# Patient Record
Sex: FEMALE | Race: WHITE | Employment: OTHER | URBAN - NONMETROPOLITAN AREA
[De-identification: names, ages, dates, MRNs, and addresses within clinical notes are randomized per-mention and may not be internally consistent; named-entity substitution may affect disease eponyms.]

---

## 2017-02-17 ENCOUNTER — TRANSFERRED RECORDS (OUTPATIENT)
Dept: HEALTH INFORMATION MANAGEMENT | Facility: HOSPITAL | Age: 82
End: 2017-02-17

## 2017-02-28 ENCOUNTER — OFFICE VISIT (OUTPATIENT)
Dept: FAMILY MEDICINE | Facility: OTHER | Age: 82
End: 2017-02-28
Attending: FAMILY MEDICINE
Payer: COMMERCIAL

## 2017-02-28 ENCOUNTER — OFFICE VISIT (OUTPATIENT)
Dept: SLEEP MEDICINE | Facility: HOSPITAL | Age: 82
End: 2017-02-28
Attending: INTERNAL MEDICINE
Payer: COMMERCIAL

## 2017-02-28 VITALS
SYSTOLIC BLOOD PRESSURE: 124 MMHG | BODY MASS INDEX: 23.43 KG/M2 | DIASTOLIC BLOOD PRESSURE: 60 MMHG | RESPIRATION RATE: 20 BRPM | WEIGHT: 124 LBS | HEART RATE: 68 BPM | OXYGEN SATURATION: 96 %

## 2017-02-28 VITALS — OXYGEN SATURATION: 98 % | SYSTOLIC BLOOD PRESSURE: 132 MMHG | HEART RATE: 60 BPM | DIASTOLIC BLOOD PRESSURE: 64 MMHG

## 2017-02-28 DIAGNOSIS — I25.10 CORONARY ARTERIOSCLEROSIS IN NATIVE ARTERY: ICD-10-CM

## 2017-02-28 DIAGNOSIS — I49.5 SICK SINUS SYNDROME (H): ICD-10-CM

## 2017-02-28 DIAGNOSIS — R06.02 SHORTNESS OF BREATH: Primary | ICD-10-CM

## 2017-02-28 DIAGNOSIS — J84.10 PULMONARY FIBROSIS (H): ICD-10-CM

## 2017-02-28 DIAGNOSIS — I10 BENIGN ESSENTIAL HYPERTENSION: Primary | ICD-10-CM

## 2017-02-28 PROCEDURE — 99212 OFFICE O/P EST SF 10 MIN: CPT

## 2017-02-28 PROCEDURE — 99213 OFFICE O/P EST LOW 20 MIN: CPT | Performed by: FAMILY MEDICINE

## 2017-02-28 PROCEDURE — 99212 OFFICE O/P EST SF 10 MIN: CPT | Mod: 27

## 2017-02-28 PROCEDURE — 99211 OFF/OP EST MAY X REQ PHY/QHP: CPT | Performed by: INTERNAL MEDICINE

## 2017-02-28 NOTE — PROGRESS NOTES
Cut to 5 mg prednisone, not doing much for her. Has spells of 'weakness', seems to emanate from her upper abdomen, ?. We discussed her diastolic dysfunction and mild obstructive lung disease and how limited our treatment is. Will try to incr prednisone to 10 mg, we discussed potential complications of this drug, she'll call 2 weeks, back in 2 mo.  /64 (BP Location: Right arm, Cuff Size: Adult Regular)  Pulse 60  SpO2 98%  Resp clear  Cor rrr

## 2017-02-28 NOTE — MR AVS SNAPSHOT
"              After Visit Summary   2/28/2017    Lauren Barron    MRN: [id number]           Patient Information     Date Of Birth          1/17/1926        Visit Information        Provider Department      2/28/2017 8:30 AM Humphrey Worley MD [facility]        Today's Diagnoses     Shortness of breath    -  1       Follow-ups after your visit        Your next 10 appointments already scheduled     Feb 28, 2017  9:30 AM CST   (Arrive by 9:15 AM)   SHORT with Vandana Jones MD   [facility])    [address] [place] [address]   [phone]            May 31, 2017 10:30 AM CDT   Return Visit with Humphrey Worley MD   [facility] ([facility] )    [address] [address]   [phone]            Jun 06, 2017  9:45 AM CDT   (Arrive by 9:30 AM)   SHORT with Vandana Jones MD   [facility])    [address] [place] [address]   [phone]              Who to contact     If you have questions or need follow up information about today's clinic visit or your schedule please contact [facility] directly at [phone].  Normal or non-critical lab and imaging results will be communicated to you by MyChart, letter or phone within 4 business days after the clinic has received the results. If you do not hear from us within 7 days, please contact the clinic through MyChart or phone. If you have a critical or abnormal lab result, we will notify you by phone as soon as possible.  Submit refill requests through Nextinit or call your pharmacy and they will forward the refill request to us. Please allow 3 business days for your refill to be completed.          Additional Information About Your Visit        MyChart Information     Nextinit lets you send messages to your doctor, view your test results, renew your prescriptions, schedule appointments and more. To sign up, go to [email or web address] . Click on \"Log in\" on " "the left side of the screen, which will take you to the Welcome page. Then click on \"Sign up Now\" on the right side of the page.     You will be asked to enter the access code listed below, as well as some personal information. Please follow the directions to create your username and password.     Your access code is: [email or web address]  Expires: 2017  8:47 AM     Your access code will  in 90 days. If you need help or a new code, please call your [facility] or [phone].        Care EveryWhere ID     This is your Care EveryWhere ID. This could be used by other organizations to access your [place] medical records  [id number]        Your Vitals Were     Pulse Pulse Oximetry                60 98%           Blood Pressure from Last 3 Encounters:   17 132/64   16 122/60   16 110/52    Weight from Last 3 Encounters:   16 125 lb (56.7 kg)   16 125 lb (56.7 kg)   10/06/16 127 lb (57.6 kg)              Today, you had the following     No orders found for display         Today's Medication Changes          These changes are accurate as of: 17  8:47 AM.  If you have any questions, ask your nurse or doctor.               These medicines have changed or have updated prescriptions.        Dose/Directions    predniSONE 10 MG tablet   Commonly known as:  DELTASONE   This may have changed:  how much to take   Used for:  Other emphysema (H)        Dose:  10 mg   Take 1 tablet (10 mg) by mouth daily (with breakfast)   Quantity:  60 tablet   Refills:  1                Primary Care Provider Office Phone # Fax #    Vandana Jones [phone]       [facility] [place] [address]  [facility] [address]        Thank you!     Thank you for choosing [facility]  for your care. Our goal is always to provide you with excellent care. Hearing back from our patients is one way we can continue to improve our services. Please take a few minutes to complete the written survey " that you may receive in the mail after your visit with us. Thank you!             Your Updated Medication List - Protect others around you: Learn how to safely use, store and throw away your medicines at www.disposemymeds.org.          This list is accurate as of: 2/28/17  8:47 AM.  Always use your most recent med list.                   Brand Name Dispense Instructions for use    acetaminophen 500 MG tablet    TYLENOL    100 tablet    Take 2 tablet (1000mg) by oral route every 6 hours as needed       amLODIPine 5 MG tablet    NORVASC    90 tablet    TAKE (1) TABLET DAILY.       benazepril 40 MG tablet    LOTENSIN    180 tablet    TAKE  (1)  TABLET TWICE A DAY.       calcium 1500 MG Tabs     100 tablet    Take 1 tablet by mouth daily       isosorbide mononitrate 30 MG 24 hr tablet    IMDUR    90 tablet    TAKE ONE TABLET AT BEDTIME.       Multiple vitamin Tabs     900 tablet    Take 1 tablet by oral route every day with foodTake 1 tablet by oral route every day with food       nitroglycerin 0.4 MG sublingual tablet    NITROSTAT    25 tablet    Place 1 tablet (0.4mg) by sublingual route at the first sign of attack; may repeat ever 5 min until relief; if pain persists after 3 tablets in 15 minutes prompt medical attention is recommended. AS NEEDED       pantoprazole 40 MG EC tablet    PROTONIX    60 tablet    Take 1 tablet (40 mg) by mouth daily       predniSONE 10 MG tablet    DELTASONE    60 tablet    Take 1 tablet (10 mg) by mouth daily (with breakfast)       STATIN NOT PRESCRIBED (INTENTIONAL)     0 each    Statin not prescribed intentionally due to Other patient declines (This option does not exclude patient from measure)       triamterene-hydrochlorothiazide 37.5-25 MG per capsule    DYAZIDE    90 capsule    TAKE 1 CAPSULE DAILY.       vitamin D 1000 UNITS capsule     90 capsule    Take 1 capsule by mouth daily

## 2017-02-28 NOTE — MR AVS SNAPSHOT
"              After Visit Summary   2/28/2017    Laurne Barron    MRN: [id number]           Patient Information     Date Of Birth          1/17/1926        Visit Information        Provider Department      2/28/2017 9:30 AM Vandana Jones MD [facility]        Today's Diagnoses     Benign essential hypertension    -  1    Sick sinus syndrome (H)        Pulmonary fibrosis (H)           Follow-ups after your visit        Your next 10 appointments already scheduled     May 31, 2017 10:30 AM CDT   Return Visit with Humphrey Worley MD   [facility] ([facility] )    [address] [address]   [phone]            Jun 06, 2017  9:45 AM CDT   (Arrive by 9:30 AM)   SHORT with Vandana Jones MD   [facility] ([facility])    [address] [place] [address]   [phone]              Who to contact     If you have questions or need follow up information about today's clinic visit or your schedule please contact [facility] directly at [phone].  Normal or non-critical lab and imaging results will be communicated to you by MyChart, letter or phone within 4 business days after the clinic has received the results. If you do not hear from us within 7 days, please contact the clinic through [employer] or phone. If you have a critical or abnormal lab result, we will notify you by phone as soon as possible.  Submit refill requests through Rev[employer]uate or call your pharmacy and they will forward the refill request to us. Please allow 3 business days for your refill to be completed.          Additional Information About Your Visit        [employer] Information     Rev[employer]uate lets you send messages to your doctor, view your test results, renew your prescriptions, schedule appointments and more. To sign up, go to [email or web address] . Click on \"Log in\" on the left side of the screen, which will take you to the Welcome page. Then click on \"Sign up Now\" on " the right side of the page.     You will be asked to enter the access code listed below, as well as some personal information. Please follow the directions to create your username and password.     Your access code is: [email or web address]  Expires: 2017  8:47 AM     Your access code will  in 90 days. If you need help or a new code, please call your [facility] or [phone].        Care EveryWhere ID     This is your Care EveryWhere ID. This could be used by other organizations to access your [place] medical records  [id number]        Your Vitals Were     Pulse Respirations Pulse Oximetry BMI (Body Mass Index)          68 20 96% 23.43 kg/m2         Blood Pressure from Last 3 Encounters:   17 124/60   17 132/64   16 122/60    Weight from Last 3 Encounters:   17 124 lb (56.2 kg)   16 125 lb (56.7 kg)   16 125 lb (56.7 kg)              Today, you had the following     No orders found for display         Today's Medication Changes          These changes are accurate as of: 17 10:00 AM.  If you have any questions, ask your nurse or doctor.               These medicines have changed or have updated prescriptions.        Dose/Directions    predniSONE 10 MG tablet   Commonly known as:  DELTASONE   This may have changed:  how much to take   Used for:  Other emphysema (H)        Dose:  10 mg   Take 1 tablet (10 mg) by mouth daily (with breakfast)   Quantity:  60 tablet   Refills:  1                Primary Care Provider Office Phone # Fax #    Vandana Jones [phone]       [facility] [place] [address]  [facility] [address]        Thank you!     Thank you for choosing [facility]  for your care. Our goal is always to provide you with excellent care. Hearing back from our patients is one way we can continue to improve our services. Please take a few minutes to complete the written survey that you may receive in the mail after your visit  with us. Thank you!             Your Updated Medication List - Protect others around you: Learn how to safely use, store and throw away your medicines at www.disposemymeds.org.          This list is accurate as of: 2/28/17 10:00 AM.  Always use your most recent med list.                   Brand Name Dispense Instructions for use    acetaminophen 500 MG tablet    TYLENOL    100 tablet    Take 2 tablet (1000mg) by oral route every 6 hours as needed       amLODIPine 5 MG tablet    NORVASC    90 tablet    TAKE (1) TABLET DAILY.       benazepril 40 MG tablet    LOTENSIN    180 tablet    TAKE  (1)  TABLET TWICE A DAY.       calcium 1500 MG Tabs     100 tablet    Take 1 tablet by mouth daily       isosorbide mononitrate 30 MG 24 hr tablet    IMDUR    90 tablet    TAKE ONE TABLET AT BEDTIME.       Multiple vitamin Tabs     900 tablet    Take 1 tablet by oral route every day with foodTake 1 tablet by oral route every day with food       nitroglycerin 0.4 MG sublingual tablet    NITROSTAT    25 tablet    Place 1 tablet (0.4mg) by sublingual route at the first sign of attack; may repeat ever 5 min until relief; if pain persists after 3 tablets in 15 minutes prompt medical attention is recommended. AS NEEDED       pantoprazole 40 MG EC tablet    PROTONIX    60 tablet    Take 1 tablet (40 mg) by mouth daily       predniSONE 10 MG tablet    DELTASONE    60 tablet    Take 1 tablet (10 mg) by mouth daily (with breakfast)       STATIN NOT PRESCRIBED (INTENTIONAL)     0 each    Statin not prescribed intentionally due to Other patient declines (This option does not exclude patient from measure)       triamterene-hydrochlorothiazide 37.5-25 MG per capsule    DYAZIDE    90 capsule    TAKE 1 CAPSULE DAILY.       vitamin D 1000 UNITS capsule     90 capsule    Take 1 capsule by mouth daily

## 2017-02-28 NOTE — NURSING NOTE
Patient ID was checked. Medications and vitals were reviewed. Vitals and a brief history was taken.

## 2017-03-01 NOTE — PROGRESS NOTES
[facility]    Lauren Barron, 89 year old, female presents with   Chief Complaint   Patient presents with     Follow Up For     coronary artery disease and hypertension. She is accompanied by her daughter Raeann today. She had seen Dr Worley earlier today who is increasing her prednisone to 10 mg daily for 2 weeks. She becomes quite short of breath with pushing her walking and will get a feeling of weakness starting below the diaphragm and radiating to her legs which has been chronic. As she slows down and rests this improves. This was discussed with Dr Worley as well today. She has aortic sclerosis without aortic stenosis and a heavily calcified splenic artery not requiring surgery per Dr Connor. She hasn't wanted to investigate her aortic valve further as we have discussed in the past that surgery would be a big process and she doesn't want to proceed. 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     Aortic insufficiency 6/2/2015     moderate, aortic sclerosis without stenosis echo 5/6/2015      ASCVD (arteriosclerotic cardiovascular disease) 08/25/2009     Bare metal stent obtuse margnial     Atherosclerosis of aorta (H) 03/12/2012     ECHO 2/2012     Cystocele 03/22/2012     Diastolic dysfunction 03/12/2012     GI bleed 07/25/2011     EGD-H Pylori, treated, Colonoscopy small Polyp - transfused 3 units of blood.     Hyperlipidemia 12/02/2003     Hypertension 12/06/1999     Mammogram declined 12/20/2012     Meniere's disease, unspecified 07/25/2011     Mitral regurgitation 6/2/2015     moderate, echo 5/6/2015      Osteoarthritis 11/06/2000     Sick sinus syndrome (H) 03/28/2016     dual chamber  pacemaker placement     Squamous cell skin cancer, chiquita coelloi 2006     MOHS Surgery     Transient global amnesia 2004       PAST SURGICAL HISTORY:  Past Surgical History   Procedure Laterality Date     Left subclavian line, triple lumen       Gastric Ulcer, Dieulfoy lesion, hemostatic clips placed - Massive GI Bleed - Transferred to [facility] removal of ovarian cyst(s)       C total knee arthroplasty  2007     LEFT - Osteoarthritis - [facility] Ru[facility] total knee arthroplasty  2009     RIGHT - Osteoarthritis - [facility] Ruvalc[facility]     Arthroscopy knee       RIGHT - Osteoarthritis     Hysterectomy, hui       Metorrhagia     Colonoscopy with polypectomy  2012     GI BLEED - DR. DEL CASTILLO            Cataract extraction and lens implantation       BILATERAL     Release carpal tunnel       RIGHT - Carpal Tunnel Syndrome     Repair bladder       Appendectomy       Colonoscopy       Implant pacemaker  2016       SOCIAL HISTORY  Social History     Social History     Marital status:      Spouse name: N/A     Number of children: N/A     Years of education: N/A     Occupational History     Not on file.     Social History Main Topics     Smoking status: Never Smoker     Smokeless tobacco: Never Used     Alcohol use No     Drug use: No     Sexual activity: No      Comment:      Other Topics Concern      Service No     Blood Transfusions Yes     Permits if needed     Caffeine Concern Yes     Coffee - 2 cups     Seat Belt Yes     Self-Exams Yes     Parent/Sibling W/ Cabg, Mi Or Angioplasty Before 65f 55m? No     Social History Narrative       MEDICATIONS:  Prior to Admission medications    Medication Sig Start Date End Date Taking? Authorizing Provider   amLODIPine (NORVASC) 5 MG tablet TAKE (1) TABLET DAILY. 16  Yes Vandana Jones MD   benazepril (LOTENSIN) 40 MG tablet TAKE  (1)  TABLET TWICE A DAY. 16  Yes Vandana Jones MD   isosorbide mononitrate  (IMDUR) 30 MG 24 hr tablet TAKE ONE TABLET AT BEDTIME. 12/28/16  Yes Vandana Jones MD   pantoprazole (PROTONIX) 40 MG EC tablet Take 1 tablet (40 mg) by mouth daily 12/28/16  Yes Vandana Jones MD   triamterene-hydrochlorothiazide (DYAZIDE) 37.5-25 MG per capsule TAKE 1 CAPSULE DAILY. 11/8/16  Yes Vandana Jones MD   predniSONE (DELTASONE) 10 MG tablet Take 1 tablet (10 mg) by mouth daily (with breakfast)  Patient taking differently: Take 5 mg by mouth daily (with breakfast)  11/8/16  Yes Humphrey Worley MD   STATIN NOT PRESCRIBED, INTENTIONAL, Statin not prescribed intentionally due to Other patient declines (This option does not exclude patient from measure) 9/8/16  Yes Vandana Jones MD   nitroglycerin (NITROSTAT) 0.4 MG SL tablet Place 1 tablet (0.4mg) by sublingual route at the first sign of attack; may repeat ever 5 min until relief; if pain persists after 3 tablets in 15 minutes prompt medical attention is recommended. AS NEEDED 12/2/14  Yes Vandana Jones MD   calcium 1500 MG TABS Take 1 tablet by mouth daily 12/24/13  Yes Vandana Jones MD   acetaminophen (TYLENOL) 500 MG tablet Take 2 tablet (1000mg) by oral route every 6 hours as needed 12/23/13  Yes Vandana Jones MD   Cholecalciferol (VITAMIN D) 1000 UNITS capsule Take 1 capsule by mouth daily 12/23/13  Yes Vandana Jones MD   Multiple vitamin TABS Take 1 tablet by oral route every day with foodTake 1 tablet by oral route every day with food 12/23/13  Yes Vandana Jones MD       ALLERGIES:     Allergies   Allergen Reactions     Foard Juice Other (See Comments), Nausea and GI Disturbance     Vertigo     Naprosyn [Naproxen]      Incontinence         ROS:  C: NEGATIVE for fever, chills, change in weight  R: NEGATIVE for significant cough or SOB  CV: NEGATIVE for chest pain, palpitations or peripheral edema  GI: NEGATIVE for nausea, abdominal pain, heartburn, or change in bowel habits  M: NEGATIVE for significant arthralgias or myalgia  N:  NEGATIVE for weakness, dizziness or paresthesias  H: NEGATIVE for bleeding problems  P: NEGATIVE for changes in mood or affect    EXAM:  /60  Pulse 68  Resp 20  Wt 124 lb (56.2 kg)  SpO2 96%  BMI 23.43 kg/m2 Body mass index is 23.43 kg/(m^2).   GENERAL APPEARANCE: healthy, alert and no distress  NECK: no adenopathy, no asymmetry, masses, or scars and thyroid normal to palpation  RESP: lungs clear to auscultation - no rales, rhonchi or wheezes  CV: regular rates and rhythm, normal S1 S2, no S3 or S4 and no murmur, click or rub  ABD: soft nontender, nondistended, without HSM or bruits noted  MS: extremities normal- no gross deformities noted  NEURO: Normal strength and tone, mentation intact and speech normal  PSYCH: mentation appears normal and affect normal/bright    LABS AND IMAGING:     Results for orders placed or performed in visit on 12/05/16   CBC with platelets and differential   Result Value Ref Range    WBC 7.8 4.0 - 11.0 10e9/L    RBC Count 3.89 3.8 - 5.2 10e12/L    Hemoglobin 11.5 (L) 11.7 - 15.7 g/dL    Hematocrit 33.2 (L) 35.0 - 47.0 %    MCV 85 78 - 100 fl    MCH 29.6 26.5 - 33.0 pg    MCHC 34.6 31.5 - 36.5 g/dL    RDW 13.4 10.0 - 15.0 %    Platelet Count 253 150 - 450 10e9/L    Diff Method Automated Method     % Neutrophils 63.3 %    % Lymphocytes 22.2 %    % Monocytes 12.5 %    % Eosinophils 1.3 %    % Basophils 0.3 %    % Immature Granulocytes 0.4 %    Nucleated RBCs 0 0 /100    Absolute Neutrophil 4.9 1.6 - 8.3 10e9/L    Absolute Lymphocytes 1.7 0.8 - 5.3 10e9/L    Absolute Monocytes 1.0 0.0 - 1.3 10e9/L    Absolute Eosinophils 0.1 0.0 - 0.7 10e9/L    Absolute Basophils 0.0 0.0 - 0.2 10e9/L    Abs Immature Granulocytes 0.0 0 - 0.4 10e9/L    Absolute Nucleated RBC 0.0    Lipid Profile   Result Value Ref Range    Cholesterol 227 (H) <200 mg/dL    Triglycerides 271 (H) <150 mg/dL    HDL Cholesterol 54 >49 mg/dL    LDL Cholesterol Calculated 119 (H) <100 mg/dL    Non HDL Cholesterol 173 (H) <130  mg/dL   Comprehensive metabolic panel   Result Value Ref Range    Sodium 131 (L) 133 - 144 mmol/L    Potassium 4.6 3.4 - 5.3 mmol/L    Chloride 98 94 - 109 mmol/L    Carbon Dioxide 24 20 - 32 mmol/L    Anion Gap 9 3 - 14 mmol/L    Glucose 99 70 - 99 mg/dL    Urea Nitrogen 48 (H) 7 - 30 mg/dL    Creatinine 1.44 (H) 0.52 - 1.04 mg/dL    GFR Estimate 34 (L) >60 mL/min/1.7m2    GFR Estimate If Black 41 (L) >60 mL/min/1.7m2    Calcium 9.1 8.5 - 10.1 mg/dL    Bilirubin Total 0.4 0.2 - 1.3 mg/dL    Albumin 3.5 3.4 - 5.0 g/dL    Protein Total 7.3 6.8 - 8.8 g/dL    Alkaline Phosphatase 52 40 - 150 U/L    ALT 27 0 - 50 U/L    AST 16 0 - 45 U/L         ASSESSMENT/PLAN:  (I10) Benign essential hypertension  (primary encounter diagnosis)  Comment: controlled  Plan: continue current medications. She has life line at home as well    (I49.5) Sick sinus syndrome (H)  Comment:   Plan: s/p pace maker placement, working well for her    (J84.10) Pulmonary fibrosis (H)  Comment:   Plan: on increased doses of prednisone per Dr Worley at this time    (I25.10) Coronary arteriosclerosis in native artery  Comment:   Plan: asymptomatic, doing well.       Vandana Jones MD  March 1, 2017

## 2017-03-16 ENCOUNTER — DOCUMENTATION ONLY (OUTPATIENT)
Dept: SLEEP MEDICINE | Facility: HOSPITAL | Age: 82
End: 2017-03-16

## 2017-03-16 NOTE — PROGRESS NOTES
Prednisone at 10 mg seems to be helping her breathing, she thinks it may be causing 'hot flashes' as well. She will continue at between 5 and 10 mg and see me in a month.

## 2017-04-05 DIAGNOSIS — I25.9 CHRONIC ISCHEMIC HEART DISEASE, UNSPECIFIED: ICD-10-CM

## 2017-04-05 DIAGNOSIS — J43.9 EMPHYSEMA, UNSPECIFIED (H): Primary | ICD-10-CM

## 2017-04-05 DIAGNOSIS — J43.8 OTHER EMPHYSEMA (H): ICD-10-CM

## 2017-04-06 ENCOUNTER — TELEPHONE (OUTPATIENT)
Dept: FAMILY MEDICINE | Facility: OTHER | Age: 82
End: 2017-04-06

## 2017-04-06 DIAGNOSIS — F41.9 ANXIETY: Primary | ICD-10-CM

## 2017-04-06 RX ORDER — PREDNISONE 10 MG/1
TABLET ORAL
Qty: 60 TABLET | Refills: 0 | Status: SHIPPED | OUTPATIENT
Start: 2017-04-06 | End: 2017-06-06

## 2017-04-06 RX ORDER — LORAZEPAM 0.5 MG/1
0.5 TABLET ORAL EVERY 8 HOURS PRN
Qty: 20 TABLET | Refills: 0 | Status: SHIPPED | OUTPATIENT
Start: 2017-04-06 | End: 2017-05-16

## 2017-04-06 RX ORDER — ISOSORBIDE MONONITRATE 30 MG/1
TABLET, EXTENDED RELEASE ORAL
Qty: 90 TABLET | Refills: 0 | Status: SHIPPED | OUTPATIENT
Start: 2017-04-06 | End: 2017-07-06

## 2017-04-06 NOTE — TELEPHONE ENCOUNTER
Prednisone last filled on 11.8.16,# 60 with 1 refill.Last office visit on 2.28.17.Medication pended.Thank you.

## 2017-04-06 NOTE — TELEPHONE ENCOUNTER
Notified daughter of this new medication. Will need to be signed and then we will fax to [facility].

## 2017-04-06 NOTE — TELEPHONE ENCOUNTER
This is fine, will use Lorazepam 0.5 every 8 hours as needed, sent to [facility]

## 2017-04-06 NOTE — TELEPHONE ENCOUNTER
11:12 AM    Reason for Call: Phone Call    Description: Patient's daughter, Yesica, would like to know if patient could have a low dose of Lorazepam or something for anxiety? Patient is quite anxious in the morning. If you could call Yesica back at [phone]    Was an appointment offered for this call? No    Preferred method for responding to this message: Telephone Call    If we cannot reach you directly, may we leave a detailed response at the number you provided? Yes    Can this message wait until your PCP/provider returns, if available today? YES    Daily Carrion

## 2017-05-16 DIAGNOSIS — F41.9 ANXIETY: ICD-10-CM

## 2017-05-16 RX ORDER — LORAZEPAM 0.5 MG/1
0.5 TABLET ORAL EVERY 8 HOURS PRN
Qty: 20 TABLET | Refills: 0 | Status: SHIPPED | OUTPATIENT
Start: 2017-05-16 | End: 2017-06-26

## 2017-05-16 NOTE — TELEPHONE ENCOUNTER
lorazepam      Last Written Prescription Date: 4/6/17  Last Fill Quantity: 20,  # refills: 0   Last Office Visit with G, [facility] or [facility] prescribing provider: 2/28/17                                         Next 5 appointments (look out 90 days)     May 31, 2017 10:30 AM CDT   Return Visit with Humphrey Worley MD   [facility] ([facility] )    [address] [place] [address]   [phone]            Jun 06, 2017  9:45 AM CDT   (Arrive by 9:30 AM)   SHORT with Vandana Jones MD   [facility] ([facility])    [address] [place] [address]   [phone]

## 2017-05-18 DIAGNOSIS — I10 BENIGN ESSENTIAL HYPERTENSION: ICD-10-CM

## 2017-05-19 RX ORDER — TRIAMTERENE AND HYDROCHLOROTHIAZIDE 37.5; 25 MG/1; MG/1
CAPSULE ORAL
Qty: 90 CAPSULE | Refills: 1 | Status: SHIPPED | OUTPATIENT
Start: 2017-05-19 | End: 2017-08-28

## 2017-05-19 NOTE — TELEPHONE ENCOUNTER
Dyazide      Last Written Prescription Date: 02/14/17  Last Fill Quantity: 90, # refills: 0  Last Office Visit with G, [facility] or [facility] prescribing provider: 02/28/17  Next 5 appointments (look out 90 days)     May 31, 2017 10:30 AM CDT   Return Visit with Humphrey Worley MD   [facility] ([facility] )    [address] [place] [address]   [phone]            Jun 06, 2017  9:45 AM CDT   (Arrive by 9:30 AM)   SHORT with Vandana Jones MD   [facility] ([facility])    [address] [place] [address]   [phone]                   Potassium   Date Value Ref Range Status   12/05/2016 4.6 3.4 - 5.3 mmol/L Final     Creatinine   Date Value Ref Range Status   12/05/2016 1.44 (H) 0.52 - 1.04 mg/dL Final     BP Readings from Last 3 Encounters:   02/28/17 124/60   02/28/17 132/64   12/05/16 122/60

## 2017-05-31 ENCOUNTER — OFFICE VISIT (OUTPATIENT)
Dept: SLEEP MEDICINE | Facility: HOSPITAL | Age: 82
End: 2017-05-31
Attending: INTERNAL MEDICINE
Payer: COMMERCIAL

## 2017-05-31 VITALS
SYSTOLIC BLOOD PRESSURE: 142 MMHG | HEART RATE: 65 BPM | RESPIRATION RATE: 16 BRPM | OXYGEN SATURATION: 96 % | DIASTOLIC BLOOD PRESSURE: 56 MMHG

## 2017-05-31 DIAGNOSIS — R06.02 SHORTNESS OF BREATH: Primary | ICD-10-CM

## 2017-05-31 PROCEDURE — 99212 OFFICE O/P EST SF 10 MIN: CPT | Performed by: INTERNAL MEDICINE

## 2017-05-31 NOTE — PROGRESS NOTES
Dyspnea is baseline, some day to day variability, doesn't feel prednisone works well so will taper off it. We talked about exercise and she will work on this.   /56  Pulse 65  Resp 16  SpO2 96%    A/ Dyspnea multifactorial, mild pulm/cardiac/dx and deconditioning.

## 2017-05-31 NOTE — MR AVS SNAPSHOT
"              After Visit Summary   5/31/2017    Lauren Barron    MRN: [id number]           Patient Information     Date Of Birth          1/17/1926        Visit Information        Provider Department      5/31/2017 10:30 AM Humphrey Worley MD [facility]        Today's Diagnoses     Shortness of breath    -  1       Follow-ups after your visit        Your next 10 appointments already scheduled     May 31, 2017 10:30 AM CDT   Return Visit with Humphrey Worley MD   [facility] ([facility] )    [address] [address]   [phone]            Jun 06, 2017  9:45 AM CDT   (Arrive by 9:30 AM)   SHORT with Vandana Jones MD   [facility] ([facility])    [address] [place] [address]   [phone]              Who to contact     If you have questions or need follow up information about today's clinic visit or your schedule please contact [facility] directly at [phone].  Normal or non-critical lab and imaging results will be communicated to you by [employer], letter or phone within 4 business days after the clinic has received the results. If you do not hear from us within 7 days, please contact the clinic through [facility] [employer] or phone. If you have a critical or abnormal lab result, we will notify you by phone as soon as possible.  Submit refill requests through [employer] or call your pharmacy and they will forward the refill request to us. Please allow 3 business days for your refill to be completed.          Additional Information About Your Visit        [employer] Information     [employer] lets you send messages to your doctor, view your test results, renew your prescriptions, schedule appointments and more. To sign up, go to [email or web address] . Click on \"Log in\" on the left side of the screen, which will take you to the Welcome page. Then click on \"Sign up Now\" on the right side of the page.     You will be asked to enter the access code listed below, as " well as some personal information. Please follow the directions to create your username and password.     Your access code is: [email or web address]  Expires: 2017 10:26 AM     Your access code will  in 90 days. If you need help or a new code, please call your [facility] clinic or [phone].        Care EveryWhere ID     This is your Care EveryWhere ID. This could be used by other organizations to access your [facility] medical records  [id number]        Your Vitals Were     Pulse Respirations Pulse Oximetry             65 16 96%          Blood Pressure from Last 3 Encounters:   17 142/56   17 124/60   17 132/64    Weight from Last 3 Encounters:   17 124 lb (56.2 kg)   16 125 lb (56.7 kg)   16 125 lb (56.7 kg)              Today, you had the following     No orders found for display       Primary Care Provider Office Phone # Fax #    Vandana Jones [phone]       [facility] [place] [address] [facility] [address]  [place] [address]        Thank you!     Thank you for choosing [facility]  for your care. Our goal is always to provide you with excellent care. Hearing back from our patients is one way we can continue to improve our services. Please take a few minutes to complete the written survey that you may receive in the mail after your visit with us. Thank you!             Your Updated Medication List - Protect others around you: Learn how to safely use, store and throw away your medicines at www.disposemymeds.org.          This list is accurate as of: 17 10:26 AM.  Always use your most recent med list.                   Brand Name Dispense Instructions for use    acetaminophen 500 MG tablet    TYLENOL    100 tablet    Take 2 tablet (1000mg) by oral route every 6 hours as needed       amLODIPine 5 MG tablet    NORVASC    90 tablet    TAKE (1) TABLET DAILY.       benazepril 40 MG tablet    LOTENSIN    180 tablet    TAKE  (1)  TABLET TWICE A DAY.       calcium  1500 MG Tabs     100 tablet    Take 1 tablet by mouth daily       isosorbide mononitrate 30 MG 24 hr tablet    IMDUR    90 tablet    TAKE ONE TABLET AT BEDTIME.       LORazepam 0.5 MG tablet    ATIVAN    20 tablet    Take 1 tablet (0.5 mg) by mouth every 8 hours as needed for anxiety       Multiple vitamin Tabs     900 tablet    Take 1 tablet by oral route every day with foodTake 1 tablet by oral route every day with food       nitroglycerin 0.4 MG sublingual tablet    NITROSTAT    25 tablet    Place 1 tablet (0.4mg) by sublingual route at the first sign of attack; may repeat ever 5 min until relief; if pain persists after 3 tablets in 15 minutes prompt medical attention is recommended. AS NEEDED       pantoprazole 40 MG EC tablet    PROTONIX    60 tablet    Take 1 tablet (40 mg) by mouth daily       predniSONE 10 MG tablet    DELTASONE    60 tablet    TAKE 1 TABLET DAILY WITH BREAKFAST.       STATIN NOT PRESCRIBED (INTENTIONAL)     0 each    Statin not prescribed intentionally due to Other patient declines (This option does not exclude patient from measure)       triamterene-hydrochlorothiazide 37.5-25 MG per capsule    DYAZIDE    90 capsule    TAKE 1 CAPSULE DAILY.       vitamin D 1000 UNITS capsule     90 capsule    Take 1 capsule by mouth daily

## 2017-06-06 ENCOUNTER — OFFICE VISIT (OUTPATIENT)
Dept: FAMILY MEDICINE | Facility: OTHER | Age: 82
End: 2017-06-06
Attending: FAMILY MEDICINE
Payer: COMMERCIAL

## 2017-06-06 VITALS
OXYGEN SATURATION: 98 % | SYSTOLIC BLOOD PRESSURE: 118 MMHG | WEIGHT: 128 LBS | BODY MASS INDEX: 24.19 KG/M2 | DIASTOLIC BLOOD PRESSURE: 52 MMHG | TEMPERATURE: 96.7 F | HEART RATE: 60 BPM | RESPIRATION RATE: 18 BRPM

## 2017-06-06 DIAGNOSIS — I25.9 CHRONIC ISCHEMIC HEART DISEASE, UNSPECIFIED: ICD-10-CM

## 2017-06-06 DIAGNOSIS — R10.13 ABDOMINAL PAIN, EPIGASTRIC: ICD-10-CM

## 2017-06-06 DIAGNOSIS — I10 BENIGN ESSENTIAL HYPERTENSION: Primary | ICD-10-CM

## 2017-06-06 LAB
ALBUMIN SERPL-MCNC: 3.6 G/DL (ref 3.4–5)
ALP SERPL-CCNC: 36 U/L (ref 40–150)
ALT SERPL W P-5'-P-CCNC: 32 U/L (ref 0–50)
ANION GAP SERPL CALCULATED.3IONS-SCNC: 8 MMOL/L (ref 3–14)
AST SERPL W P-5'-P-CCNC: 22 U/L (ref 0–45)
BILIRUB SERPL-MCNC: 0.5 MG/DL (ref 0.2–1.3)
BUN SERPL-MCNC: 55 MG/DL (ref 7–30)
CALCIUM SERPL-MCNC: 9.2 MG/DL (ref 8.5–10.1)
CHLORIDE SERPL-SCNC: 110 MMOL/L (ref 94–109)
CO2 SERPL-SCNC: 23 MMOL/L (ref 20–32)
CREAT SERPL-MCNC: 1.49 MG/DL (ref 0.52–1.04)
ERYTHROCYTE [DISTWIDTH] IN BLOOD BY AUTOMATED COUNT: 14.1 % (ref 10–15)
GFR SERPL CREATININE-BSD FRML MDRD: 33 ML/MIN/1.7M2
GLUCOSE SERPL-MCNC: 92 MG/DL (ref 70–99)
HCT VFR BLD AUTO: 34.2 % (ref 35–47)
HGB BLD-MCNC: 11.6 G/DL (ref 11.7–15.7)
MCH RBC QN AUTO: 30 PG (ref 26.5–33)
MCHC RBC AUTO-ENTMCNC: 33.9 G/DL (ref 31.5–36.5)
MCV RBC AUTO: 88 FL (ref 78–100)
PLATELET # BLD AUTO: 210 10E9/L (ref 150–450)
POTASSIUM SERPL-SCNC: 4.4 MMOL/L (ref 3.4–5.3)
PROT SERPL-MCNC: 7 G/DL (ref 6.8–8.8)
RBC # BLD AUTO: 3.87 10E12/L (ref 3.8–5.2)
SODIUM SERPL-SCNC: 141 MMOL/L (ref 133–144)
WBC # BLD AUTO: 7.7 10E9/L (ref 4–11)

## 2017-06-06 PROCEDURE — 80053 COMPREHEN METABOLIC PANEL: CPT | Mod: ZL | Performed by: FAMILY MEDICINE

## 2017-06-06 PROCEDURE — 99214 OFFICE O/P EST MOD 30 MIN: CPT | Performed by: FAMILY MEDICINE

## 2017-06-06 PROCEDURE — 99212 OFFICE O/P EST SF 10 MIN: CPT

## 2017-06-06 PROCEDURE — 85027 COMPLETE CBC AUTOMATED: CPT | Mod: ZL | Performed by: FAMILY MEDICINE

## 2017-06-06 PROCEDURE — 36415 COLL VENOUS BLD VENIPUNCTURE: CPT | Mod: ZL | Performed by: FAMILY MEDICINE

## 2017-06-06 RX ORDER — NITROGLYCERIN 0.4 MG/1
TABLET SUBLINGUAL
Qty: 25 TABLET | Refills: 3 | Status: SHIPPED | OUTPATIENT
Start: 2017-06-06 | End: 2019-05-21

## 2017-06-06 ASSESSMENT — PAIN SCALES - GENERAL: PAINLEVEL: NO PAIN (1)

## 2017-06-06 NOTE — NURSING NOTE
"Chief Complaint   Patient presents with     Follow Up For     pulm HTN, c/o constant substernal pain that seems to go down her legs making them weak, increased shortness of breath       Initial /52 (BP Location: Left arm, Patient Position: Chair)  Pulse 60  Temp 96.7  F (35.9  C)  Resp 18  Wt 128 lb (58.1 kg)  SpO2 98%  BMI 24.19 kg/m2 Estimated body mass index is 24.19 kg/(m^2) as calculated from the following:    Height as of 12/5/16: 5' 1\" (1.549 m).    Weight as of this encounter: 128 lb (58.1 kg).  Medication Reconciliation: complete   Sulma Armas      "

## 2017-06-06 NOTE — PROGRESS NOTES
[facility]    Lauren Barron, 89 year old, female presents with   Chief Complaint   Patient presents with     Follow Up For     pulm HTN, c/o intermittent epigastric, grabbing pain that seems to go down her legs making them weak, increased shortness of breath. She has underlying heart disease and was just evaluted by Dr Jain. Dr Worley just tapered her off of prednisone for COPD as this wasn't helpful. She gets quite short of breath having to rest several times going from the car to [facility]. She takes several rests during the day and is frustrated that she can't do all she would like to do. This pain comes and goes several times per day and is eased if she lies down. Bending over doesn't cause it. She was noted to have a large hiatal hernia on her last CT in 2014 and we have attributed this pain to that as all else has been ruled out. Dr Jain felt she could consider surgery however her daughter who is with her today is worried about this. No symptoms of claudication, no back pain. She is taking 1/2 tablet of Lorazepam intermittently to sleep and help with anxiety at bedtime which is working well for her. 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     Aortic insufficiency 6/2/2015    moderate, aortic sclerosis without stenosis echo 5/6/2015      ASCVD (arteriosclerotic cardiovascular disease) 08/25/2009    Bare metal stent obtuse margnial     Atherosclerosis of aorta (H) 03/12/2012    ECHO 2/2012     Cystocele 03/22/2012     Diastolic dysfunction 03/12/2012     GI bleed 07/25/2011    EGD-H Pylori, treated, Colonoscopy small Polyp - transfused 3 units of blood.     Hyperlipidemia 12/02/2003      Hypertension 1999     Mammogram declined 2012     Meniere's disease, unspecified 2011     Mitral regurgitation 2015    moderate, echo 2015      Osteoarthritis 2000     Sick sinus syndrome (H) 2016    dual chamber pacemaker placement     Squamous cell skin cancer, ala modei 2006    MOHS Surgery     Transient global amnesia 2004       PAST SURGICAL HISTORY:  Past Surgical History:   Procedure Laterality Date     APPENDECTOMY       ARTHROSCOPY KNEE      RIGHT - Osteoarthritis     C TOTAL KNEE ARTHROPLASTY  2007    LEFT - Osteoarthritis - [facility]     C TOTAL KNEE ARTHROPLASTY  2009    RIGHT - Osteoarthritis - [facility]     CATARACT EXTRACTION and LENS IMPLANTATION      BILATERAL     COLONOSCOPY       Colonoscopy with Polypectomy      GI BLEED - DR. DEL CASTILLO            HC REMOVAL OF OVARIAN CYST(S)       HYSTERECTOMY, YANG      Metorrhagia     IMPLANT PACEMAKER  2016     Left Subclavian Line, Triple Lumen      Gastric Ulcer, Dieulfoy lesion, hemostatic clips placed - Massive GI Bleed - Transferred to [facility]     RELEASE CARPAL TUNNEL      RIGHT - Carpal Tunnel Syndrome     REPAIR BLADDER         SOCIAL HISTORY  Social History     Social History     Marital status:      Spouse name: N/A     Number of children: N/A     Years of education: N/A     Occupational History     Not on file.     Social History Main Topics     Smoking status: Never Smoker     Smokeless tobacco: Never Used     Alcohol use No     Drug use: No     Sexual activity: No      Comment:      Other Topics Concern      Service No     Blood Transfusions Yes     Permits if needed     Caffeine Concern Yes     Coffee - 2 cups     Seat Belt Yes     Self-Exams Yes     Parent/Sibling W/ Cabg, Mi Or Angioplasty Before 65f 55m? No     Social History Narrative       MEDICATIONS:  Prior to Admission medications    Medication Sig Start Date End Date  Taking? Authorizing Provider   nitroglycerin (NITROSTAT) 0.4 MG sublingual tablet Place 1 tablet (0.4mg) by sublingual route at the first sign of attack; may repeat ever 5 min until relief; if pain persists after 3 tablets in 15 minutes prompt medical attention is recommended. AS NEEDED 6/6/17  Yes Vandana Jones MD   triamterene-hydrochlorothiazide (DYAZIDE) 37.5-25 MG per capsule TAKE 1 CAPSULE DAILY. 5/19/17  Yes Vandana Jones MD   LORazepam (ATIVAN) 0.5 MG tablet Take 1 tablet (0.5 mg) by mouth every 8 hours as needed for anxiety 5/16/17  Yes Vandana Jones MD   isosorbide mononitrate (IMDUR) 30 MG 24 hr tablet TAKE ONE TABLET AT BEDTIME. 4/6/17  Yes Vandana Jones MD   amLODIPine (NORVASC) 5 MG tablet TAKE (1) TABLET DAILY. 12/28/16  Yes Vandana Jones MD   benazepril (LOTENSIN) 40 MG tablet TAKE  (1)  TABLET TWICE A DAY. 12/28/16  Yes Vandana Jones MD   pantoprazole (PROTONIX) 40 MG EC tablet Take 1 tablet (40 mg) by mouth daily 12/28/16  Yes Vandana Jones MD   STATIN NOT PRESCRIBED, INTENTIONAL, Statin not prescribed intentionally due to Other patient declines (This option does not exclude patient from measure) 9/8/16  Yes Vandana Jones MD   calcium 1500 MG TABS Take 1 tablet by mouth daily 12/24/13  Yes Vandana Jones MD   acetaminophen (TYLENOL) 500 MG tablet Take 2 tablet (1000mg) by oral route every 6 hours as needed 12/23/13  Yes Vandana Jones MD   Cholecalciferol (VITAMIN D) 1000 UNITS capsule Take 1 capsule by mouth daily 12/23/13  Yes Vandana Jones MD   Multiple vitamin TABS Take 1 tablet by oral route every day with foodTake 1 tablet by oral route every day with food 12/23/13  Yes Vandana Jones MD       ALLERGIES:     Allergies   Allergen Reactions     Hab[place]ham Juice Other (See Comments), Nausea and GI Disturbance     Vertigo     Food      Oranges.     Naprosyn [Naproxen]      Incontinence       Niacin Swelling       ROS:  C: NEGATIVE for fever, chills, change in weight  I: NEGATIVE  for worrisome rashes, moles or lesions  R: NEGATIVE for significant cough or SOB  CV: NEGATIVE for chest pain, palpitations or peripheral edema  M: NEGATIVE for significant arthralgias or myalgia  P: NEGATIVE for changes in mood or affect    EXAM:  /52 (BP Location: Left arm, Patient Position: Chair)  Pulse 60  Temp 96.7  F (35.9  C)  Resp 18  Wt 128 lb (58.1 kg)  SpO2 98%  BMI 24.19 kg/m2 Body mass index is 24.19 kg/(m^2).   GENERAL APPEARANCE: healthy, alert and no distress  NECK: no adenopathy, no asymmetry, masses, or scars and thyroid normal to palpation  RESP: lungs clear to auscultation - no rales, rhonchi or wheezes  CV: regular rates and rhythm, normal S1 S2, no S3 or S4 and grade 1/6 /DIVYA murmur heard best over the upper LSB  LYMPHATICS: normal ant/post cervical and supraclavicular nodes  ABDOMEN: soft, nontender, without hepatosplenomegaly or masses, bowel sounds normal and the epigastric area is were she has her pain but I can't reproduce it for her  NEURO: Normal strength and tone, mentation intact and speech normal  PSYCH: mentation appears normal and affect normal/bright    LABS AND IMAGING:     Results for orders placed or performed in visit on 12/05/16   CBC with platelets and differential   Result Value Ref Range    WBC 7.8 4.0 - 11.0 10e9/L    RBC Count 3.89 3.8 - 5.2 10e12/L    Hemoglobin 11.5 (L) 11.7 - 15.7 g/dL    Hematocrit 33.2 (L) 35.0 - 47.0 %    MCV 85 78 - 100 fl    MCH 29.6 26.5 - 33.0 pg    MCHC 34.6 31.5 - 36.5 g/dL    RDW 13.4 10.0 - 15.0 %    Platelet Count 253 150 - 450 10e9/L    Diff Method Automated Method     % Neutrophils 63.3 %    % Lymphocytes 22.2 %    % Monocytes 12.5 %    % Eosinophils 1.3 %    % Basophils 0.3 %    % Immature Granulocytes 0.4 %    Nucleated RBCs 0 0 /100    Absolute Neutrophil 4.9 1.6 - 8.3 10e9/L    Absolute Lymphocytes 1.7 0.8 - 5.3 10e9/L    Absolute Monocytes 1.0 0.0 - 1.3 10e9/L    Absolute Eosinophils 0.1 0.0 - 0.7 10e9/L    Absolute  Basophils 0.0 0.0 - 0.2 10e9/L    Abs Immature Granulocytes 0.0 0 - 0.4 10e9/L    Absolute Nucleated RBC 0.0    Lipid Profile   Result Value Ref Range    Cholesterol 227 (H) <200 mg/dL    Triglycerides 271 (H) <150 mg/dL    HDL Cholesterol 54 >49 mg/dL    LDL Cholesterol Calculated 119 (H) <100 mg/dL    Non HDL Cholesterol 173 (H) <130 mg/dL   Comprehensive metabolic panel   Result Value Ref Range    Sodium 131 (L) 133 - 144 mmol/L    Potassium 4.6 3.4 - 5.3 mmol/L    Chloride 98 94 - 109 mmol/L    Carbon Dioxide 24 20 - 32 mmol/L    Anion Gap 9 3 - 14 mmol/L    Glucose 99 70 - 99 mg/dL    Urea Nitrogen 48 (H) 7 - 30 mg/dL    Creatinine 1.44 (H) 0.52 - 1.04 mg/dL    GFR Estimate 34 (L) >60 mL/min/1.7m2    GFR Estimate If Black 41 (L) >60 mL/min/1.7m2    Calcium 9.1 8.5 - 10.1 mg/dL    Bilirubin Total 0.4 0.2 - 1.3 mg/dL    Albumin 3.5 3.4 - 5.0 g/dL    Protein Total 7.3 6.8 - 8.8 g/dL    Alkaline Phosphatase 52 40 - 150 U/L    ALT 27 0 - 50 U/L    AST 16 0 - 45 U/L         ASSESSMENT/PLAN:  (I10) Benign essential hypertension  (primary encounter diagnosis)  Comment: controlled  Plan: CBC with platelets, Comprehensive metabolic         panel        Recheck labs today    (I25.9) Chronic ischemic heart disease, unspecified  Comment:   Plan: nitroglycerin (NITROSTAT) 0.4 MG sublingual         tablet        Renewed as hers is outdated    (R10.13) Abdominal pain, epigastric  Comment:   Plan: CT Abdomen w Contrast        Will recheck this to evaluate the hiatal hernia. She also had a splenic artery aneurysm which DR Connor felt didn't need treatment.   Recheck in one month, sooner for concerns    Vandana Jones MD  June 6, 2017

## 2017-06-06 NOTE — LETTER
[facility]  [address]  [place] [address]  [phone]           June 8, 2017    Lauren Barron                                                                                                                                                       [address]  [place] [address]              Dear Lauren,    The results of your recent Ct of Abdomen were normal.     I recommend the following: Benign cysts of kidneys, old compression fracture of L2, no change from 2014     Large hiatal hernia noted again which is causing pain. Small nodule of the left lung, recommendation is to repeat CT to see if this changes in 6 months        If you have any questions or concerns, please call myself or my nurse at [phone].    Results for orders placed or performed in visit on 06/06/17   CT Abdomen w Contrast    Narrative    CT SCAN OF ABDOMEN AND PELVIS WITH IV CONTRAST    REPORT:  The liver is free of masses.  No gallstones are seen.  The  spleen is normal in size.  There is a densely calcified small splenic  artery aneurysm noted, measuring 1.4 cm in diameter.  The adrenal  glands appear normal.  Right and left kidneys are free of masses or  hydronephrosis.  No pancreatic masses seen.  There are no adrenal  masses.  There is some small cysts seen in the kidneys.  No  hydronephrosis is noted.  Periaortic lymph nodes are normal in  caliber.  No abnormality is seen in the upper peritoneal cavity.  There is compression of the superior endplate of the L2 vertebra of  uncertain age.  There is spondylolisthesis of L3 on L4 due to facet  joint degenerative change.    IMPRESSION:  BENIGN CYST IN THE KIDNEYS.  MILD L2 COMPRESSION  FRACTURE, STABLE FROM MARCH OF 2014.  Exam Date: Jun 07, 2017 10:17:24 AM  Author: GALLITO HENRY  This report is final and signed     CT Chest w Contrast    Narrative    CT SCAN OF ABDOMEN AND PELVIS WITH IV CONTRAST    REPORT:  There is a large hiatal hernia.  There is some  linear  atelectasis or scarring seen in both lungs.   There is a small nodule  seen in the left lung measuring 7 mm in diameter.  Follow up CT scan  in six months time is recommended to further evaluate this nodule,  best seen on axial image #35.  There is no hilar or mediastinal masses  or lymphadenopathy. Axillary and supraclavicular lymph nodes appear  normal.  No pleural abnormalities are seen.  There is old healed rib  fractures noted on the left.  There are moderate degenerative changes  in the thoracic spine.    IMPRESSION:  1.  LARGE HIATAL HERNIA.    2.  SMALL NODULE IN THE LEFT LUNG.  FOLLOW UP CT SCAN IN SIX MONTHS  TIME IS RECOMMENDED.  Exam Date: Jun 07, 2017 10:17:21 AM  Author: GALLITO HENRY  This report is final and signed     CBC with platelets   Result Value Ref Range    WBC 7.7 4.0 - 11.0 10e9/L    RBC Count 3.87 3.8 - 5.2 10e12/L    Hemoglobin 11.6 (L) 11.7 - 15.7 g/dL    Hematocrit 34.2 (L) 35.0 - 47.0 %    MCV 88 78 - 100 fl    MCH 30.0 26.5 - 33.0 pg    MCHC 33.9 31.5 - 36.5 g/dL    RDW 14.1 10.0 - 15.0 %    Platelet Count 210 150 - 450 10e9/L   Comprehensive metabolic panel   Result Value Ref Range    Sodium 141 133 - 144 mmol/L    Potassium 4.4 3.4 - 5.3 mmol/L    Chloride 110 (H) 94 - 109 mmol/L    Carbon Dioxide 23 20 - 32 mmol/L    Anion Gap 8 3 - 14 mmol/L    Glucose 92 70 - 99 mg/dL    Urea Nitrogen 55 (H) 7 - 30 mg/dL    Creatinine 1.49 (H) 0.52 - 1.04 mg/dL    GFR Estimate 33 (L) >60 mL/min/1.7m2    GFR Estimate If Black 40 (L) >60 mL/min/1.7m2    Calcium 9.2 8.5 - 10.1 mg/dL    Bilirubin Total 0.5 0.2 - 1.3 mg/dL    Albumin 3.6 3.4 - 5.0 g/dL    Protein Total 7.0 6.8 - 8.8 g/dL    Alkaline Phosphatase 36 (L) 40 - 150 U/L    ALT 32 0 - 50 U/L    AST 22 0 - 45 U/L         Sincerely,        Vandana Jones MD

## 2017-06-06 NOTE — MR AVS SNAPSHOT
"              After Visit Summary   6/6/2017    Lauren Barron    MRN: [id number]           Patient Information     Date Of Birth          1/17/1926        Visit Information        Provider Department      6/6/2017 9:45 AM Vandana Jones MD [facility]        Today's Diagnoses     Benign essential hypertension    -  1    Chronic ischemic heart disease, unspecified        Abdominal pain, epigastric           Follow-ups after your visit        Follow-up notes from your care team     Return in about 4 weeks (around 7/4/2017).      Your next 10 appointments already scheduled     Jun 07, 2017 10:00 AM CDT   Radiology with HI CT SCAN   HI CT Scan ([facility] )    [address] [address]   [phone]            Jul 06, 2017 10:45 AM CDT   (Arrive by 10:30 AM)   SHORT with Vandana Jones MD   [facility] ([facility])    [address]  [facility] [address]   [phone]              Who to contact     If you have questions or need follow up information about today's clinic visit or your schedule please contact [facility] directly at [phone].  Normal or non-critical lab and imaging results will be communicated to you by MyChart, letter or phone within 4 business days after the clinic has received the results. If you do not hear from us within 7 days, please contact the clinic through MyChart or phone. If you have a critical or abnormal lab result, we will notify you by phone as soon as possible.  Submit refill requests through [employer] or call your pharmacy and they will forward the refill request to us. Please allow 3 business days for your refill to be completed.          Additional Information About Your Visit        MyChart Information     Waicai lets you send messages to your doctor, view your test results, renew your prescriptions, schedule appointments and more. To sign up, go to [email or web address] . Click on \"Log in\" on " "the left side of the screen, which will take you to the Welcome page. Then click on \"Sign up Now\" on the right side of the page.     You will be asked to enter the access code listed below, as well as some personal information. Please follow the directions to create your username and password.     Your access code is: [email or web address]  Expires: 2017 10:26 AM     Your access code will  in 90 days. If you need help or a new code, please call your [facility] or [phone].        Care EveryWhere ID     This is your Care EveryWhere ID. This could be used by other organizations to access your [facility] medical records  [id number]        Your Vitals Were     Pulse Temperature Respirations Pulse Oximetry BMI (Body Mass Index)       60 96.7  F (35.9  C) 18 98% 24.19 kg/m2        Blood Pressure from Last 3 Encounters:   17 118/52   17 142/56   17 124/60    Weight from Last 3 Encounters:   17 128 lb (58.1 kg)   17 124 lb (56.2 kg)   16 125 lb (56.7 kg)              We Performed the Following     CBC with platelets     Comprehensive metabolic panel          Today's Medication Changes          These changes are accurate as of: 17 10:07 AM.  If you have any questions, ask your nurse or doctor.               Stop taking these medicines if you haven't already. Please contact your care team if you have questions.     predniSONE 10 MG tablet   Commonly known as:  DELTASONE   Stopped by:  Vandana Jones MD                Where to get your medicines      Some of these will need a paper prescription and others can be bought over the counter.  Ask your nurse if you have questions.     Bring a paper prescription for each of these medications     nitroglycerin 0.4 MG sublingual tablet                Primary Care Provider Office Phone # Fax #    Vandana Jones [phone]       [facility] [place] [address]  [place] [address]        Thank you!     Thank you " for choosing [facility] [place]  for your care. Our goal is always to provide you with excellent care. Hearing back from our patients is one way we can continue to improve our services. Please take a few minutes to complete the written survey that you may receive in the mail after your visit with us. Thank you!             Your Updated Medication List - Protect others around you: Learn how to safely use, store and throw away your medicines at www.disposemymeds.org.          This list is accurate as of: 6/6/17 10:07 AM.  Always use your most recent med list.                   Brand Name Dispense Instructions for use    acetaminophen 500 MG tablet    TYLENOL    100 tablet    Take 2 tablet (1000mg) by oral route every 6 hours as needed       amLODIPine 5 MG tablet    NORVASC    90 tablet    TAKE (1) TABLET DAILY.       benazepril 40 MG tablet    LOTENSIN    180 tablet    TAKE  (1)  TABLET TWICE A DAY.       calcium 1500 MG Tabs     100 tablet    Take 1 tablet by mouth daily       isosorbide mononitrate 30 MG 24 hr tablet    IMDUR    90 tablet    TAKE ONE TABLET AT BEDTIME.       LORazepam 0.5 MG tablet    ATIVAN    20 tablet    Take 1 tablet (0.5 mg) by mouth every 8 hours as needed for anxiety       Multiple vitamin Tabs     900 tablet    Take 1 tablet by oral route every day with foodTake 1 tablet by oral route every day with food       nitroglycerin 0.4 MG sublingual tablet    NITROSTAT    25 tablet    Place 1 tablet (0.4mg) by sublingual route at the first sign of attack; may repeat ever 5 min until relief; if pain persists after 3 tablets in 15 minutes prompt medical attention is recommended. AS NEEDED       pantoprazole 40 MG EC tablet    PROTONIX    60 tablet    Take 1 tablet (40 mg) by mouth daily       STATIN NOT PRESCRIBED (INTENTIONAL)     0 each    Statin not prescribed intentionally due to Other patient declines (This option does not exclude patient from measure)       triamterene-hydrochlorothiazide  37.5-25 MG per capsule    DYAZIDE    90 capsule    TAKE 1 CAPSULE DAILY.       vitamin D 1000 UNITS capsule     90 capsule    Take 1 capsule by mouth daily

## 2017-06-07 ENCOUNTER — HOSPITAL ENCOUNTER (OUTPATIENT)
Dept: CT IMAGING | Facility: HOSPITAL | Age: 82
Discharge: HOME | End: 2017-06-07
Attending: FAMILY MEDICINE | Admitting: FAMILY MEDICINE
Payer: COMMERCIAL

## 2017-06-07 ENCOUNTER — OFFICE VISIT (OUTPATIENT)
Dept: INFUSION THERAPY | Facility: HOSPITAL | Age: 82
End: 2017-06-07
Attending: FAMILY MEDICINE
Payer: COMMERCIAL

## 2017-06-07 VITALS
HEART RATE: 60 BPM | RESPIRATION RATE: 16 BRPM | SYSTOLIC BLOOD PRESSURE: 132 MMHG | DIASTOLIC BLOOD PRESSURE: 67 MMHG | OXYGEN SATURATION: 100 % | TEMPERATURE: 97.6 F

## 2017-06-07 DIAGNOSIS — I72.8 SPLENIC ARTERY ANEURYSM (H): Primary | ICD-10-CM

## 2017-06-07 PROCEDURE — 71260 CT THORAX DX C+: CPT | Mod: TC

## 2017-06-07 PROCEDURE — 96360 HYDRATION IV INFUSION INIT: CPT | Mod: 59

## 2017-06-07 PROCEDURE — 74160 CT ABDOMEN W/CONTRAST: CPT | Mod: TC

## 2017-06-07 PROCEDURE — 25000128 H RX IP 250 OP 636: Performed by: RADIOLOGY

## 2017-06-07 PROCEDURE — 96361 HYDRATE IV INFUSION ADD-ON: CPT

## 2017-06-07 RX ORDER — SODIUM CHLORIDE 9 MG/ML
INJECTION, SOLUTION INTRAVENOUS CONTINUOUS
Status: DISCONTINUED | OUTPATIENT
Start: 2017-06-08 | End: 2017-06-07 | Stop reason: HOSPADM

## 2017-06-07 RX ORDER — SODIUM CHLORIDE 9 MG/ML
INJECTION, SOLUTION INTRAVENOUS CONTINUOUS
Status: CANCELLED
Start: 2017-06-08

## 2017-06-07 RX ORDER — IOPAMIDOL 612 MG/ML
100 INJECTION, SOLUTION INTRAVASCULAR ONCE
Status: COMPLETED | OUTPATIENT
Start: 2017-06-07 | End: 2017-06-07

## 2017-06-07 RX ADMIN — SODIUM CHLORIDE 1000 ML: 9 INJECTION, SOLUTION INTRAVENOUS at 08:48

## 2017-06-07 RX ADMIN — IOPAMIDOL 100 ML: 612 INJECTION, SOLUTION INTRAVASCULAR at 10:10

## 2017-06-07 RX ADMIN — DIATRIZOATE MEGLUMINE AND DIATRIZOATE SODIUM 30 ML: 660; 100 SOLUTION ORAL; RECTAL at 10:10

## 2017-06-07 ASSESSMENT — PAIN SCALES - GENERAL: PAINLEVEL: NO PAIN (0)

## 2017-06-07 NOTE — MR AVS SNAPSHOT
"              After Visit Summary   6/7/2017    Lauren Barron    MRN: [id number]           Patient Information     Date Of Birth          1/17/1926        Visit Information        Provider Department      6/7/2017 8:00 AM [facility]  [facility]        Today's Diagnoses     Splenic artery aneurysm (H)    -  1       Follow-ups after your visit        Your next 10 appointments already scheduled     Jul 06, 2017 10:45 AM CDT   (Arrive by 10:30 AM)   SHORT with Vandana Jones MD   [facility] ([facility])    [address]  [place] [address]   [phone]              Who to contact     If you have questions or need follow up information about today's clinic visit or your schedule please contact [facility] directly at [phone].  Normal or non-critical lab and imaging results will be communicated to you by MyChart, letter or phone within 4 business days after the clinic has received the results. If you do not hear from us within 7 days, please contact the clinic through MyChart or phone. If you have a critical or abnormal lab result, we will notify you by phone as soon as possible.  Submit refill requests through Needl or call your pharmacy and they will forward the refill request to us. Please allow 3 business days for your refill to be completed.          Additional Information About Your Visit        MyChart Information     Needl lets you send messages to your doctor, view your test results, renew your prescriptions, schedule appointments and more. To sign up, go to [email or web address] . Click on \"Log in\" on the left side of the screen, which will take you to the Welcome page. Then click on \"Sign up Now\" on the right side of the page.     You will be asked to enter the access code listed below, as well as some personal information. Please follow the directions to create your username and password.     Your access code is: [email or web address]  Expires: 8/29/2017 " 10:26 AM     Your access code will  in 90 days. If you need help or a new code, please call your [facility] or [phone].        Care EveryWhere ID     This is your Care EveryWhere ID. This could be used by other organizations to access your [facility] medical records  [id number]        Your Vitals Were     Pulse Temperature Respirations Pulse Oximetry          60 97.6  F (36.4  C) (Oral) 16 100%         Blood Pressure from Last 3 Encounters:   17 132/67   17 118/52   17 142/56    Weight from Last 3 Encounters:   17 58.1 kg (128 lb)   17 56.2 kg (124 lb)   16 56.7 kg (125 lb)              Today, you had the following     No orders found for display       Primary Care Provider Office Phone # Fax #    Vandana Jones [phone]       [facility] [place] [address] [facility] [facility] [address]        Thank you!     Thank you for choosing [facility]  for your care. Our goal is always to provide you with excellent care. Hearing back from our patients is one way we can continue to improve our services. Please take a few minutes to complete the written survey that you may receive in the mail after your visit with us. Thank you!             Your Updated Medication List - Protect others around you: Learn how to safely use, store and throw away your medicines at www.disposemymeds.org.          This list is accurate as of: 17  2:07 PM.  Always use your most recent med list.                   Brand Name Dispense Instructions for use    acetaminophen 500 MG tablet    TYLENOL    100 tablet    Take 2 tablet (1000mg) by oral route every 6 hours as needed       amLODIPine 5 MG tablet    NORVASC    90 tablet    TAKE (1) TABLET DAILY.       benazepril 40 MG tablet    LOTENSIN    180 tablet    TAKE  (1)  TABLET TWICE A DAY.       calcium 1500 MG Tabs     100 tablet    Take 1 tablet by mouth daily       isosorbide mononitrate 30 MG 24 hr tablet    IMDUR    90  tablet    TAKE ONE TABLET AT BEDTIME.       LORazepam 0.5 MG tablet    ATIVAN    20 tablet    Take 1 tablet (0.5 mg) by mouth every 8 hours as needed for anxiety       Multiple vitamin Tabs     900 tablet    Take 1 tablet by oral route every day with foodTake 1 tablet by oral route every day with food       nitroglycerin 0.4 MG sublingual tablet    NITROSTAT    25 tablet    Place 1 tablet (0.4mg) by sublingual route at the first sign of attack; may repeat ever 5 min until relief; if pain persists after 3 tablets in 15 minutes prompt medical attention is recommended. AS NEEDED       STATIN NOT PRESCRIBED (INTENTIONAL)     0 each    Statin not prescribed intentionally due to Other patient declines (This option does not exclude patient from measure)       triamterene-hydrochlorothiazide 37.5-25 MG per capsule    DYAZIDE    90 capsule    TAKE 1 CAPSULE DAILY.       vitamin D 1000 UNITS capsule     90 capsule    Take 1 capsule by mouth daily

## 2017-06-07 NOTE — PROGRESS NOTES
0810: Pt ambulated independently into room.  Pt accompanied by her daughter. Pt and dtr very pleasant. Pt denies pain.   0830: 18 gauge IV LT AC started.   0845: NS started at 203 ml/hr.  0900: Pt started oral contrast.  1000: To CT for abdominal scan.  1030: Pt returned  1035: IV fluids restarted but at 174 ml/hr as per order.  1110: Pt eating lunch. Pt with good appetite.   1145: Pt ambulated to bathroom with standby assistance only. Pt tolerated activity well.  1215: Pt finished lunch. Tolerated well.  1245: Pt dozing in her chair.   1300: Pt ambulated to the bathroom with standby assistance only, tolerated activity well.   1335: IV NS fluid completed as per order. Pt tolerated well.   1340: IV discontinued. IV site dressed with gauze and coban. Pt discharged to home ambulatory accompanied by her daughter. Pt declined AVS.

## 2017-06-08 PROBLEM — R91.8 PULMONARY NODULES: Status: ACTIVE | Noted: 2017-06-08

## 2017-06-26 DIAGNOSIS — F41.9 ANXIETY: ICD-10-CM

## 2017-06-26 RX ORDER — LORAZEPAM 0.5 MG/1
0.5 TABLET ORAL EVERY 8 HOURS PRN
Qty: 20 TABLET | Refills: 0 | Status: SHIPPED | OUTPATIENT
Start: 2017-06-26 | End: 2017-07-06

## 2017-07-06 ENCOUNTER — OFFICE VISIT (OUTPATIENT)
Dept: FAMILY MEDICINE | Facility: OTHER | Age: 82
End: 2017-07-06
Attending: FAMILY MEDICINE
Payer: COMMERCIAL

## 2017-07-06 VITALS
DIASTOLIC BLOOD PRESSURE: 58 MMHG | BODY MASS INDEX: 23.62 KG/M2 | HEART RATE: 60 BPM | OXYGEN SATURATION: 99 % | SYSTOLIC BLOOD PRESSURE: 100 MMHG | WEIGHT: 125 LBS

## 2017-07-06 DIAGNOSIS — I25.9 CHRONIC ISCHEMIC HEART DISEASE, UNSPECIFIED: ICD-10-CM

## 2017-07-06 DIAGNOSIS — I73.9 CLAUDICATION IN PERIPHERAL VASCULAR DISEASE (H): Primary | ICD-10-CM

## 2017-07-06 DIAGNOSIS — F41.9 ANXIETY: ICD-10-CM

## 2017-07-06 DIAGNOSIS — J84.10 PULMONARY FIBROSIS (H): ICD-10-CM

## 2017-07-06 PROCEDURE — 99214 OFFICE O/P EST MOD 30 MIN: CPT | Performed by: FAMILY MEDICINE

## 2017-07-06 PROCEDURE — 99212 OFFICE O/P EST SF 10 MIN: CPT

## 2017-07-06 RX ORDER — ISOSORBIDE MONONITRATE 30 MG/1
TABLET, EXTENDED RELEASE ORAL
Qty: 90 TABLET | Refills: 3 | Status: SHIPPED | OUTPATIENT
Start: 2017-07-06 | End: 2018-04-09

## 2017-07-06 RX ORDER — LORAZEPAM 0.5 MG/1
0.5 TABLET ORAL EVERY 8 HOURS PRN
Qty: 30 TABLET | Refills: 0 | Status: SHIPPED | OUTPATIENT
Start: 2017-07-06 | End: 2017-08-29

## 2017-07-06 RX ORDER — PREDNISONE 10 MG/1
10 TABLET ORAL DAILY
Qty: 90 TABLET | Refills: 1 | Status: SHIPPED | OUTPATIENT
Start: 2017-07-06 | End: 2017-11-20

## 2017-07-06 ASSESSMENT — PAIN SCALES - GENERAL: PAINLEVEL: NO PAIN (0)

## 2017-07-06 NOTE — MR AVS SNAPSHOT
After Visit Summary   7/6/2017    Lauren Barron    MRN: [id number]           Patient Information     Date Of Birth          1/17/1926        Visit Information        Provider Department      7/6/2017 10:45 AM Vandana Jones MD [facility] [place]        Today's Diagnoses     Claudication in peripheral vascular disease (H)    -  1    Anxiety        Chronic ischemic heart disease, unspecified        Pulmonary fibrosis (H)           Follow-ups after your visit        Additional Services     VASCULAR SURGERY REFERRAL       Your provider has referred you to: Dr Connor for evaluation of vascular claudication    Please be aware that coverage of these services is subject to the terms and limitations of your health insurance plan.  Call member services at your health plan with any benefit or coverage questions.      Please bring the following with you to your appointment:    (1) Any X-Rays, CTs or MRIs which have been performed.  Contact the facility where they were done to arrange for  prior to your scheduled appointment.    (2) List of current medications   (3) This referral request   (4) Any documents/labs given to you for this referral                  Follow-up notes from your care team     Return in about 4 weeks (around 8/3/2017) for pulmonary fibrosis. claudition.      Your next 10 appointments already scheduled     Aug 14, 2017 11:00 AM CDT   (Arrive by 10:45 AM)   SHORT with Vandana Jones MD   [facility] ([facility] )    [address] [place] [address]   [phone]              Who to contact     If you have questions or need follow up information about today's clinic visit or your schedule please contact [facility] [place] directly at [phone].  Normal or non-critical lab and imaging results will be communicated to you by MyChart, letter or phone within 4 business days after the clinic has received the results. If you do not hear  "from us within 7 days, please contact the clinic through [employer] or phone. If you have a critical or abnormal lab result, we will notify you by phone as soon as possible.  Submit refill requests through [employer] or call your pharmacy and they will forward the refill request to us. Please allow 3 business days for your refill to be completed.          Additional Information About Your Visit        [employer] Information     [employer] lets you send messages to your doctor, view your test results, renew your prescriptions, schedule appointments and more. To sign up, go to [email or web address] . Click on \"Log in\" on the left side of the screen, which will take you to the Welcome page. Then click on \"Sign up Now\" on the right side of the page.     You will be asked to enter the access code listed below, as well as some personal information. Please follow the directions to create your username and password.     Your access code is: [email or web address]  Expires: 2017 10:26 AM     Your access code will  in 90 days. If you need help or a new code, please call your [facility] clinic or [phone].        Care EveryWhere ID     This is your Care EveryWhere ID. This could be used by other organizations to access your [facility] medical records  [id number]        Your Vitals Were     Pulse Pulse Oximetry Breastfeeding? BMI (Body Mass Index)          60 99% No 23.62 kg/m2         Blood Pressure from Last 3 Encounters:   17 100/58   17 132/67   17 118/52    Weight from Last 3 Encounters:   17 125 lb (56.7 kg)   17 128 lb (58.1 kg)   17 124 lb (56.2 kg)              We Performed the Following     VASCULAR SURGERY REFERRAL          Today's Medication Changes          These changes are accurate as of: 17 11:41 AM.  If you have any questions, ask your nurse or doctor.               Start taking these medicines.        Dose/Directions    predniSONE 10 MG tablet   Commonly known as:  DELTASONE "   Used for:  Pulmonary fibrosis (H)   Started by:  Vandana Jones MD        Dose:  10 mg   Take 1 tablet (10 mg) by mouth daily   Quantity:  90 tablet   Refills:  1         These medicines have changed or have updated prescriptions.        Dose/Directions    isosorbide mononitrate 30 MG 24 hr tablet   Commonly known as:  IMDUR   This may have changed:  See the new instructions.   Used for:  Chronic ischemic heart disease, unspecified   Changed by:  Vandana Jones MD        TAKE ONE TABLET AT BEDTIME.   Quantity:  90 tablet   Refills:  3            Where to get your medicines      These medications were sent to [facility] [place] [address]  [address], [facility] [address]     Phone:  [phone]     isosorbide mononitrate 30 MG 24 hr tablet         Some of these will need a paper prescription and others can be bought over the counter.  Ask your nurse if you have questions.     Bring a paper prescription for each of these medications     LORazepam 0.5 MG tablet         Call your pharmacy to confirm that your medication is ready for pickup. It may take up to 24 hours for them to receive the prescription. If the prescription is not ready within 3 business days, please contact your clinic or your provider.     We will let you know when these medications are ready. If you don't hear back within 3 business days, please contact us.     predniSONE 10 MG tablet                Primary Care Provider Office Phone # Fax #    Vandana Jones [phone]       [facility] [address] [facility] [address]        Equal Access to Services     [facility]: Hadii aad ku hadasho Soomaali, waaxda luqadaha, qaybta kaalmada adeegyada, marilu mares . So [facility] [phone].    ATENCIÓN: Si habla español, tiene a leon disposición servicios gratuitos de asistencia lingüística. Llame al [phone].    We comply with applicable federal civil  rights laws and [place] laws. We do not discriminate on the basis of race, color, national origin, age, disability sex, sexual orientation or gender identity.            Thank you!     Thank you for choosing [facility]  for your care. Our goal is always to provide you with excellent care. Hearing back from our patients is one way we can continue to improve our services. Please take a few minutes to complete the written survey that you may receive in the mail after your visit with us. Thank you!             Your Updated Medication List - Protect others around you: Learn how to safely use, store and throw away your medicines at www.disposemymeds.org.          This list is accurate as of: 7/6/17 11:41 AM.  Always use your most recent med list.                   Brand Name Dispense Instructions for use Diagnosis    acetaminophen 500 MG tablet    TYLENOL    100 tablet    Take 2 tablet (1000mg) by oral route every 6 hours as needed    Other unknown and unspecified cause of morbidity or mortality       amLODIPine 5 MG tablet    NORVASC    90 tablet    TAKE (1) TABLET DAILY.    Essential hypertension with goal blood pressure less than 140/90       benazepril 40 MG tablet    LOTENSIN    180 tablet    TAKE  (1)  TABLET TWICE A DAY.    Essential hypertension with goal blood pressure less than 140/90       calcium 1500 MG Tabs     100 tablet    Take 1 tablet by mouth daily    Healthcare maintenance       isosorbide mononitrate 30 MG 24 hr tablet    IMDUR    90 tablet    TAKE ONE TABLET AT BEDTIME.    Chronic ischemic heart disease, unspecified       LORazepam 0.5 MG tablet    ATIVAN    30 tablet    Take 1 tablet (0.5 mg) by mouth every 8 hours as needed for anxiety    Anxiety       Multiple vitamin Tabs     900 tablet    Take 1 tablet by oral route every day with foodTake 1 tablet by oral route every day with food    Health care maintenance       nitroGLYcerin 0.4 MG sublingual tablet    NITROSTAT    25 tablet     Place 1 tablet (0.4mg) by sublingual route at the first sign of attack; may repeat ever 5 min until relief; if pain persists after 3 tablets in 15 minutes prompt medical attention is recommended. AS NEEDED    Chronic ischemic heart disease, unspecified       predniSONE 10 MG tablet    DELTASONE    90 tablet    Take 1 tablet (10 mg) by mouth daily    Pulmonary fibrosis (H)       STATIN NOT PRESCRIBED (INTENTIONAL)     0 each    Statin not prescribed intentionally due to Other patient declines (This option does not exclude patient from measure)    Essential hypertension with goal blood pressure less than 140/90       triamterene-hydrochlorothiazide 37.5-25 MG per capsule    DYAZIDE    90 capsule    TAKE 1 CAPSULE DAILY.    Benign essential hypertension       vitamin D 1000 UNITS capsule     90 capsule    Take 1 capsule by mouth daily    Health care maintenance

## 2017-07-06 NOTE — NURSING NOTE
"Chief Complaint   Patient presents with     Hypertension       Initial /58  Pulse 60  Wt 125 lb (56.7 kg)  SpO2 99%  Breastfeeding? No  BMI 23.62 kg/m2 Estimated body mass index is 23.62 kg/(m^2) as calculated from the following:    Height as of 12/5/16: 5' 1\" (1.549 m).    Weight as of this encounter: 125 lb (56.7 kg).  Medication Reconciliation: complete   Lucero Mckeon      "

## 2017-07-07 NOTE — PROGRESS NOTES
[facility]    Lauren Barron, 89 year old, female presents with   Chief Complaint   Patient presents with     Hypertension     Pain     pain of the arms and legs when she is walking along with pain of the left lateral abdomin again with walking. She cannot walk more than a block without stopping to rest. This has recently become works. She has known ASCVD and TIAs in the past as well as a splenic artery aneurysm which has been evaluated by Dr Connor in the past. She was on prednisone for pulmonary fibrosis which was stopped about a month ago by Dr Worley as she felt it wasn't helpful. Her daughters today note that her breathing is worse now that she has been weaned off of this. She has no neck or lumbar pain.       PAST MEDICAL HISTORY:  Past Medical History:   Diagnosis Date     Allergic rhinitis, cause unspecified 07/25/2011     Aneurysm of splenic artery (H) 03/31/2014    essentially normal for age, heavily calcified, normal per Dr Connor, no further follow up of this needed.     Aortic insufficiency 6/2/2015    moderate, aortic sclerosis without stenosis echo 5/6/2015      ASCVD (arteriosclerotic cardiovascular disease) 08/25/2009    Bare metal stent obtuse margnial     Atherosclerosis of aorta (H) 03/12/2012    ECHO 2/2012     Cystocele 03/22/2012     Diastolic dysfunction 03/12/2012     GI bleed 07/25/2011    EGD-H Pylori, treated, Colonoscopy small Polyp - transfused 3 units of blood.     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 DEL CASTILLO             REMOVAL OF OVARIAN CYST(S)       HYSTERECTOMY, YANG      Metorrhagia     IMPLANT PACEMAKER  2016     Left Subclavian Line, Triple Lumen      Gastric Ulcer, Dieulfoy lesion, hemostatic clips placed - Massive GI Bleed - Transferred to [facility]     RELEASE CARPAL TUNNEL      RIGHT - Carpal Tunnel Syndrome     REPAIR BLADDER         SOCIAL HISTORY  Social History     Social History     Marital status:      Spouse name: N/A     Number of children: N/A     Years of education: N/A     Occupational History     Not on file.     Social History Main Topics     Smoking status: Never Smoker     Smokeless tobacco: Never Used     Alcohol use No     Drug use: No     Sexual activity: No      Comment:      Other Topics Concern      Service No     Blood Transfusions Yes     Permits if needed     Caffeine Concern Yes     Coffee - 2 cups     Seat Belt Yes     Self-Exams Yes     Parent/Sibling W/ Cabg, Mi Or Angioplasty Before 65f 55m? No     Social History Narrative       MEDICATIONS:  Prior to Admission medications    Medication Sig Start Date End Date Taking? Authorizing Provider   LORazepam (ATIVAN) 0.5 MG tablet Take 1 tablet (0.5 mg) by mouth every 8 hours as needed for anxiety 17  Yes Vandana Jones MD   isosorbide mononitrate (IMDUR) 30 MG 24 hr tablet TAKE ONE TABLET AT BEDTIME. 17  Yes Vandana Jones MD   predniSONE (DELTASONE) 10 MG tablet Take 1 tablet (10 mg) by mouth daily 17  Yes Vandana Jones MD   nitroglycerin (NITROSTAT) 0.4 MG sublingual tablet Place 1 tablet (0.4mg) by sublingual route at the  first sign of attack; may repeat ever 5 min until relief; if pain persists after 3 tablets in 15 minutes prompt medical attention is recommended. AS NEEDED 6/6/17  Yes Vandana Jones MD   triamterene-hydrochlorothiazide (DYAZIDE) 37.5-25 MG per capsule TAKE 1 CAPSULE DAILY. 5/19/17  Yes Vandana Jones MD   amLODIPine (NORVASC) 5 MG tablet TAKE (1) TABLET DAILY. 12/28/16  Yes Vandana Jones MD   benazepril (LOTENSIN) 40 MG tablet TAKE  (1)  TABLET TWICE A DAY. 12/28/16  Yes Vandana Jones MD   STATIN NOT PRESCRIBED, INTENTIONAL, Statin not prescribed intentionally due to Other patient declines (This option does not exclude patient from measure) 9/8/16  Yes Vandana Jones MD   calcium 1500 MG TABS Take 1 tablet by mouth daily 12/24/13  Yes Vandana Jones MD   acetaminophen (TYLENOL) 500 MG tablet Take 2 tablet (1000mg) by oral route every 6 hours as needed 12/23/13  Yes Vandana Jones MD   Cholecalciferol (VITAMIN D) 1000 UNITS capsule Take 1 capsule by mouth daily 12/23/13  Yes Vanadna Jones MD   Multiple vitamin TABS Take 1 tablet by oral route every day with foodTake 1 tablet by oral route every day with food 12/23/13  Yes Vandaan Jones MD       ALLERGIES:     Allergies   Allergen Reactions     [place] Juice Other (See Comments), Nausea and GI Disturbance     Vertigo     Food      Oranges.     Naprosyn [Naproxen]      Incontinence       Niacin Swelling       ROS:  C: NEGATIVE for fever, chills, change in weight  I: NEGATIVE for worrisome rashes, moles or lesions  R: NEGATIVE for significant cough or SOB  CV: NEGATIVE for chest pain, palpitations or peripheral edema  GI: NEGATIVE for nausea, abdominal pain, heartburn, or change in bowel habits  M: NEGATIVE for significant arthralgias or myalgia  N: NEGATIVE for weakness, dizziness or paresthesias  P: NEGATIVE for changes in mood or affect    EXAM:  /58  Pulse 60  Wt 125 lb (56.7 kg)  SpO2 99%  Breastfeeding? No  BMI 23.62 kg/m2 Body mass index  is 23.62 kg/(m^2).   GENERAL APPEARANCE: healthy, alert and no distress  NECK: no adenopathy, no asymmetry, masses, or scars and thyroid normal to palpation  RESP: lungs clear to auscultation - no rales, rhonchi or wheezes  CV: regular rates and rhythm, normal S1 S2, no S3 or S4 and grade 1/6 DIVYA murmur heard best over the LSB  LYMPHATICS: normal ant/post cervical and supraclavicular nodes  ABDOMEN: soft, nontender, without hepatosplenomegaly or masses and bowel sounds normal  MS: extremities normal- no gross deformities noted and no edema, DP pulses are not palpable  SKIN: no suspicious lesions or rashes  NEURO: Normal strength and tone, mentation intact and speech normal  PSYCH: mentation appears normal and affect normal/bright    LABS AND IMAGING:     Results for orders placed or performed in visit on 06/06/17   CT Abdomen w Contrast    Narrative    CT SCAN OF ABDOMEN AND PELVIS WITH IV CONTRAST    REPORT:  The liver is free of masses.  No gallstones are seen.  The  spleen is normal in size.  There is a densely calcified small splenic  artery aneurysm noted, measuring 1.4 cm in diameter.  The adrenal  glands appear normal.  Right and left kidneys are free of masses or  hydronephrosis.  No pancreatic masses seen.  There are no adrenal  masses.  There is some small cysts seen in the kidneys.  No  hydronephrosis is noted.  Periaortic lymph nodes are normal in  caliber.  No abnormality is seen in the upper peritoneal cavity.  There is compression of the superior endplate of the L2 vertebra of  uncertain age.  There is spondylolisthesis of L3 on L4 due to facet  joint degenerative change.    IMPRESSION:  BENIGN CYST IN THE KIDNEYS.  MILD L2 COMPRESSION  FRACTURE, STABLE FROM MARCH OF 2014.  Exam Date: Jun 07, 2017 10:17:24 AM  Author: GALLITO HENRY  This report is final and signed     CT Chest w Contrast    Narrative    CT SCAN OF ABDOMEN AND PELVIS WITH IV CONTRAST    REPORT:  There is a large hiatal hernia.  There  is some linear  atelectasis or scarring seen in both lungs.   There is a small nodule  seen in the left lung measuring 7 mm in diameter.  Follow up CT scan  in six months time is recommended to further evaluate this nodule,  best seen on axial image #35.  There is no hilar or mediastinal masses  or lymphadenopathy. Axillary and supraclavicular lymph nodes appear  normal.  No pleural abnormalities are seen.  There is old healed rib  fractures noted on the left.  There are moderate degenerative changes  in the thoracic spine.    IMPRESSION:  1.  LARGE HIATAL HERNIA.    2.  SMALL NODULE IN THE LEFT LUNG.  FOLLOW UP CT SCAN IN SIX MONTHS  TIME IS RECOMMENDED.  Exam Date: Jun 07, 2017 10:17:21 AM  Author: GALLITO HENRY  This report is final and signed     CBC with platelets   Result Value Ref Range    WBC 7.7 4.0 - 11.0 10e9/L    RBC Count 3.87 3.8 - 5.2 10e12/L    Hemoglobin 11.6 (L) 11.7 - 15.7 g/dL    Hematocrit 34.2 (L) 35.0 - 47.0 %    MCV 88 78 - 100 fl    MCH 30.0 26.5 - 33.0 pg    MCHC 33.9 31.5 - 36.5 g/dL    RDW 14.1 10.0 - 15.0 %    Platelet Count 210 150 - 450 10e9/L   Comprehensive metabolic panel   Result Value Ref Range    Sodium 141 133 - 144 mmol/L    Potassium 4.4 3.4 - 5.3 mmol/L    Chloride 110 (H) 94 - 109 mmol/L    Carbon Dioxide 23 20 - 32 mmol/L    Anion Gap 8 3 - 14 mmol/L    Glucose 92 70 - 99 mg/dL    Urea Nitrogen 55 (H) 7 - 30 mg/dL    Creatinine 1.49 (H) 0.52 - 1.04 mg/dL    GFR Estimate 33 (L) >60 mL/min/1.7m2    GFR Estimate If Black 40 (L) >60 mL/min/1.7m2    Calcium 9.2 8.5 - 10.1 mg/dL    Bilirubin Total 0.5 0.2 - 1.3 mg/dL    Albumin 3.6 3.4 - 5.0 g/dL    Protein Total 7.0 6.8 - 8.8 g/dL    Alkaline Phosphatase 36 (L) 40 - 150 U/L    ALT 32 0 - 50 U/L    AST 22 0 - 45 U/L         ASSESSMENT/PLAN:  (I73.9) Claudication in peripheral vascular disease (H)  (primary encounter diagnosis)  Comment:   Plan: VASCULAR SURGERY REFERRAL        CT scan of abdomen and pelvis were done one month  ago with findings noted above. Will refer to Dr Connor for consideration of vascular claudication. If this is negative would consider MRI to rule out spinal stenosis as a cause of her pain    (F41.9) Anxiety  Comment:   Plan: LORazepam (ATIVAN) 0.5 MG tablet        renewed    (I25.9) Chronic ischemic heart disease, unspecified  Comment:   Plan: isosorbide mononitrate (IMDUR) 30 MG 24 hr         tablet        Renewed, stable     (J84.10) Pulmonary fibrosis (H)  Comment:  Plan: predniSONE (DELTASONE) 10 MG tablet        Will restart this and recheck in 4 weeks, sooner for concerns      Vandana Jones MD  July 6, 2017

## 2017-07-11 ENCOUNTER — OFFICE VISIT (OUTPATIENT)
Dept: OTOLARYNGOLOGY | Facility: OTHER | Age: 82
End: 2017-07-11
Attending: NURSE PRACTITIONER
Payer: COMMERCIAL

## 2017-07-11 VITALS
HEIGHT: 60 IN | BODY MASS INDEX: 24.54 KG/M2 | DIASTOLIC BLOOD PRESSURE: 54 MMHG | WEIGHT: 125 LBS | TEMPERATURE: 98.5 F | SYSTOLIC BLOOD PRESSURE: 112 MMHG | HEART RATE: 66 BPM | OXYGEN SATURATION: 98 %

## 2017-07-11 DIAGNOSIS — H61.23 BILATERAL IMPACTED CERUMEN: Primary | ICD-10-CM

## 2017-07-11 PROCEDURE — 99213 OFFICE O/P EST LOW 20 MIN: CPT

## 2017-07-11 PROCEDURE — 69210 REMOVE IMPACTED EAR WAX UNI: CPT | Performed by: NURSE PRACTITIONER

## 2017-07-11 PROCEDURE — 99213 OFFICE O/P EST LOW 20 MIN: CPT | Mod: 25 | Performed by: NURSE PRACTITIONER

## 2017-07-11 PROCEDURE — 92504 EAR MICROSCOPY EXAMINATION: CPT | Mod: TC | Performed by: NURSE PRACTITIONER

## 2017-07-11 PROCEDURE — 99204 OFFICE O/P NEW MOD 45 MIN: CPT

## 2017-07-11 ASSESSMENT — PAIN SCALES - GENERAL: PAINLEVEL: NO PAIN (0)

## 2017-07-11 NOTE — PROGRESS NOTES
Otolaryngology Progress Note           Chief Complaint:     Patient presents with:  Cerumen Impaction: ear cleaning          History of Present Illness:     Lauren Barron is a 89 year old female is here today for ear cleaning. Last had her ears cleaned in ENT about 3-4 years ago. Recently bilateral ears have been feeling more plugged L>R. There is no otalgia or otorrhea. No tinnitus. No vertigo.  When ears are without cerumen, she has no concerns with her hearing.   No COM. No otologic surgeries.  No significant noise exposure.  Family history: Mom, 2 sisters and 1 brother have needed hearing aides.          Physical Exam:     /54 (BP Location: Right arm, Patient Position: Chair, Cuff Size: Adult Regular)  Pulse 66  Temp 98.5  F (36.9  C) (Tympanic)  Ht 5' (1.524 m)  Wt 125 lb (56.7 kg)  SpO2 98%  BMI 24.41 kg/m2  General - The patient is well nourished and well developed, and appears to have good nutritional status.  Alert and oriented to person and place, interactive.  Head and Face - Normocephalic and atraumatic, with no gross asymmetry noted of the contour of the facial features.  The facial nerve is intact, with strong symmetric movements.  Neck-no palpable lymphadenopathy or thyroid mass.  Trachea is midline.  Eyes - Extraocular movements intact.   Ears- External ears normal. The ear canals were examined underneath the operating microscope and with an otologic speculum. The canals were cleaned of all debris with gently suctioning. Canals very narrow. There is no granulation tissue or sign of cholesteatoma. The patient tolerates this well. TM's are intact without effusion.  Nose - Nasal mucosa is pink and moist with no abnormal mucus.  The septum was grossly midline and non-obstructive, turbinates of normal size and position.  No polyps, masses, or purulence noted on examination.  Mouth - Examination of the oral cavity shows pink, healthy, moist mucosa. Upper dentures. Remaining dentition in  good condition. No lesions or ulceration noted. The tongue is mobile and midline.  Throat - The walls of the oropharynx were smooth, pink, moist, symmetric, and had no lesions or ulcerations.  The tonsillar pillars and soft palate were symmetric.  The uvula was midline on elevation.           Assessment and Plan:       ICD-10-CM    1. Bilateral impacted cerumen H61.23      The ears were cleaned today.  The patient may return here as needed or with their primary physician.  Aural hygiene was discussed and brochure was given to the patient highlighting care of the ear canal.  Avoidance of Q-tips was reiterated. The patient was told to avoid flushing the ear canal if there is a possibility of a hole or perforation in the tympanic membrane. Dry ear precautions were also reviewed.    She will set up audiogram as it has been > 3 years, then recommended annual audiogram.    Follow up in ENT as needed.       Gladys Perez NP  ENT  [facility], [place]  [phone]

## 2017-07-11 NOTE — MR AVS SNAPSHOT
"              After Visit Summary   7/11/2017    Lauren Barron    MRN: [id number]           Patient Information     Date Of Birth          1/17/1926        Visit Information        Provider Department      7/11/2017 9:15 AM Gladys Perez APRN CNP [facility]        Today's Diagnoses     Bilateral impacted cerumen    -  1      Care Instructions    Ears were cleaned today.  They look good.     Recommend setting up an audiogram, then yearly audiograms.    Follow up in ENT as needed.    If there are questions or concerns, call [phone] and ask for nurse.            Follow-ups after your visit        Follow-up notes from your care team     Return for set up audiogram.      Your next 10 appointments already scheduled     Aug 14, 2017 11:00 AM CDT   (Arrive by 10:45 AM)   SHORT with Vandana Jones MD   [facility] [place] ([facility] [place] )    [address]  [place] [address]   [phone]              Who to contact     If you have questions or need follow up information about today's clinic visit or your schedule please contact [facility] directly at [phone].  Normal or non-critical lab and imaging results will be communicated to you by MyChart, letter or phone within 4 business days after the clinic has received the results. If you do not hear from us within 7 days, please contact the clinic through MyChart or phone. If you have a critical or abnormal lab result, we will notify you by phone as soon as possible.  Submit refill requests through [employer] [facility] or call your pharmacy and they will forward the refill request to us. Please allow 3 business days for your refill to be completed.          Additional Information About Your Visit        MyChart Information     [employer] lets you send messages to your doctor, view your test results, renew your prescriptions, schedule appointments and more. To sign up, go to [email or web address] . Click on \"Log in\" on the " "left side of the screen, which will take you to the Welcome page. Then click on \"Sign up Now\" on the right side of the page.     You will be asked to enter the access code listed below, as well as some personal information. Please follow the directions to create your username and password.     Your access code is: [email or web address]  Expires: 2017 10:26 AM     Your access code will  in 90 days. If you need help or a new code, please call your [place] clinic or [phone].        Care EveryWhere ID     This is your Care EveryWhere ID. This could be used by other organizations to access your [place] medical records  [id number]        Your Vitals Were     Pulse Temperature Height Pulse Oximetry BMI (Body Mass Index)       66 98.5  F (36.9  C) (Tympanic) 5' (1.524 m) 98% 24.41 kg/m2        Blood Pressure from Last 3 Encounters:   17 112/54   17 100/58   17 132/67    Weight from Last 3 Encounters:   17 125 lb (56.7 kg)   17 125 lb (56.7 kg)   17 128 lb (58.1 kg)              Today, you had the following     No orders found for display       Primary Care Provider Office Phone # Fax #    Vandana Jones [phone]       [facility] [place] [address] [facility] [address]        Equal Access to Services     [facility]: Hadii monty ku hadasho Soomaali, waaxda luqadaha, qaybta kaalmada adeegyada, marilu mares . So [facility] [phone].    ATENCIÓN: Si habla adri, tiene a leon disposición servicios gratuitos de asistencia lingüística. Llame al [phone].    We comply with applicable federal civil rights laws and [place] laws. We do not discriminate on the basis of race, color, national origin, age, disability sex, sexual orientation or gender identity.            Thank you!     Thank you for choosing [facility] [place]  for your care. Our goal is always to provide you with excellent care. Hearing back from our patients " is one way we can continue to improve our services. Please take a few minutes to complete the written survey that you may receive in the mail after your visit with us. Thank you!             Your Updated Medication List - Protect others around you: Learn how to safely use, store and throw away your medicines at www.disposemymeds.org.          This list is accurate as of: 7/11/17  9:45 AM.  Always use your most recent med list.                   Brand Name Dispense Instructions for use Diagnosis    acetaminophen 500 MG tablet    TYLENOL    100 tablet    Take 2 tablet (1000mg) by oral route every 6 hours as needed    Other unknown and unspecified cause of morbidity or mortality       amLODIPine 5 MG tablet    NORVASC    90 tablet    TAKE (1) TABLET DAILY.    Essential hypertension with goal blood pressure less than 140/90       benazepril 40 MG tablet    LOTENSIN    180 tablet    TAKE  (1)  TABLET TWICE A DAY.    Essential hypertension with goal blood pressure less than 140/90       calcium 1500 MG Tabs     100 tablet    Take 1 tablet by mouth daily    Healthcare maintenance       isosorbide mononitrate 30 MG 24 hr tablet    IMDUR    90 tablet    TAKE ONE TABLET AT BEDTIME.    Chronic ischemic heart disease, unspecified       LORazepam 0.5 MG tablet    ATIVAN    30 tablet    Take 1 tablet (0.5 mg) by mouth every 8 hours as needed for anxiety    Anxiety       Multiple vitamin Tabs     900 tablet    Take 1 tablet by oral route every day with foodTake 1 tablet by oral route every day with food    Health care maintenance       nitroGLYcerin 0.4 MG sublingual tablet    NITROSTAT    25 tablet    Place 1 tablet (0.4mg) by sublingual route at the first sign of attack; may repeat ever 5 min until relief; if pain persists after 3 tablets in 15 minutes prompt medical attention is recommended. AS NEEDED    Chronic ischemic heart disease, unspecified       predniSONE 10 MG tablet    DELTASONE    90 tablet    Take 1 tablet (10 mg)  by mouth daily    Pulmonary fibrosis (H)       STATIN NOT PRESCRIBED (INTENTIONAL)     0 each    Statin not prescribed intentionally due to Other patient declines (This option does not exclude patient from measure)    Essential hypertension with goal blood pressure less than 140/90       triamterene-hydrochlorothiazide 37.5-25 MG per capsule    DYAZIDE    90 capsule    TAKE 1 CAPSULE DAILY.    Benign essential hypertension       vitamin D 1000 UNITS capsule     90 capsule    Take 1 capsule by mouth daily    Health care maintenance

## 2017-07-11 NOTE — NURSING NOTE
Chief Complaint   Patient presents with     Cerumen Impaction     ear cleaning        Initial /54 (BP Location: Right arm, Patient Position: Chair, Cuff Size: Adult Regular)  Pulse 66  Temp 98.5  F (36.9  C) (Tympanic)  Ht 5' (1.524 m)  Wt 125 lb (56.7 kg)  SpO2 98%  BMI 24.41 kg/m2 Estimated body mass index is 24.41 kg/(m^2) as calculated from the following:    Height as of this encounter: 5' (1.524 m).    Weight as of this encounter: 125 lb (56.7 kg).  Medication Reconciliation: complete       Deedee Goncalves LPN

## 2017-07-11 NOTE — PATIENT INSTRUCTIONS
Ears were cleaned today.  They look good.     Recommend setting up an audiogram, then yearly audiograms.    Follow up in ENT as needed.    If there are questions or concerns, call [phone] and ask for nurse.

## 2017-07-20 DIAGNOSIS — H91.93 DECREASED HEARING, BILATERAL: Primary | ICD-10-CM

## 2017-08-28 DIAGNOSIS — I10 BENIGN ESSENTIAL HYPERTENSION: ICD-10-CM

## 2017-08-29 DIAGNOSIS — F41.9 ANXIETY: ICD-10-CM

## 2017-08-29 RX ORDER — LORAZEPAM 0.5 MG/1
0.5 TABLET ORAL EVERY 8 HOURS PRN
Qty: 30 TABLET | Refills: 0 | Status: SHIPPED | OUTPATIENT
Start: 2017-08-29 | End: 2017-11-09

## 2017-08-29 RX ORDER — TRIAMTERENE AND HYDROCHLOROTHIAZIDE 37.5; 25 MG/1; MG/1
CAPSULE ORAL
Qty: 90 CAPSULE | Refills: 1 | Status: SHIPPED | OUTPATIENT
Start: 2017-08-29 | End: 2018-03-21

## 2017-08-29 NOTE — TELEPHONE ENCOUNTER
Controlled Substance Refill Request for Ativan  Problem List Complete:  No     PROVIDER TO CONSIDER COMPLETION OF PROBLEM LIST AND OVERVIEW/CONTROLLED SUBSTANCE AGREEMENT    Last Written Prescription Date:  07/06/17  Last Fill Quantity: 30,   # refills: 0    Last Office Visit with G primary care provider: 07/06/17    Future Office visit:     Controlled substance agreement on file: No.     Processing:  Fax Rx to [facility] pharmacy    PCP Dr. Vandana Jones

## 2017-10-05 DIAGNOSIS — I10 ESSENTIAL HYPERTENSION WITH GOAL BLOOD PRESSURE LESS THAN 140/90: ICD-10-CM

## 2017-10-05 RX ORDER — BENAZEPRIL HYDROCHLORIDE 40 MG/1
TABLET ORAL
Qty: 180 TABLET | Refills: 1 | Status: SHIPPED | OUTPATIENT
Start: 2017-10-05 | End: 2018-04-09

## 2017-10-09 DIAGNOSIS — I10 ESSENTIAL HYPERTENSION WITH GOAL BLOOD PRESSURE LESS THAN 140/90: ICD-10-CM

## 2017-10-09 NOTE — TELEPHONE ENCOUNTER
Norvasc      Last Written Prescription Date: 12/28/16  Last Fill Quantity: 90, # refills: 3    Last Office Visit with G, P or [facility] prescribing provider:  7/6/17   Future Office Visit:        BP Readings from Last 3 Encounters:   07/11/17 112/54   07/06/17 100/58   06/07/17 132/67

## 2017-10-11 RX ORDER — AMLODIPINE BESYLATE 5 MG/1
TABLET ORAL
Qty: 90 TABLET | Refills: 2 | Status: SHIPPED | OUTPATIENT
Start: 2017-10-11 | End: 2018-10-23

## 2017-10-22 ENCOUNTER — HEALTH MAINTENANCE LETTER (OUTPATIENT)
Age: 82
End: 2017-10-22

## 2017-11-09 DIAGNOSIS — F41.9 ANXIETY: ICD-10-CM

## 2017-11-09 NOTE — TELEPHONE ENCOUNTER
Lorazepam      Last Written Prescription Date: 8/29/17  Last Fill Quantity: 30,  # refills: 0   Last Office Visit with [facility], P or [facility] prescribing provider: 7/6/17                                         Next 5 appointments (look out 90 days)     Nov 20, 2017  1:30 PM CST   (Arrive by 1:15 PM)   PHYSICAL with Vandana Jones MD   [facility] [place] ([facility] )    [address] [place] [address]   [phone]

## 2017-11-10 RX ORDER — LORAZEPAM 0.5 MG/1
0.5 TABLET ORAL EVERY 8 HOURS PRN
Qty: 30 TABLET | Refills: 0 | Status: SHIPPED | OUTPATIENT
Start: 2017-11-10 | End: 2018-01-11

## 2017-11-20 ENCOUNTER — OFFICE VISIT (OUTPATIENT)
Dept: FAMILY MEDICINE | Facility: OTHER | Age: 82
End: 2017-11-20
Attending: FAMILY MEDICINE
Payer: COMMERCIAL

## 2017-11-20 VITALS
TEMPERATURE: 97.7 F | HEIGHT: 60 IN | OXYGEN SATURATION: 97 % | SYSTOLIC BLOOD PRESSURE: 130 MMHG | HEART RATE: 60 BPM | DIASTOLIC BLOOD PRESSURE: 60 MMHG | WEIGHT: 118 LBS | BODY MASS INDEX: 23.16 KG/M2

## 2017-11-20 DIAGNOSIS — M25.511 CHRONIC RIGHT SHOULDER PAIN: ICD-10-CM

## 2017-11-20 DIAGNOSIS — Z00.00 ROUTINE GENERAL MEDICAL EXAMINATION AT A HEALTH CARE FACILITY: Primary | ICD-10-CM

## 2017-11-20 DIAGNOSIS — G89.29 CHRONIC RIGHT SHOULDER PAIN: ICD-10-CM

## 2017-11-20 DIAGNOSIS — Z23 NEED FOR PROPHYLACTIC VACCINATION AND INOCULATION AGAINST INFLUENZA: ICD-10-CM

## 2017-11-20 DIAGNOSIS — I25.10 ASCVD (ARTERIOSCLEROTIC CARDIOVASCULAR DISEASE): ICD-10-CM

## 2017-11-20 DIAGNOSIS — J84.10 PULMONARY FIBROSIS (H): ICD-10-CM

## 2017-11-20 DIAGNOSIS — D69.6 THROMBOCYTOPENIA (H): ICD-10-CM

## 2017-11-20 DIAGNOSIS — I10 BENIGN ESSENTIAL HYPERTENSION: ICD-10-CM

## 2017-11-20 LAB
ALBUMIN SERPL-MCNC: 3.9 G/DL (ref 3.4–5)
ALP SERPL-CCNC: 53 U/L (ref 40–150)
ALT SERPL W P-5'-P-CCNC: 31 U/L (ref 0–50)
ANION GAP SERPL CALCULATED.3IONS-SCNC: 10 MMOL/L (ref 3–14)
AST SERPL W P-5'-P-CCNC: 25 U/L (ref 0–45)
BILIRUB SERPL-MCNC: 0.2 MG/DL (ref 0.2–1.3)
BUN SERPL-MCNC: 47 MG/DL (ref 7–30)
CALCIUM SERPL-MCNC: 9.3 MG/DL (ref 8.5–10.1)
CHLORIDE SERPL-SCNC: 109 MMOL/L (ref 94–109)
CO2 SERPL-SCNC: 23 MMOL/L (ref 20–32)
CREAT SERPL-MCNC: 1.4 MG/DL (ref 0.52–1.04)
ERYTHROCYTE [DISTWIDTH] IN BLOOD BY AUTOMATED COUNT: 13.8 % (ref 10–15)
GFR SERPL CREATININE-BSD FRML MDRD: 35 ML/MIN/1.7M2
GLUCOSE SERPL-MCNC: 94 MG/DL (ref 70–99)
HCT VFR BLD AUTO: 34.2 % (ref 35–47)
HGB BLD-MCNC: 11.7 G/DL (ref 11.7–15.7)
MCH RBC QN AUTO: 29.8 PG (ref 26.5–33)
MCHC RBC AUTO-ENTMCNC: 34.2 G/DL (ref 31.5–36.5)
MCV RBC AUTO: 87 FL (ref 78–100)
PLATELET # BLD AUTO: 246 10E9/L (ref 150–450)
POTASSIUM SERPL-SCNC: 4.5 MMOL/L (ref 3.4–5.3)
PROT SERPL-MCNC: 7.2 G/DL (ref 6.8–8.8)
RBC # BLD AUTO: 3.92 10E12/L (ref 3.8–5.2)
SODIUM SERPL-SCNC: 142 MMOL/L (ref 133–144)
WBC # BLD AUTO: 8 10E9/L (ref 4–11)

## 2017-11-20 PROCEDURE — 99397 PER PM REEVAL EST PAT 65+ YR: CPT | Performed by: FAMILY MEDICINE

## 2017-11-20 PROCEDURE — 36415 COLL VENOUS BLD VENIPUNCTURE: CPT | Mod: ZL | Performed by: FAMILY MEDICINE

## 2017-11-20 PROCEDURE — 80053 COMPREHEN METABOLIC PANEL: CPT | Mod: ZL | Performed by: FAMILY MEDICINE

## 2017-11-20 PROCEDURE — 85027 COMPLETE CBC AUTOMATED: CPT | Mod: ZL | Performed by: FAMILY MEDICINE

## 2017-11-20 PROCEDURE — 90662 IIV NO PRSV INCREASED AG IM: CPT | Performed by: FAMILY MEDICINE

## 2017-11-20 PROCEDURE — 90471 IMMUNIZATION ADMIN: CPT | Performed by: FAMILY MEDICINE

## 2017-11-20 ASSESSMENT — ANXIETY QUESTIONNAIRES
GAD7 TOTAL SCORE: 0
1. FEELING NERVOUS, ANXIOUS, OR ON EDGE: NOT AT ALL
IF YOU CHECKED OFF ANY PROBLEMS ON THIS QUESTIONNAIRE, HOW DIFFICULT HAVE THESE PROBLEMS MADE IT FOR YOU TO DO YOUR WORK, TAKE CARE OF THINGS AT HOME, OR GET ALONG WITH OTHER PEOPLE: NOT DIFFICULT AT ALL
3. WORRYING TOO MUCH ABOUT DIFFERENT THINGS: NOT AT ALL
2. NOT BEING ABLE TO STOP OR CONTROL WORRYING: NOT AT ALL
6. BECOMING EASILY ANNOYED OR IRRITABLE: NOT AT ALL
7. FEELING AFRAID AS IF SOMETHING AWFUL MIGHT HAPPEN: NOT AT ALL
5. BEING SO RESTLESS THAT IT IS HARD TO SIT STILL: NOT AT ALL

## 2017-11-20 ASSESSMENT — PATIENT HEALTH QUESTIONNAIRE - PHQ9
5. POOR APPETITE OR OVEREATING: NOT AT ALL
SUM OF ALL RESPONSES TO PHQ QUESTIONS 1-9: 1

## 2017-11-20 ASSESSMENT — PAIN SCALES - GENERAL: PAINLEVEL: NO PAIN (0)

## 2017-11-20 NOTE — PROGRESS NOTES
SUBJECTIVE:   CC: Lauren Barron is an 89 year old woman who presents for preventive health visit.     Healthy Habits:    Do you get at least three servings of calcium containing foods daily (dairy, green leafy vegetables, etc.)? yes    Amount of exercise or daily activities, outside of work: Stays busy all day- up and down stairs, walking to mailbox to get mail    Problems taking medications regularly No    Medication side effects: No    Have you had an eye exam in the past two years? no    Do you see a dentist twice per year? no    Do you have sleep apnea, excessive snoring or daytime drowsiness?no        Today's PHQ-2 Score: PHQ-2 ( 1999 Pfizer) 6/14/2016 9/21/2015   Q1: Little interest or pleasure in doing things 0 0   Q2: Feeling down, depressed or hopeless 0 0   PHQ-2 Score 0 0     Abuse: Current or Past(Physical, Sexual or Emotional)- No  Do you feel safe in your environment - Yes    Social History   Substance Use Topics     Smoking status: Never Smoker     Smokeless tobacco: Never Used     Alcohol use No     The patient does not drink >3 drinks per day nor >7 drinks per week.    Reviewed orders with patient.  Reviewed health maintenance and updated orders accordingly - Yes  BP Readings from Last 3 Encounters:   11/20/17 130/60   07/11/17 112/54   07/06/17 100/58    Wt Readings from Last 3 Encounters:   11/20/17 118 lb (53.5 kg)   07/11/17 125 lb (56.7 kg)   07/06/17 125 lb (56.7 kg)                  Patient Active Problem List   Diagnosis     Epistaxis, recurrent     Advance Care Planning     Hyperlipidemia     Essential hypertension     Transient global amnesia     GI bleed     ASCVD (arteriosclerotic cardiovascular disease)     Squamous cell skin cancer, ala nasi     Allergic rhinitis     Meniere's disease     Diastolic dysfunction     Atherosclerosis of aorta (H)     Cystocele     Mammogram declined     Bradycardia     Aortic insufficiency     Mitral regurgitation     Osteoarthritis     Sick sinus  syndrome (H)     Acute posthemorrhagic anemia     Coronary arteriosclerosis in native artery     Epistaxis     Gastrointestinal hemorrhage     Multiple-type hyperlipidemia     Presence of cardiac pacemaker     Acute respiratory failure (H)     Thrombocytopenia (H)     Hiatal hernia     Splenic artery aneurysm (H)     Pulmonary fibrosis (H)     Benign essential hypertension     Pulmonary nodules     Past Surgical History:   Procedure Laterality Date     APPENDECTOMY       ARTHROSCOPY KNEE      RIGHT - Osteoarthritis     C TOTAL KNEE ARTHROPLASTY      LEFT - Osteoarthritis - Ram[facility]rez Ruvalcaba     C TOTAL KNEE ARTHROPLASTY      RIGHT - Osteoarthritis - Ramírez Ruvalcaba     CATARACT EXTRACTION and LENS IMPLANTATION      BILATERAL     COLONOSCOPY  2012     Colonoscopy with Polypectomy  2012    GI BLEED - DR. DEL CASTILLO             REMOVAL OF OVARIAN CYST(S)       HYSTERECTOMY, YANG      Metorrhagia     IMPLANT PACEMAKER  2016     Left Subclavian Line, Triple Lumen      Gastric Ulcer, Dieulfoy lesion, hemostatic clips placed - Massive GI Bleed - [facility]     RELEASE CARPAL TUNNEL      RIGHT - Carpal Tunnel Syndrome     REPAIR BLADDER         Social History   Substance Use Topics     Smoking status: Never Smoker     Smokeless tobacco: Never Used     Alcohol use No     Family History   Problem Relation Age of Onset     CANCER Brother      Pancreatic     CANCER Brother      Prostate     CANCER Sister      Breast     Hypertension Brother      Hypertension Father      Hypertension Sister      Hypertension Mother      Aneurysm Daughter 68     cerebral, sudden death     OSTEOPOROSIS Other      Thyroid Disease No family hx of      DIABETES No family hx of          Current Outpatient Prescriptions   Medication Sig Dispense Refill     LORazepam (ATIVAN) 0.5 MG tablet Take 1 tablet (0.5 mg) by mouth every 8 hours as needed for anxiety 30 tablet 0     amLODIPine (NORVASC) 5 MG tablet  TAKE 1 TABLET DAILY. 90 tablet 2     benazepril (LOTENSIN) 40 MG tablet TAKE  (1)  TABLET TWICE A DAY. 180 tablet 1     triamterene-hydrochlorothiazide (DYAZIDE) 37.5-25 MG per capsule TAKE 1 CAPSULE DAILY. 90 capsule 1     isosorbide mononitrate (IMDUR) 30 MG 24 hr tablet TAKE ONE TABLET AT BEDTIME. 90 tablet 3     nitroglycerin (NITROSTAT) 0.4 MG sublingual tablet Place 1 tablet (0.4mg) by sublingual route at the first sign of attack; may repeat ever 5 min until relief; if pain persists after 3 tablets in 15 minutes prompt medical attention is recommended. AS NEEDED 25 tablet 3     STATIN NOT PRESCRIBED, INTENTIONAL, Statin not prescribed intentionally due to Other patient declines (This option does not exclude patient from measure) 0 each 0     calcium 1500 MG TABS Take 1 tablet by mouth daily 100 tablet 3     acetaminophen (TYLENOL) 500 MG tablet Take 2 tablet (1000mg) by oral route every 6 hours as needed 100 tablet 2     Cholecalciferol (VITAMIN D) 1000 UNITS capsule Take 1 capsule by mouth daily 90 capsule 3     Multiple vitamin TABS Take 1 tablet by oral route every day with foodTake 1 tablet by oral route every day with food 900 tablet 3     Allergies   Allergen Reactions     [place] Juice Other (See Comments), Nausea and GI Disturbance     Vertigo     Food      Oranges.     Naprosyn [Naproxen]      Incontinence       Niacin Swelling           Mammogram not appropriate for this patient based on age.    Pertinent mammograms are reviewed under the imaging tab.  History of abnormal Pap smear: NO - age 65 - see link Cervical Cytology Screening Guidelines      Reviewed and updated as needed this visit by clinical staffTobacco  Allergies  Meds  Med Hx  Surg Hx  Fam Hx  Soc Hx        Reviewed and updated as needed this visit by Provider        Past Medical History:   Diagnosis Date     Allergic rhinitis, cause unspecified 07/25/2011     Aneurysm of splenic artery (H) 03/31/2014    essentially normal for age,  heavily calcified, normal per Dr Connor, no further follow up of this needed.     Aortic insufficiency 2015    moderate, aortic sclerosis without stenosis echo 2015      ASCVD (arteriosclerotic cardiovascular disease) 2009    Bare metal stent obtuse margnial     Atherosclerosis of aorta (H) 2012    ECHO 2012     Cystocele 2012     Diastolic dysfunction 2012     GI bleed 2011    EGD-H Pylori, treated, Colonoscopy small Polyp - transfused 3 units of blood.     Hyperlipidemia 2003     Hypertension 1999     Mammogram declined 2012     Meniere's disease, unspecified 2011     Mitral regurgitation 2015    moderate, echo 2015      Osteoarthritis 2000     Sick sinus syndrome (H) 2016    dual chamber pacemaker placement     Squamous cell skin cancer, ala nasi 2006    MOHS Surgery     Transient global amnesia 2004      Past Surgical History:   Procedure Laterality Date     APPENDECTOMY       ARTHROSCOPY KNEE      RIGHT - Osteoarthritis     C TOTAL KNEE ARTHROPLASTY      LEFT - Osteoarthritis - [facility] Ruvalcaba     C TOTAL KNEE ARTHROPLASTY      RIGHT - Osteoarthritis - Ramírez Ruvalcaba     CATARACT EXTRACTION and LENS IMPLANTATION      BILATERAL     COLONOSCOPY       Colonoscopy with Polypectomy      GI BLEED - DR. DEL CASTILLO            HC REMOVAL OF OVARIAN CYST(S)       HYSTERECTOMY, YANG      Metorrhagia     IMPLANT PACEMAKER  2016     Left Subclavian Line, Triple Lumen      Gastric Ulcer, Dieulfoy lesion, hemostatic clips placed - Massive GI Bleed - Transferred to [facility]     RELEASE CARPAL TUNNEL      RIGHT - Carpal Tunnel Syndrome     REPAIR BLADDER       Obstetric History     No data available          ROS:  C: NEGATIVE for fever, chills, change in weight  I: NEGATIVE for worrisome rashes, moles or lesions  E: NEGATIVE for vision changes or irritation  ENT: NEGATIVE for ear, mouth and  throat problems  R: NEGATIVE for significant cough or SOB  B: NEGATIVE for masses, tenderness or discharge  CV: NEGATIVE for chest pain, palpitations or peripheral edema  GI: NEGATIVE for nausea, abdominal pain, heartburn, or change in bowel habits  : NEGATIVE for unusual urinary or vaginal symptoms. No vaginal bleeding.  M: NEGATIVE for significant arthralgias or myalgia  N: NEGATIVE for weakness, dizziness or paresthesias  E: NEGATIVE for temperature intolerance, skin/hair changes  H: NEGATIVE for bleeding problems  P: NEGATIVE for changes in mood or affect     OBJECTIVE:   /60  Pulse 60  Temp 97.7  F (36.5  C) (Tympanic)  Ht 5' (1.524 m)  Wt 118 lb (53.5 kg)  SpO2 97%  BMI 23.05 kg/m2  EXAM:  GENERAL APPEARANCE: healthy, alert and no distress  EYES: Eyes grossly normal to inspection, PERRL and conjunctivae and sclerae normal  HENT: ear canals and TM's normal, nose and mouth without ulcers or lesions, oropharynx clear and oral mucous membranes moist  NECK: no adenopathy, no asymmetry, masses, or scars and thyroid normal to palpation  RESP: lungs clear to auscultation - no rales, rhonchi or wheezes  BREAST: normal without masses, tenderness or nipple discharge and no palpable axillary masses or adenopathy  CV: regular rate and rhythm, normal S1 S2, no S3 or S4, no murmur, click or rub, no peripheral edema and peripheral pulses strong  ABDOMEN: soft, nontender, no hepatosplenomegaly, no masses and bowel sounds normal  MS: no musculoskeletal defects are noted and gait is age appropriate without ataxia. Right shoulder is nontender with decreased ROM in all modalities, unable to perform flexion beyond 40 degrees  SKIN: no suspicious lesions or rashes  NEURO: Normal strength and tone, sensory exam grossly normal, mentation intact and speech normal  PSYCH: mentation appears normal and affect normal/bright    ASSESSMENT/PLAN:   1. Routine general medical examination at a health care facility  No further  mammograms    2. Need for prophylactic vaccination and inoculation against influenza  given  - Vaccine Administration, Initial [73012]  - FLU VACCINE, INCREASED ANTIGEN, PRESV FREE    3. Benign essential hypertension  controlled  - CBC with platelets  - Comprehensive metabolic panel    4. Pulmonary fibrosis (H)  stable    5. ASCVD (arteriosclerotic cardiovascular disease)  Stable, asymptomatic    6. Thrombocytopenia (H)  Will check labs today    7. Chronic right shoulder pain  She will obtain an xray a different day as she is in a hurry today. Will refer for PT. She is wanting a sling but discussed that this would contribute to decreased ROM. She is not a surgical candidate and I suspect this is a rotator cuff tear  - PHYSICAL THERAPY REFERRAL    COUNSELING:   Reviewed preventive health counseling, as reflected in patient instructions       Regular exercise       Healthy diet/nutrition       Advance Care Planning         reports that she has never smoked. She has never used smokeless tobacco.    Estimated body mass index is 23.05 kg/(m^2) as calculated from the following:    Height as of this encounter: 5' (1.524 m).    Weight as of this encounter: 118 lb (53.5 kg).         Counseling Resources:  ATP IV Guidelines  Pooled Cohorts Equation Calculator  Breast Cancer Risk Calculator  FRAX Risk Assessment  ICSI Preventive Guidelines  Dietary Guidelines for Americans, 2010  USDA's MyPlate  ASA Prophylaxis  Lung CA Screening    Vandana Jones MD  [facility] [place] Influenza Immunization Documentation    1.  Is the person to be vaccinated sick today?   No    2. Does the person to be vaccinated have an allergy to a component   of the vaccine?   No  Egg Allergy Algorithm Link    3. Has the person to be vaccinated ever had a serious reaction   to influenza vaccine in the past?   No    4. Has the person to be vaccinated ever had Guillain-Barré syndrome?   No    Form completed by JOSEPHINE NICOLE LPN

## 2017-11-20 NOTE — NURSING NOTE
Chief Complaint   Patient presents with     Physical       Initial /60  Pulse 60  Temp 97.7  F (36.5  C) (Tympanic)  Ht 5' (1.524 m)  Wt 118 lb (53.5 kg)  SpO2 97%  BMI 23.05 kg/m2 Estimated body mass index is 23.05 kg/(m^2) as calculated from the following:    Height as of this encounter: 5' (1.524 m).    Weight as of this encounter: 118 lb (53.5 kg).  Medication Reconciliation: complete   JOSEPHINE NICOLE LPN

## 2017-11-20 NOTE — MR AVS SNAPSHOT
After Visit Summary   11/20/2017    Lauren Barron    MRN: [id number]           Patient Information     Date Of Birth          1/17/1926        Visit Information        Provider Department      11/20/2017 1:30 PM Vandana Jones MD [facility]        Today's Diagnoses     Routine general medical examination at a health care facility    -  1    Need for prophylactic vaccination and inoculation against influenza        Benign essential hypertension        Pulmonary fibrosis (H)        ASCVD (arteriosclerotic cardiovascular disease)        Thrombocytopenia (H)        Chronic right shoulder pain          Care Instructions      Preventive Health Recommendations  Female Ages 65 +    Yearly exam:     See your health care provider every year in order to  o Review health changes.   o Discuss preventive care.    o Review your medicines if your doctor has prescribed any.      You no longer need a yearly Pap test unless you've had an abnormal Pap test in the past 10 years. If you have vaginal symptoms, such as bleeding or discharge, be sure to talk with your provider about a Pap test.      Every 1 to 2 years, have a mammogram.  If you are over 69, talk with your health care provider about whether or not you want to continue having screening mammograms.      Every 10 years, have a colonoscopy. Or, have a yearly FIT test (stool test). These exams will check for colon cancer.       Have a cholesterol test every 5 years, or more often if your doctor advises it.       Have a diabetes test (fasting glucose) every three years. If you are at risk for diabetes, you should have this test more often.       At age 65, have a bone density scan (DEXA) to check for osteoporosis (brittle bone disease).    Shots:    Get a flu shot each year.    Get a tetanus shot every 10 years.    Talk to your doctor about your pneumonia vaccines. There are now two you should receive - Pneumovax (PPSV 23) and Prevnar (PCV  "13).    Talk to your doctor about the shingles vaccine.    Talk to your doctor about the hepatitis B vaccine.    Nutrition:     Eat at least 5 servings of fruits and vegetables each day.      Eat whole-grain bread, whole-wheat pasta and brown rice instead of white grains and rice.      Talk to your provider about Calcium and Vitamin D.     Lifestyle    Exercise at least 150 minutes a week (30 minutes a day, 5 days a week). This will help you control your weight and prevent disease.      Limit alcohol to one drink per day.      No smoking.       Wear sunscreen to prevent skin cancer.       See your dentist twice a year for an exam and cleaning.      See your eye doctor every 1 to 2 years to screen for conditions such as glaucoma, macular degeneration, cataracts, etc           Follow-ups after your visit        Additional Services     PHYSICAL THERAPY REFERRAL       *This therapy referral will be filtered to a centralized scheduling office at [facility] and the patient will receive a call to schedule an appointment at a [place] location most convenient for them. *     [facility] provides Physical Therapy evaluation and treatment and many specialty services across the [place] system.  If requesting a specialty program, please choose from the list below.    If you have not heard from the scheduling office within 2 business days, please call [phone] for all locations, with the exception of [place], please call [phone].  Treatment: Evaluation & Treatment  Special Instructions/Modalities: none  Special Programs: [place]    Please be aware that coverage of these services is subject to the terms and limitations of your health insurance plan.  Call member services at your health plan with any benefit or coverage questions.      **Note to Provider:  If you are referring outside of [place] for the therapy appointment, please list the name of the location in the \"special " "instructions\" above, print the referral and give to the patient to schedule the appointment.                  Follow-up notes from your care team     Return in about 6 months (around 2018) for BP Recheck.      Future tests that were ordered for you today     Open Future Orders        Priority Expected Expires Ordered    XR Shoulder Right 1 View Routine 2017            Who to contact     If you have questions or need follow up information about today's clinic visit or your schedule please contact [facility] directly at [phone].  Normal or non-critical lab and imaging results will be communicated to you by Visiarchart, letter or phone within 4 business days after the clinic has received the results. If you do not hear from us within 7 days, please contact the clinic through GoBeMet or phone. If you have a critical or abnormal lab result, we will notify you by phone as soon as possible.  Submit refill requests through WinView or call your pharmacy and they will forward the refill request to us. Please allow 3 business days for your refill to be completed.          Additional Information About Your Visit        WinView Information     WinView lets you send messages to your doctor, view your test results, renew your prescriptions, schedule appointments and more. To sign up, go to [email or web address] . Click on \"Log in\" on the left side of the screen, which will take you to the Welcome page. Then click on \"Sign up Now\" on the right side of the page.     You will be asked to enter the access code listed below, as well as some personal information. Please follow the directions to create your username and password.     Your access code is: [email or web address]  Expires: 2018  2:10 PM     Your access code will  in 90 days. If you need help or a new code, please call your [facility] or [phone].        Care EveryWhere ID     This is your Care EveryWhere ID. This " could be used by other organizations to access your [facility] medical records  [id number]        Your Vitals Were     Pulse Temperature Height Pulse Oximetry BMI (Body Mass Index)       60 97.7  F (36.5  C) (Tympanic) 5' (1.524 m) 97% 23.05 kg/m2        Blood Pressure from Last 3 Encounters:   11/20/17 130/60   07/11/17 112/54   07/06/17 100/58    Weight from Last 3 Encounters:   11/20/17 118 lb (53.5 kg)   07/11/17 125 lb (56.7 kg)   07/06/17 125 lb (56.7 kg)              We Performed the Following     CBC with platelets     Comprehensive metabolic panel     FLU VACCINE, INCREASED ANTIGEN, PRESV FREE     PHYSICAL THERAPY REFERRAL     Vaccine Administration, Initial [13914]        Primary Care Provider Office Phone # Fax #    Vandana Jones [phone]       [facility] [place] [address]  [place] [address]        Equal Access to Services     KEHINDE ORDONEZ : Hadii aad ku hadasho Soomaali, waaxda luqadaha, qaybta kaalmada adeegyada, waxay idiin hayaan daniel mares . So [facility] [phone].    ATENCIÓN: Si habla español, tiene a leon disposición servicios gratuitos de asistencia lingüística. Llame al [phone].    We comply with applicable federal civil rights laws and [place] laws. We do not discriminate on the basis of race, color, national origin, age, disability, sex, sexual orientation, or gender identity.            Thank you!     Thank you for choosing [facility] [place]  for your care. Our goal is always to provide you with excellent care. Hearing back from our patients is one way we can continue to improve our services. Please take a few minutes to complete the written survey that you may receive in the mail after your visit with us. Thank you!             Your Updated Medication List - Protect others around you: Learn how to safely use, store and throw away your medicines at www.disposemymeds.org.          This list is accurate as of: 11/20/17  2:10 PM.  Always use  your most recent med list.                   Brand Name Dispense Instructions for use Diagnosis    acetaminophen 500 MG tablet    TYLENOL    100 tablet    Take 2 tablet (1000mg) by oral route every 6 hours as needed    Other unknown and unspecified cause of morbidity or mortality       amLODIPine 5 MG tablet    NORVASC    90 tablet    TAKE 1 TABLET DAILY.    Essential hypertension with goal blood pressure less than 140/90       benazepril 40 MG tablet    LOTENSIN    180 tablet    TAKE  (1)  TABLET TWICE A DAY.    Essential hypertension with goal blood pressure less than 140/90       calcium 1500 MG Tabs     100 tablet    Take 1 tablet by mouth daily    Healthcare maintenance       isosorbide mononitrate 30 MG 24 hr tablet    IMDUR    90 tablet    TAKE ONE TABLET AT BEDTIME.    Chronic ischemic heart disease, unspecified       LORazepam 0.5 MG tablet    ATIVAN    30 tablet    Take 1 tablet (0.5 mg) by mouth every 8 hours as needed for anxiety    Anxiety       Multiple vitamin Tabs     900 tablet    Take 1 tablet by oral route every day with foodTake 1 tablet by oral route every day with food    Health care maintenance       nitroGLYcerin 0.4 MG sublingual tablet    NITROSTAT    25 tablet    Place 1 tablet (0.4mg) by sublingual route at the first sign of attack; may repeat ever 5 min until relief; if pain persists after 3 tablets in 15 minutes prompt medical attention is recommended. AS NEEDED    Chronic ischemic heart disease, unspecified       STATIN NOT PRESCRIBED (INTENTIONAL)     0 each    Statin not prescribed intentionally due to Other patient declines (This option does not exclude patient from measure)    Essential hypertension with goal blood pressure less than 140/90       triamterene-hydrochlorothiazide 37.5-25 MG per capsule    DYAZIDE    90 capsule    TAKE 1 CAPSULE DAILY.    Benign essential hypertension       vitamin D 1000 UNITS capsule     90 capsule    Take 1 capsule by mouth daily    Health care  maintenance

## 2017-11-21 ASSESSMENT — ANXIETY QUESTIONNAIRES: GAD7 TOTAL SCORE: 0

## 2017-12-11 ENCOUNTER — HOSPITAL ENCOUNTER (OUTPATIENT)
Dept: PHYSICAL THERAPY | Facility: HOSPITAL | Age: 82
Setting detail: THERAPIES SERIES
End: 2017-12-11
Attending: FAMILY MEDICINE
Payer: COMMERCIAL

## 2017-12-11 PROCEDURE — 97140 MANUAL THERAPY 1/> REGIONS: CPT | Mod: GP | Performed by: PHYSICAL THERAPIST

## 2017-12-11 PROCEDURE — G8984 CARRY CURRENT STATUS: HCPCS | Mod: GP,CL | Performed by: PHYSICAL THERAPIST

## 2017-12-11 PROCEDURE — 97110 THERAPEUTIC EXERCISES: CPT | Mod: GP | Performed by: PHYSICAL THERAPIST

## 2017-12-11 PROCEDURE — 97161 PT EVAL LOW COMPLEX 20 MIN: CPT | Mod: GP | Performed by: PHYSICAL THERAPIST

## 2017-12-11 PROCEDURE — G8985 CARRY GOAL STATUS: HCPCS | Mod: GP,CI | Performed by: PHYSICAL THERAPIST

## 2017-12-11 NOTE — PROGRESS NOTES
12/11/17 1500   General Information   Type of Visit Initial OP Ortho PT Evaluation   Start of Care Date 12/11/17   Referring Physician Dr ISAURA Jones   Patient/Family Goals Statement less pain, better function of R shoulder   Orders Evaluate and Treat   Date of Order 11/20/17   Insurance Type [employer]   Insurance Comments/Visits Authorized Submitted for up to 12 visits   Medical Diagnosis R shoulder pain, RC tendinopathy/small tearing   Surgical/Medical history reviewed Yes   Precautions/Limitations no known precautions/limitations   Body Part(s)   Body Part(s) Shoulder   Presentation and Etiology   Pertinent history of current problem (include personal factors and/or comorbidities that impact the POC) Patient reports she fell leaving [employer] 20 years ago and her arm caught on the railing. SHe had an xray and that was normal. She did not have much pain or trouble going forward until a couple of months ago she started noticing aching in her R shoulder and she has started to lose ROM. She has difficulty raising her arm up overhead to do her hair and she cannot reach behind her. She wants to work on anything she can do to get as much use out of her R shoulder as she can   Impairments A. Pain;E. Decreased flexibility;F. Decreased strength and endurance;L. Tingling   Functional Limitations perform activities of daily living   Symptom Location R shoulder superior/lateral   How/Where did it occur With a fall   Onset date of current episode/exacerbation 11/20/17   Chronicity Chronic   Pain rating (0-10 point scale) Best (/10);Worst (/10)   Best (/10) 0   Worst (/10) 7   Pain quality B. Dull;C. Aching   Frequency of pain/symptoms B. Intermittent   Pain/symptoms are: Worse during the day   Pain/symptoms exacerbated by C. Lifting;D. Carrying;H. Overhead reach;K. Home tasks   Pain/symptoms eased by C. Rest   Progression of symptoms since onset: Unchanged   Current / Previous Interventions   Diagnostic Tests: X-ray   X-ray Results  unremarkable   Current Level of Function   Patient role/employment history F. Retired   [facility]   Fall Risk Screen   Fall screen completed by PT   Have you fallen 2 or more times in the past year? No   Have you fallen and had an injury in the past year? No   Is patient a fall risk? No   Shoulder Objective Findings   Side (if bilateral, select both right and left) Right   Observation no acute distress   Integumentary  WNL'   Posture Rounded shoulders, increased kyphosis   Cervical Screen (ROM, quadrant) WNL   Shoulder ROM Comment Limited  motion by pain, slight capsular pattern appreicated, but likely not full thickness RC tear'   Scapulothoracic Rhythm hypo with elevation of shoulder with early scap rise and UT firing   Pec Minor (supine) Flexibility hypo   Neer's Test +   Banuelos-Primitivo Test +   Bursa Test +   Shoulder Special Tests Comments - drop arm, - labral   Palpation TTP LH bicep tendon, supra and infra tendons   Accessory Motion/Joint Mobility hypo inferior and posterior glides R shoulder   Right Shoulder Flexion AROM 118   Right Shoulder Flexion PROM full   Right Shoulder Abduction AROM 120   Right Shoulder Abduction PROM full   Right Shoulder ER AROM 30   Right Shoulder ER PROM 38   Right Shoulder IR AROM Beltline   Right Shoulder Flexion Strength 5-/5 pain   Right Shoulder Abduction Strength 5-/5 pain   Right Shoulder ER Strength 4-/5   Right Shoulder IR Strength 5/5   Planned Therapy Interventions   Planned Therapy Interventions joint mobilization;manual therapy;neuromuscular re-education;ROM;strengthening;stretching   Planned Modality Interventions   Planned Modality Interventions Comments as needed   Clinical Impression   Criteria for Skilled Therapeutic Interventions Met yes, treatment indicated   PT Diagnosis R shoulder pain, likely small RC tearing infra/supra vs bursitis   Influenced by the following impairments pain and weakness   Functional limitations due to  impairments difficulty performing daily and self-care activities   Clinical Presentation Stable/Uncomplicated   Clinical Presentation Rationale due to lack of additional comorbidities   Clinical Decision Making (Complexity) Low complexity   Therapy Frequency 2 times/Week   Predicted Duration of Therapy Intervention (days/wks) up to 6 weeks   Risk & Benefits of therapy have been explained Yes   Patient, Family & other staff in agreement with plan of care Yes   Clinical Impression Comments Presentation is consistent with R shoulder pain associated with RC dysfunction vs bursitis that is expected to improve with skilled PT intervention   Education Assessment   Preferred Learning Style Demonstration   Barriers to Learning No barriers   ORTHO GOALS   PT Ortho Eval Goals 1;2;3   Ortho Goal 1   Goal Identifier STG 1   Goal Description Patient will report decreased worst PL in R shoulder to 4/10 or less in order to perform daily activities   Target Date 01/01/18   Ortho Goal 2   Goal Identifier LTG 1   Goal Description Patient will demonstrate increased R shoulder ABD to 150 in order to do self care activtiies   Target Date 01/22/18   Ortho Goal 3   Goal Identifier LTG 2   Goal Description Patient will report overall 70% improvement in her R shoulder function in order to perform daily and self cares   Target Date 01/22/18   Total Evaluation Time   Total Evaluation Time 22

## 2017-12-18 ENCOUNTER — HOSPITAL ENCOUNTER (OUTPATIENT)
Dept: PHYSICAL THERAPY | Facility: HOSPITAL | Age: 82
Setting detail: THERAPIES SERIES
End: 2017-12-18
Attending: FAMILY MEDICINE
Payer: COMMERCIAL

## 2017-12-18 PROCEDURE — 97110 THERAPEUTIC EXERCISES: CPT | Mod: GP

## 2017-12-18 PROCEDURE — 97140 MANUAL THERAPY 1/> REGIONS: CPT | Mod: GP

## 2017-12-18 PROCEDURE — 40000718 ZZHC STATISTIC PT DEPARTMENT ORTHO VISIT

## 2017-12-22 ENCOUNTER — HOSPITAL ENCOUNTER (OUTPATIENT)
Dept: PHYSICAL THERAPY | Facility: HOSPITAL | Age: 82
Setting detail: THERAPIES SERIES
End: 2017-12-22
Attending: FAMILY MEDICINE
Payer: COMMERCIAL

## 2017-12-22 PROCEDURE — 97140 MANUAL THERAPY 1/> REGIONS: CPT | Mod: GP | Performed by: PHYSICAL THERAPIST

## 2017-12-22 PROCEDURE — 97035 APP MDLTY 1+ULTRASOUND EA 15: CPT | Mod: GP | Performed by: PHYSICAL THERAPIST

## 2017-12-27 ENCOUNTER — HOSPITAL ENCOUNTER (OUTPATIENT)
Dept: PHYSICAL THERAPY | Facility: HOSPITAL | Age: 82
Setting detail: THERAPIES SERIES
End: 2017-12-27
Attending: FAMILY MEDICINE
Payer: COMMERCIAL

## 2017-12-27 PROCEDURE — 97035 APP MDLTY 1+ULTRASOUND EA 15: CPT | Mod: GP

## 2017-12-27 PROCEDURE — 40000718 ZZHC STATISTIC PT DEPARTMENT ORTHO VISIT

## 2017-12-27 PROCEDURE — 97140 MANUAL THERAPY 1/> REGIONS: CPT | Mod: GP

## 2018-01-02 ENCOUNTER — HOSPITAL ENCOUNTER (OUTPATIENT)
Dept: PHYSICAL THERAPY | Facility: HOSPITAL | Age: 83
Setting detail: THERAPIES SERIES
End: 2018-01-02
Attending: FAMILY MEDICINE
Payer: COMMERCIAL

## 2018-01-02 PROCEDURE — 97140 MANUAL THERAPY 1/> REGIONS: CPT | Mod: GP

## 2018-01-02 PROCEDURE — 40000718 ZZHC STATISTIC PT DEPARTMENT ORTHO VISIT

## 2018-01-02 PROCEDURE — 97035 APP MDLTY 1+ULTRASOUND EA 15: CPT | Mod: GP

## 2018-01-04 ENCOUNTER — HOSPITAL ENCOUNTER (OUTPATIENT)
Dept: PHYSICAL THERAPY | Facility: HOSPITAL | Age: 83
Setting detail: THERAPIES SERIES
End: 2018-01-04
Attending: FAMILY MEDICINE
Payer: COMMERCIAL

## 2018-01-04 PROCEDURE — 97140 MANUAL THERAPY 1/> REGIONS: CPT | Mod: GP | Performed by: PHYSICAL THERAPIST

## 2018-01-04 PROCEDURE — 97035 APP MDLTY 1+ULTRASOUND EA 15: CPT | Mod: GP | Performed by: PHYSICAL THERAPIST

## 2018-01-09 ENCOUNTER — HOSPITAL ENCOUNTER (OUTPATIENT)
Dept: PHYSICAL THERAPY | Facility: HOSPITAL | Age: 83
Setting detail: THERAPIES SERIES
End: 2018-01-09
Attending: FAMILY MEDICINE
Payer: COMMERCIAL

## 2018-01-09 PROCEDURE — G8985 CARRY GOAL STATUS: HCPCS | Mod: GP,CI | Performed by: PHYSICAL THERAPIST

## 2018-01-09 PROCEDURE — G8986 CARRY D/C STATUS: HCPCS | Mod: GP,CI | Performed by: PHYSICAL THERAPIST

## 2018-01-09 PROCEDURE — 97035 APP MDLTY 1+ULTRASOUND EA 15: CPT | Mod: GP | Performed by: PHYSICAL THERAPIST

## 2018-01-09 PROCEDURE — 97140 MANUAL THERAPY 1/> REGIONS: CPT | Mod: GP | Performed by: PHYSICAL THERAPIST

## 2018-01-09 NOTE — PROGRESS NOTES
Progress Report    Patient is making good progress and will work on her HEP independently for 2-3 weeks and then return to clinic for her progression.  Goals have been updated and a progress report generated.  Will email insurance and request for 5 additional visits to be used 1x/week tapering to every other week for up to 2 months.  Miriam Aviles DPT, YAMILKA       01/09/18 1200   Signing Clinician's Name / Credentials   Signing clinician's name / credentials Miriam Aviles DPT, OCS   Session Number   Session Number 7   Progress Note/Recertification   Progress Note Completed Date 01/09/18   Adult Goals   PT Ortho Eval Goals 1;2;3   Ortho Goal 1   Goal Description Patient will report decreased worst PL in R shoulder to 4/10 or less in order to perform daily activities   Target Date 01/01/18   Date Met 01/09/18   Ortho Goal 2   Goal Identifier LTG 1   Goal Description Patient will demonstrate increased R shoulder ABD to 150 in order to do self care activtiies   Target Date 02/06/18   Date Met (Improving)   Ortho Goal 3   Goal Identifier LTG 2   Goal Description Patient will report overall 70% improvement in her R shoulder function in order to perform daily and self cares   Target Date 02/06/18   Date Met (Improving - reports at least 50% today)   Subjective Report   Subjective Report Reports she continues to feel her shoulder is much better. She has been able to reach up and overhead with greater ease, she has less pain performing home tasks and she notes better mobility. She will continue her HEP on her own for 2-3 weeks and will return back to PT for a progression and additional treatment based on her symptoms   Objective Measures   Objective Measures Objective Measure 1   Objective Measure 1   Objective Measure Tissue mobility   Details Soft tissue throughout R UT, leavtor, rhomboid is less restricted in general. although there still is some increased tone and tissue restriction that is appreciable.  This  improves well with manual work and US.  Her ROM has improved by 50 degrees in ABD and by 30 in elevation. She is making good progress   Modalities   Modalities Ultrasound   Ultrasound   Minutes 8   Skilled Intervention continuous US   Patient Response good - less pain   Treatment Detail R shoulder continuous 2.0 w/cm2, 1MHz R UT, levator area in sitting   Manual Therapy   Minutes 20   Skilled Intervention STM and MFR   Patient Response good - less pain and improved motion   Treatment Detail R UT, levator, supra in sitting with MFR and STM then ROM stretching to following   Progress Improved tissue mobility, much less pain reported   Assessments Completed   Assessments Completed Patient is making great progress and will work IND for 2-3 weeks and we will re-assess her symptoms   Plan   Home program Continue HEP   Plan for next session Will follow up in 2-3 weeks   Total Session Time   Timed Code Treatment Minutes 28   Total Treatment Time (sum of timed and untimed services) 28

## 2018-01-11 DIAGNOSIS — F41.9 ANXIETY: ICD-10-CM

## 2018-01-11 RX ORDER — LORAZEPAM 0.5 MG/1
0.5 TABLET ORAL EVERY 8 HOURS PRN
Qty: 30 TABLET | Refills: 0 | Status: SHIPPED | OUTPATIENT
Start: 2018-01-11 | End: 2018-04-10

## 2018-01-11 NOTE — TELEPHONE ENCOUNTER
lorazepam      Last Written Prescription Date:  11/10/17  Last Fill Quantity: 30,   # refills: 0  Last Office Visit: 11/20/17  Future Office visit:       Routing refill request to provider for review/approval because:  Drug not on the G, P or [facility] refill protocol or controlled substance

## 2018-01-25 ENCOUNTER — TELEPHONE (OUTPATIENT)
Dept: FAMILY MEDICINE | Facility: OTHER | Age: 83
End: 2018-01-25

## 2018-01-25 DIAGNOSIS — J84.10 PULMONARY FIBROSIS (H): Primary | ICD-10-CM

## 2018-01-25 NOTE — TELEPHONE ENCOUNTER
12:58 PM    Reason for Call: Phone Call    Description: Pts daughter called and states that she is having a hard time breathing and would like to see if she should get some prednisone     Was an appointment offered for this call? No  If yes : Appointment type              Date    Preferred method for responding to this message: Telephone Call  What is your phone number ?    If we cannot reach you directly, may we leave a detailed response at the number you provided? Yes    Can this message wait until your PCP/provider returns, if available today? Not applicable, PCP I bobo Knight

## 2018-01-26 RX ORDER — PREDNISONE 10 MG/1
10 TABLET ORAL DAILY
Qty: 10 TABLET | Refills: 0 | Status: SHIPPED | OUTPATIENT
Start: 2018-01-26 | End: 2018-02-19

## 2018-01-26 NOTE — TELEPHONE ENCOUNTER
Please see note below and advise, dc medications patient was put on prednisone 10mg daily for long time.  Dx pulmonary fibrosis. Medication was DC in November.

## 2018-01-26 NOTE — TELEPHONE ENCOUNTER
Rajani (daughter) called back and stated pt does not have any cold symptoms. She has had these breathing  issues on and off when walking. Pt just gets winded easily. Pt has been put on prednisone for this before. She would like call back today if possible. Please call her back at [phone]

## 2018-02-19 ENCOUNTER — TELEPHONE (OUTPATIENT)
Dept: FAMILY MEDICINE | Facility: OTHER | Age: 83
End: 2018-02-19

## 2018-02-19 DIAGNOSIS — J84.10 PULMONARY FIBROSIS (H): ICD-10-CM

## 2018-02-19 RX ORDER — PREDNISONE 10 MG/1
10 TABLET ORAL DAILY
Qty: 30 TABLET | Refills: 0 | Status: SHIPPED | OUTPATIENT
Start: 2018-02-19 | End: 2018-06-14

## 2018-02-19 NOTE — TELEPHONE ENCOUNTER
Reason for call:  Medication    1. Medication Name? Prednisone  2. Is this request for a refill? Yes  3. What Pharmacy do you use? [facility]  4. Have you contacted your pharmacy? No    5. If yes, when?  (Please note that the turn-around-time for prescriptions is 72 business hours; I am sending your request at this time. SEND TO  [facility]  )  Description: Pt stated the last time she had these filled was through Dr RICHARD Jones. Please call pt back at [phone]  Was an appointment offered for this a call? No   Preferred method for responding to this messageTelephone Call  If we cannot reach you directly, may we leave a detailed response at the number you provided? Yes  Can this message wait until your PCP/Provider returns if not available today? Not applicable

## 2018-04-09 DIAGNOSIS — I25.9 CHRONIC ISCHEMIC HEART DISEASE: ICD-10-CM

## 2018-04-09 DIAGNOSIS — I10 ESSENTIAL HYPERTENSION WITH GOAL BLOOD PRESSURE LESS THAN 140/90: ICD-10-CM

## 2018-04-10 DIAGNOSIS — F41.9 ANXIETY: ICD-10-CM

## 2018-04-10 RX ORDER — LORAZEPAM 0.5 MG/1
0.5 TABLET ORAL EVERY 8 HOURS PRN
Qty: 30 TABLET | Refills: 0 | Status: SHIPPED | OUTPATIENT
Start: 2018-04-10 | End: 2018-06-14

## 2018-04-10 RX ORDER — ISOSORBIDE MONONITRATE 30 MG/1
TABLET, EXTENDED RELEASE ORAL
Qty: 90 TABLET | Refills: 1 | Status: SHIPPED | OUTPATIENT
Start: 2018-04-10 | End: 2019-05-21

## 2018-04-10 RX ORDER — BENAZEPRIL HYDROCHLORIDE 40 MG/1
TABLET ORAL
Qty: 180 TABLET | Refills: 1 | Status: SHIPPED | OUTPATIENT
Start: 2018-04-10 | End: 2019-05-21

## 2018-04-10 NOTE — TELEPHONE ENCOUNTER
ativan      Last Written Prescription Date:1/11/18    Last Fill Quantity:30,   # refills: 0  Last Office Visit: 11/20/17  Future Office visit:       Routing refill request to provider for review/approval because:  Drug not on the [facility], P or [facility] refill protocol or controlled substance

## 2018-04-18 ENCOUNTER — TRANSFERRED RECORDS (OUTPATIENT)
Dept: HEALTH INFORMATION MANAGEMENT | Facility: CLINIC | Age: 83
End: 2018-04-18

## 2018-05-18 ENCOUNTER — TELEPHONE (OUTPATIENT)
Dept: FAMILY MEDICINE | Facility: OTHER | Age: 83
End: 2018-05-18

## 2018-05-18 NOTE — TELEPHONE ENCOUNTER
1:31 PM    Reason for Call: OVERBOOK    Patient is having the following symptoms: stomach pain for 2 months.    The patient is requesting an appointment for ASAP with Dr.Susan Jones.    Was an appointment offered for this call? Yes  If yes : Appointment type short               Date: 06/07/18, does not want to wait this long     Preferred method for responding to this message: Telephone Call  What is your phone number ?[phone]    If we cannot reach you directly, may we leave a detailed response at the number you provided? Yes    Can this message wait until your PCP/provider returns, if unavailable today? YES, PCP is out     Ana Knight

## 2018-05-21 NOTE — TELEPHONE ENCOUNTER
Please schedule patient for date/time: can see this week on Thursday at 9:30, arrival time 9:15    Have patient go to ER/Urgent Care Center. Urgent Care hours are 9:30 am to 8 pm, open 7 days a week. No.    Provider will call patient.No.    Other:

## 2018-05-24 ENCOUNTER — OFFICE VISIT (OUTPATIENT)
Dept: FAMILY MEDICINE | Facility: OTHER | Age: 83
End: 2018-05-24
Attending: FAMILY MEDICINE
Payer: COMMERCIAL

## 2018-05-24 VITALS
SYSTOLIC BLOOD PRESSURE: 114 MMHG | HEIGHT: 60 IN | HEART RATE: 58 BPM | DIASTOLIC BLOOD PRESSURE: 68 MMHG | RESPIRATION RATE: 19 BRPM | BODY MASS INDEX: 22.58 KG/M2 | OXYGEN SATURATION: 100 % | WEIGHT: 115 LBS | TEMPERATURE: 96.7 F

## 2018-05-24 DIAGNOSIS — R10.13 ABDOMINAL PAIN, EPIGASTRIC: Primary | ICD-10-CM

## 2018-05-24 DIAGNOSIS — J84.10 PULMONARY FIBROSIS (H): ICD-10-CM

## 2018-05-24 DIAGNOSIS — I25.10 CORONARY ARTERIOSCLEROSIS IN NATIVE ARTERY: ICD-10-CM

## 2018-05-24 DIAGNOSIS — I72.8 SPLENIC ARTERY ANEURYSM (H): ICD-10-CM

## 2018-05-24 DIAGNOSIS — R91.8 PULMONARY NODULES: ICD-10-CM

## 2018-05-24 PROCEDURE — G0463 HOSPITAL OUTPT CLINIC VISIT: HCPCS

## 2018-05-24 PROCEDURE — 99214 OFFICE O/P EST MOD 30 MIN: CPT | Performed by: FAMILY MEDICINE

## 2018-05-24 ASSESSMENT — ANXIETY QUESTIONNAIRES
GAD7 TOTAL SCORE: 0
3. WORRYING TOO MUCH ABOUT DIFFERENT THINGS: NOT AT ALL
1. FEELING NERVOUS, ANXIOUS, OR ON EDGE: NOT AT ALL
2. NOT BEING ABLE TO STOP OR CONTROL WORRYING: NOT AT ALL
5. BEING SO RESTLESS THAT IT IS HARD TO SIT STILL: NOT AT ALL
6. BECOMING EASILY ANNOYED OR IRRITABLE: NOT AT ALL
4. TROUBLE RELAXING: NOT AT ALL
7. FEELING AFRAID AS IF SOMETHING AWFUL MIGHT HAPPEN: NOT AT ALL
IF YOU CHECKED OFF ANY PROBLEMS ON THIS QUESTIONNAIRE, HOW DIFFICULT HAVE THESE PROBLEMS MADE IT FOR YOU TO DO YOUR WORK, TAKE CARE OF THINGS AT HOME, OR GET ALONG WITH OTHER PEOPLE: NOT DIFFICULT AT ALL

## 2018-05-24 ASSESSMENT — PAIN SCALES - GENERAL: PAINLEVEL: NO PAIN (0)

## 2018-05-24 NOTE — MR AVS SNAPSHOT
After Visit Summary   5/24/2018    Lauren Barron    MRN: [id number]           Patient Information     Date Of Birth          1/17/1926        Visit Information        Provider Department      5/24/2018 9:30 AM [facility]        Today's Diagnoses     Abdominal pain, epigastric    -  1       Follow-ups after your visit        Additional Services     CARDIOLOGY EVAL ADULT REFERRAL       Preferred location:  Dr Mckeon, to evaluate ongoing epigastric pain, rule out angina    Please be aware that coverage of these services is subject to the terms and limitations of your health insurance plan.  Call member services at your health plan with any benefit or coverage questions.      Please bring the following to your appointment:  Any x-rays, CTs or MRIs which have been performed. Contact the facility where they were done to arrange for  prior to your scheduled appointment.    List of current medications  This referral request   Any documents/labs given to you for this referral                  Follow-up notes from your care team     Return in about 4 weeks (around 6/21/2018) for recheck abdominal pain.      Your next 10 appointments already scheduled     Kevin 15, 2018  3:30 PM CDT   (Arrive by 3:15 PM)   New Visit with Michael Mckeon,    [facility] [place] ([facility] )    [address] [id number] [phone]              Who to contact     If you have questions or need follow up information about today's clinic visit or your schedule please contact [facility] directly at [phone].  Normal or non-critical lab and imaging results will be communicated to you by MyChart, letter or phone within 4 business days after the clinic has received the results. If you do not hear from us within 7 days, please contact the clinic through MyChart or phone. If you have a critical or abnormal lab result, we will notify you  by phone as soon as possible.  Submit refill requests through [employer] or call your pharmacy and they will forward the refill request to us. Please allow 3 business days for your refill to be completed.          Additional Information About Your Visit        Care EveryWhere ID     This is your Care EveryWhere ID. This could be used by other organizations to access your [facility] medical records  [id number]        Your Vitals Were     Pulse Temperature Respirations Height Pulse Oximetry Breastfeeding?    58 96.7  F (35.9  C) (Tympanic) 19 5' (1.524 m) 100% No    BMI (Body Mass Index)                   22.46 kg/m2            Blood Pressure from Last 3 Encounters:   05/24/18 114/68   11/20/17 130/60   07/11/17 112/54    Weight from Last 3 Encounters:   05/24/18 115 lb (52.2 kg)   11/20/17 118 lb (53.5 kg)   07/11/17 125 lb (56.7 kg)              We Performed the Following     CARDIOLOGY EVAL ADULT REFERRAL        Primary Care Provider Office Phone # Fax #    Vandana Jones [phone]       [facility] [place] [address]        Equal Access to Services     [facility]: Hadii aad ku hadasho Soomaali, waaxda luqadaha, qaybta kaalmada adejayada, marilu mares . So [facility] [phone].    ATENCIÓN: Si habla español, tiene a leon disposición servicios gratuitos de asistencia lingüística. [facility] [phone].    We comply with applicable federal civil rights laws and [place] laws. We do not discriminate on the basis of race, color, national origin, age, disability, sex, sexual orientation, or gender identity.            Thank you!     Thank you for choosing [facility]  for your care. Our goal is always to provide you with excellent care. Hearing back from our patients is one way we can continue to improve our services. Please take a few minutes to complete the written survey that you may receive in the mail after your visit with us. Thank you!              Your Updated Medication List - Protect others around you: Learn how to safely use, store and throw away your medicines at www.disposemymeds.org.          This list is accurate as of 5/24/18 10:13 AM.  Always use your most recent med list.                   Brand Name Dispense Instructions for use Diagnosis    acetaminophen 500 MG tablet    TYLENOL    100 tablet    Take 2 tablet (1000mg) by oral route every 6 hours as needed    Other unknown and unspecified cause of morbidity or mortality       amLODIPine 5 MG tablet    NORVASC    90 tablet    TAKE 1 TABLET DAILY.    Essential hypertension with goal blood pressure less than 140/90       benazepril 40 MG tablet    LOTENSIN    180 tablet    TAKE (1) TABLET BY MOUTH TWICE A DAY.    Essential hypertension with goal blood pressure less than 140/90       calcium 1500 MG Tabs     100 tablet    Take 1 tablet by mouth daily    Healthcare maintenance       isosorbide mononitrate 30 MG 24 hr tablet    IMDUR    90 tablet    TAKE ONE TABLET BY MOUTH AT BEDTIME.    Chronic ischemic heart disease       LORazepam 0.5 MG tablet    ATIVAN    30 tablet    Take 1 tablet (0.5 mg) by mouth every 8 hours as needed for anxiety    Anxiety       Multiple vitamin Tabs     900 tablet    Take 1 tablet by oral route every day with foodTake 1 tablet by oral route every day with food    Health care maintenance       nitroGLYcerin 0.4 MG sublingual tablet    NITROSTAT    25 tablet    Place 1 tablet (0.4mg) by sublingual route at the first sign of attack; may repeat ever 5 min until relief; if pain persists after 3 tablets in 15 minutes prompt medical attention is recommended. AS NEEDED    Chronic ischemic heart disease, unspecified       predniSONE 10 MG tablet    DELTASONE    30 tablet    Take 1 tablet (10 mg) by mouth daily    Pulmonary fibrosis (H)       STATIN NOT PRESCRIBED (INTENTIONAL)     0 each    Statin not prescribed intentionally due to Other patient declines (This option does not  exclude patient from measure)    Essential hypertension with goal blood pressure less than 140/90       triamterene-hydrochlorothiazide 37.5-25 MG per capsule    DYAZIDE    90 capsule    TAKE 1 CAPSULE BY MOUTH DAILY    Benign essential hypertension       vitamin D 1000 units capsule     90 capsule    Take 1 capsule by mouth daily    Health care maintenance

## 2018-05-24 NOTE — NURSING NOTE
Chief Complaint   Patient presents with     Abdominal Pain       Initial /68  Pulse 58  Temp 96.7  F (35.9  C) (Tympanic)  Resp 19  Ht 5' (1.524 m)  Wt 115 lb (52.2 kg)  SpO2 100%  Breastfeeding? No  BMI 22.46 kg/m2 Estimated body mass index is 22.46 kg/(m^2) as calculated from the following:    Height as of this encounter: 5' (1.524 m).    Weight as of this encounter: 115 lb (52.2 kg).  Medication Reconciliation: complete    Lucero Mckeon LPN

## 2018-05-24 NOTE — Clinical Note
Lauren Porter will be seeing you back for your opinion. I'm concerned she is having angina or abdominal angina. She also needs a chest Ct for follow up of pulmonary nodules and I would like to coordinate testing for her to limit her contrast load. Let me know what you think and recommend

## 2018-05-24 NOTE — PROGRESS NOTES
SUBJECTIVE:   Lauren Barron is a 89 year old female who presents to clinic today for the following health issues:      Abdominal Pain      Duration: 2 months     Description (location/character/radiation): very generalized abdominal pain       Associated flank pain: None    Intensity:  No pain today    Accompanying signs and symptoms:        Fever/Chills: no        Gas/Bloating: YES       Nausea/vomitting: no        Diarrhea: YES- from blueberries        Dysuria or Hematuria: no     History (previous similar pain/trauma/previous testing): none    Precipitating or alleviating factors:       Pain worse with eating/BM/urination: no       Pain relieved by BM: no     Therapies tried and outcome: None    LMP:  not applicable  This has been going on for several years but is getting worse. She was thoroughly evaluated in 2016 with the only abnormality being a large hiatal hernia and a splenic artery aneurysm. Her symptoms are worse with activity, particularly the use of her arms. She has pressure type symptoms that resolve with rest which are now occurring almost daily. The pressure is in the epigastric area and is followed by a hot flash type of symptom in the area. She notes it with vacuuming and raking. She has cut her prednisone down to 1/4 of 1/2 pill which is about 1 mg. She would like to stop this medication        Problem list and histories reviewed & adjusted, as indicated.  Additional history: as documented    Patient Active Problem List   Diagnosis     Epistaxis, recurrent     Advance Care Planning     Hyperlipidemia     Essential hypertension     Transient global amnesia     GI bleed     ASCVD (arteriosclerotic cardiovascular disease)     Squamous cell skin cancer, ala nasi     Allergic rhinitis     Meniere's disease     Diastolic dysfunction     Atherosclerosis of aorta (H)     Cystocele     Mammogram declined     Bradycardia     Aortic insufficiency     Mitral regurgitation     Osteoarthritis     Sick  sinus syndrome (H)     Acute posthemorrhagic anemia     Coronary arteriosclerosis in native artery     Epistaxis     Gastrointestinal hemorrhage     Multiple-type hyperlipidemia     Presence of cardiac pacemaker     Acute respiratory failure (H)     Thrombocytopenia (H)     Hiatal hernia     Splenic artery aneurysm (H)     Pulmonary fibrosis (H)     Benign essential hypertension     Pulmonary nodules     CKD (chronic kidney disease) stage 3, GFR 30-59 ml/min     Past Surgical History:   Procedure Laterality Date     APPENDECTOMY       ARTHROSCOPY KNEE      RIGHT - Osteoarthritis     C TOTAL KNEE ARTHROPLASTY      LEFT - Osteoarthritis - [facility]     C TOTAL KNEE ARTHROPLASTY      RIGHT - Osteoarthritis - [facility]     CATARACT EXTRACTION and LENS IMPLANTATION      BILATERAL     COLONOSCOPY  2012     Colonoscopy with Polypectomy  2012    GI BLEED - DR. DEL CASTILLO             REMOVAL OF OVARIAN CYST(S)       HYSTERECTOMY, YANG      Metorrhagia     IMPLANT PACEMAKER  2016     Left Subclavian Line, Triple Lumen      Gastric Ulcer, Dieulfoy lesion, hemostatic clips placed - Massive GI Bleed - Transferred to [facility]     RELEASE CARPAL TUNNEL      RIGHT - Carpal Tunnel Syndrome     REPAIR BLADDER         Social History   Substance Use Topics     Smoking status: Never Smoker     Smokeless tobacco: Never Used     Alcohol use No     Family History   Problem Relation Age of Onset     CANCER Brother      Pancreatic     CANCER Brother      Prostate     CANCER Sister      Breast     Hypertension Brother      Hypertension Father      Hypertension Sister      Hypertension Mother      Aneurysm Daughter 68     cerebral, sudden death     OSTEOPOROSIS Other      Thyroid Disease No family hx of      DIABETES No family hx of          Current Outpatient Prescriptions   Medication Sig Dispense Refill     acetaminophen (TYLENOL) 500 MG tablet Take 2 tablet (1000mg) by oral route every 6 hours  as needed 100 tablet 2     amLODIPine (NORVASC) 5 MG tablet TAKE 1 TABLET DAILY. 90 tablet 2     benazepril (LOTENSIN) 40 MG tablet TAKE (1) TABLET BY MOUTH TWICE A DAY. 180 tablet 1     calcium 1500 MG TABS Take 1 tablet by mouth daily 100 tablet 3     Cholecalciferol (VITAMIN D) 1000 UNITS capsule Take 1 capsule by mouth daily 90 capsule 3     isosorbide mononitrate (IMDUR) 30 MG 24 hr tablet TAKE ONE TABLET BY MOUTH AT BEDTIME. 90 tablet 1     LORazepam (ATIVAN) 0.5 MG tablet Take 1 tablet (0.5 mg) by mouth every 8 hours as needed for anxiety 30 tablet 0     Multiple vitamin TABS Take 1 tablet by oral route every day with foodTake 1 tablet by oral route every day with food 900 tablet 3     nitroglycerin (NITROSTAT) 0.4 MG sublingual tablet Place 1 tablet (0.4mg) by sublingual route at the first sign of attack; may repeat ever 5 min until relief; if pain persists after 3 tablets in 15 minutes prompt medical attention is recommended. AS NEEDED 25 tablet 3     predniSONE (DELTASONE) 10 MG tablet Take 1 tablet (10 mg) by mouth daily 30 tablet 0     STATIN NOT PRESCRIBED, INTENTIONAL, Statin not prescribed intentionally due to Other patient declines (This option does not exclude patient from measure) 0 each 0     triamterene-hydrochlorothiazide (DYAZIDE) 37.5-25 MG per capsule TAKE 1 CAPSULE BY MOUTH DAILY 90 capsule 0     Allergies   Allergen Reactions     [place] Juice Other (See Comments), Nausea and GI Disturbance     Vertigo     Food      Oranges.     Naprosyn [Naproxen]      Incontinence       Niacin Swelling     BP Readings from Last 3 Encounters:   05/24/18 114/68   11/20/17 130/60   07/11/17 112/54    Wt Readings from Last 3 Encounters:   05/24/18 115 lb (52.2 kg)   11/20/17 118 lb (53.5 kg)   07/11/17 125 lb (56.7 kg)                    Reviewed and updated as needed this visit by clinical staff  Tobacco  Allergies  Med Hx  Surg Hx  Fam Hx  Soc Hx      Reviewed and updated as needed this visit by  Provider         ROS:  Constitutional, HEENT, cardiovascular, pulmonary, gi and gu systems are negative, except as otherwise noted.    OBJECTIVE:                                                    /68  Pulse 58  Temp 96.7  F (35.9  C) (Tympanic)  Resp 19  Ht 5' (1.524 m)  Wt 115 lb (52.2 kg)  SpO2 100%  Breastfeeding? No  BMI 22.46 kg/m2  Body mass index is 22.46 kg/(m^2).  GENERAL APPEARANCE: healthy, alert and no distress  NECK: no adenopathy, no asymmetry, masses, or scars and thyroid normal to palpation  RESP: lungs clear to auscultation - no rales, rhonchi or wheezes  CVS: S1S2 regular with a grade 1/6 DIVYA without radiation  LYMPHATICS: no cervical adenopathy  ABDOMEN: soft, nontender, without hepatosplenomegaly or masses and bowel sounds normal  NEURO: Normal strength and tone, mentation intact and speech normal  PSYCH: mentation appears normal and affect normal/bright         ASSESSMENT/PLAN:                                                    1. Abdominal pain, epigastric  i'm concerned this may be anginal. Will ask Dr Mckeon to evaluate for his opinion recommendation regarding testing. Last stress test was Aug 25 2009 at [facility]. She also is due for a follow up Ct of the chest to evaluate pulmonary nodules and would want to coordinate testing given her chronic kidney disease and contrast load.   - CARDIOLOGY EVAL ADULT REFERRAL    2. Splenic artery aneurysm (H)      3. Pulmonary fibrosis (H)  Stable, improved. Advised to stop prednisone as she is on approximately 1 mg a day    4. Coronary arteriosclerosis in native artery      5. Pulmonary nodules  Due for follow up CT scan          Vandana Jones MD  [facility]

## 2018-05-25 ASSESSMENT — ANXIETY QUESTIONNAIRES: GAD7 TOTAL SCORE: 0

## 2018-05-25 ASSESSMENT — PATIENT HEALTH QUESTIONNAIRE - PHQ9: SUM OF ALL RESPONSES TO PHQ QUESTIONS 1-9: 0

## 2018-06-14 DIAGNOSIS — F41.9 ANXIETY: ICD-10-CM

## 2018-06-14 DIAGNOSIS — J84.10 PULMONARY FIBROSIS (H): ICD-10-CM

## 2018-06-14 RX ORDER — PREDNISONE 10 MG/1
TABLET ORAL
Qty: 90 TABLET | Refills: 0 | Status: SHIPPED | OUTPATIENT
Start: 2018-06-14 | End: 2019-05-21

## 2018-06-14 RX ORDER — LORAZEPAM 0.5 MG/1
0.5 TABLET ORAL EVERY 8 HOURS PRN
Qty: 30 TABLET | Refills: 0 | Status: SHIPPED | OUTPATIENT
Start: 2018-06-14 | End: 2018-10-08

## 2018-06-14 NOTE — TELEPHONE ENCOUNTER
lorazepam      Last Written Prescription Date:  4/10/18  Last Fill Quantity: 30,   # refills: 0  Last Office Visit: 5/24/18  Future Office visit:       Routing refill request to provider for review/approval because:  Drug not on the [facility], [facility] or [facility] refill protocol or controlled substance  ]prednisone      Last Written Prescription Date:  2/29/18  Last Fill Quantity: 30,   # refills: 0  Last Office Visit: 5/24/18  Future Office visit:       Routing refill request to provider for review/approval because:  Drug not on the [facility], [facility] or [facility] refill protocol or controlled substance

## 2018-06-20 ENCOUNTER — TELEPHONE (OUTPATIENT)
Dept: CARDIOLOGY | Facility: OTHER | Age: 83
End: 2018-06-20

## 2018-06-20 NOTE — TELEPHONE ENCOUNTER
Patient cancelled her new cardiology appointment appointment with Dr. Mckeon on 6/15/2018.    Is she following elsewhere or should the [facility] call to offer to reschedule another appointment?

## 2018-08-13 ENCOUNTER — TELEPHONE (OUTPATIENT)
Dept: FAMILY MEDICINE | Facility: OTHER | Age: 83
End: 2018-08-13

## 2018-08-13 DIAGNOSIS — H93.8X3 PLUGGED FEELING IN EAR, BILATERAL: Primary | ICD-10-CM

## 2018-08-13 NOTE — TELEPHONE ENCOUNTER
8:36 AM    Reason for Call: OVERBOOK    Patient is having the following symptoms: ear cleaning  for 1 weeks.    The patient is requesting an appointment for ASAP with Dr.Susan Jones.    Was an appointment offered for this call? yes  If yes : Appointment type short              Date 09/06/18    Preferred method for responding to this message: Telephone Call  What is your phone number ?[phone]    If we cannot reach you directly, may we leave a detailed response at the number you provided? Yes    Can this message wait until your PCP/provider returns, if unavailable today? Not applicable, PCP is in     [facility]

## 2018-08-13 NOTE — TELEPHONE ENCOUNTER
Please schedule patient for date/time: per Dr Vandana Jones patient should see ENT for this issue, referral done for stat call to see if she can get in this week.     Have patient go to ER/Urgent Care Center. Urgent Care hours are 9:30 am to 8 pm, open 7 days a week. No.    Provider will call patient.No.    Other:

## 2018-08-14 ENCOUNTER — OFFICE VISIT (OUTPATIENT)
Dept: OTOLARYNGOLOGY | Facility: OTHER | Age: 83
End: 2018-08-14
Attending: NURSE PRACTITIONER
Payer: COMMERCIAL

## 2018-08-14 VITALS
HEART RATE: 63 BPM | DIASTOLIC BLOOD PRESSURE: 54 MMHG | SYSTOLIC BLOOD PRESSURE: 120 MMHG | HEIGHT: 60 IN | OXYGEN SATURATION: 99 % | BODY MASS INDEX: 22.58 KG/M2 | TEMPERATURE: 97.5 F | WEIGHT: 115 LBS

## 2018-08-14 DIAGNOSIS — H61.23 BILATERAL IMPACTED CERUMEN: Primary | ICD-10-CM

## 2018-08-14 DIAGNOSIS — H91.90 HEARING LOSS, UNSPECIFIED HEARING LOSS TYPE, UNSPECIFIED LATERALITY: ICD-10-CM

## 2018-08-14 PROCEDURE — 69210 REMOVE IMPACTED EAR WAX UNI: CPT | Performed by: NURSE PRACTITIONER

## 2018-08-14 PROCEDURE — G0463 HOSPITAL OUTPT CLINIC VISIT: HCPCS

## 2018-08-14 PROCEDURE — 92504 EAR MICROSCOPY EXAMINATION: CPT | Performed by: NURSE PRACTITIONER

## 2018-08-14 PROCEDURE — 99213 OFFICE O/P EST LOW 20 MIN: CPT | Mod: 25 | Performed by: NURSE PRACTITIONER

## 2018-08-14 ASSESSMENT — PAIN SCALES - GENERAL: PAINLEVEL: NO PAIN (0)

## 2018-08-14 NOTE — MR AVS SNAPSHOT
After Visit Summary   8/14/2018    Lauren Barron    MRN: [id number]           Patient Information     Date Of Birth          1/17/1926        Visit Information        Provider Department      8/14/2018 9:15 AM Gladys Perez APRN CNP [facility]        Today's Diagnoses     Plugged feeling in ear, bilateral          Care Instructions    Follow up with routine ear cleaning. Complete audio sometime within the next month.  Thank you for allowing Gladys Perez CNP and our ENT team to participate in your care.  If your medications are too expensive, please give the nurse a call.  We can possibly change this medication.  If you have a scheduling or an appointment question please contact [facility] Unit Coordinator at their direct line [phone].   ALL nursing questions or concerns can be directed to your ENT nurse at: [phone] - alex            Follow-ups after your visit        Who to contact     If you have questions or need follow up information about today's clinic visit or your schedule please contact [facility] directly at [phone].  Normal or non-critical lab and imaging results will be communicated to you by MyChart, letter or phone within 4 business days after the clinic has received the results. If you do not hear from us within 7 days, please contact the clinic through MyChart or phone. If you have a critical or abnormal lab result, we will notify you by phone as soon as possible.  Submit refill requests through [employer] or call your pharmacy and they will forward the refill request to us. Please allow 3 business days for your refill to be completed.          Additional Information About Your Visit        Care EveryWhere ID     This is your Care EveryWhere ID. This could be used by other organizations to access your [facility] medical records  [id number]        Your Vitals Were     Pulse Temperature Height Pulse Oximetry BMI (Body Mass Index)        63 97.5  F (36.4  C) (Tympanic) 5' (1.524 m) 99% 22.46 kg/m2        Blood Pressure from Last 3 Encounters:   08/14/18 120/54   05/24/18 114/68   11/20/17 130/60    Weight from Last 3 Encounters:   08/14/18 115 lb (52.2 kg)   05/24/18 115 lb (52.2 kg)   11/20/17 118 lb (53.5 kg)              Today, you had the following     No orders found for display       Primary Care Provider Office Phone # Fax #    Vandana Jones [phone] [address] [address]        Equal Access to Services     KEHINDE ORDONEZ : Tatinaa Lewis, jaclyn thomas, abdelrahman kaalmasharyn jang, marilu mann. So [facility] [phone].    ATENCIÓN: Si habla español, tiene a leon disposición servicios gratuitos de asistencia lingüística. Llame al [phone].    We comply with applicable federal civil rights laws and [place] laws. We do not discriminate on the basis of race, color, national origin, age, disability, sex, sexual orientation, or gender identity.            Thank you!     Thank you for choosing [facility]  for your care. Our goal is always to provide you with excellent care. Hearing back from our patients is one way we can continue to improve our services. Please take a few minutes to complete the written survey that you may receive in the mail after your visit with us. Thank you!             Your Updated Medication List - Protect others around you: Learn how to safely use, store and throw away your medicines at www.disposemymeds.org.          This list is accurate as of 8/14/18  9:41 AM.  Always use your most recent med list.                   Brand Name Dispense Instructions for use Diagnosis    acetaminophen 500 MG tablet    TYLENOL    100 tablet    Take 2 tablet (1000mg) by oral route every 6 hours as needed    Other unknown and unspecified cause of morbidity or mortality       amLODIPine 5 MG tablet    NORVASC    90 tablet    TAKE 1  TABLET DAILY.    Essential hypertension with goal blood pressure less than 140/90       benazepril 40 MG tablet    LOTENSIN    180 tablet    TAKE (1) TABLET BY MOUTH TWICE A DAY.    Essential hypertension with goal blood pressure less than 140/90       calcium 1500 MG Tabs     100 tablet    Take 1 tablet by mouth daily    Healthcare maintenance       isosorbide mononitrate 30 MG 24 hr tablet    IMDUR    90 tablet    TAKE ONE TABLET BY MOUTH AT BEDTIME.    Chronic ischemic heart disease       LORazepam 0.5 MG tablet    ATIVAN    30 tablet    Take 1 tablet (0.5 mg) by mouth every 8 hours as needed for anxiety    Anxiety       Multiple vitamin Tabs     900 tablet    Take 1 tablet by oral route every day with foodTake 1 tablet by oral route every day with food    Health care maintenance       nitroGLYcerin 0.4 MG sublingual tablet    NITROSTAT    25 tablet    Place 1 tablet (0.4mg) by sublingual route at the first sign of attack; may repeat ever 5 min until relief; if pain persists after 3 tablets in 15 minutes prompt medical attention is recommended. AS NEEDED    Chronic ischemic heart disease, unspecified       predniSONE 10 MG tablet    DELTASONE    90 tablet    TAKE 1 TABLET BY MOUTH ONCE DAILY.    Pulmonary fibrosis (H)       STATIN NOT PRESCRIBED (INTENTIONAL)     0 each    Statin not prescribed intentionally due to Other patient declines (This option does not exclude patient from measure)    Essential hypertension with goal blood pressure less than 140/90       triamterene-hydrochlorothiazide 37.5-25 MG per capsule    DYAZIDE    90 capsule    TAKE 1 CAPSULE BY MOUTH DAILY    Benign essential hypertension       vitamin D 1000 units capsule     90 capsule    Take 1 capsule by mouth daily    Health care maintenance

## 2018-08-14 NOTE — PATIENT INSTRUCTIONS
Follow up with routine ear cleaning. Complete audio sometime within the next month.  Thank you for allowing Gladys Perez CNP and our ENT team to participate in your care.  If your medications are too expensive, please give the nurse a call.  We can possibly change this medication.  If you have a scheduling or an appointment question please contact [facility] Unit Coordinator at their direct line [phone].   ALL nursing questions or concerns can be directed to your ENT nurse at: [phone] - [place]

## 2018-08-14 NOTE — PROGRESS NOTES
Otolaryngology Progress Note           Chief Complaint:     Patient presents with:  Consult: plugged feeling in ear- SNEHAL Jones         History of Present Illness:     Lauren Barron is a 89 year old female for an ear cleaning.     Hearing loss has been gradual over the years with no acute changes. No fluctuating hearing loss. Feels her ears are more plugged due to cerumen.   No otalgia, otorrhea.  Vertigo: denies  Tinnitus: denies  There is no facial weakness, facial numbness or dysphagia.  No COM, otologic surgeries or trauma.  No significant noise exposure.  Family history with adult onset of hearing loss.     Audiogram: none recently         Review of Systems:     See HPI         Physical Exam:     /54 (BP Location: Right arm, Patient Position: Chair, Cuff Size: Adult Regular)  Pulse 63  Temp 97.5  F (36.4  C) (Tympanic)  Ht 5' (1.524 m)  Wt 115 lb (52.2 kg)  SpO2 99%  BMI 22.46 kg/m2  General - The patient is well nourished and well developed, and appears to have good nutritional status.  Alert and oriented to person and place, interactive.  Head and Face - Normocephalic and atraumatic, with no gross asymmetry noted of the contour of the facial features.  The facial nerve is intact, with strong symmetric movements.  Neck-no palpable lymphadenopathy or thyroid mass.  Trachea is midline.  Eyes - Extraocular movements intact.   Ears- External ears normal. The ear canals were examined underneath the operating microscope and with an otologic speculum. The canals were cleaned of all debris with  use of alligator forceps and cerumen loop. Bilateral EAC's stenotic. There is no granulation tissue or sign of cholesteatoma. The patient tolerates this well. TM view limited due to stenotic EAC's, posterior TM's visible and are intact without effusion.  Nose - Nasal mucosa is pink and moist with no abnormal mucus.  The septum was grossly midline and non-obstructive, turbinates of normal size and position.  No  polyps, masses, or purulence noted on examination.  Mouth - Examination of the oral cavity shows pink, healthy, moist mucosa. Dentition in good condition.  No lesions or ulceration noted. The tongue is mobile and midline.    Throat - The walls of the oropharynx were smooth, pink, moist, symmetric, and had no lesions or ulcerations.  The tonsillar pillars and soft palate were symmetric.  The uvula was midline on elevation.           Assessment and Plan:       ICD-10-CM    1. Bilateral impacted cerumen H61.23    2. Hearing loss, unspecified hearing loss type, unspecified laterality H91.90      The ears were cleaned today. Aural hygiene for the ear canals was discussed.  Avoidance of Q-tips was highly encouraged. The patient was told to avoid flushing the ear canal as there is a risk of perforating the ear drum.      She will get an audiogram sooner and recommend annual audiograms thereafter, sooner with any hearing changes.     She is going to return every 6-8 months for routine ear cleaning.    The patient may return here sooner as needed.      Gladys Perez NP  ENT  [facility]  [phone]

## 2018-08-14 NOTE — LETTER
8/14/2018         RE: Lauren Barron  [address]  [place] [address]        Dear Colleague,    Thank you for referring your patient, Lauren Barron, to the [facility]. Please see a copy of my visit note below.    Otolaryngology Progress Note           Chief Complaint:     Patient presents with:  Consult: plugged feeling in ear- SNEHAL Jones         History of Present Illness:     Lauren Barron is a 89 year old female for an ear cleaning.     Hearing loss has been gradual over the years with no acute changes. No fluctuating hearing loss. Feels her ears are more plugged due to cerumen.   No otalgia, otorrhea.  Vertigo: denies  Tinnitus: denies  There is no facial weakness, facial numbness or dysphagia.  No COM, otologic surgeries or trauma.  No significant noise exposure.  Family history with adult onset of hearing loss.     Audiogram: none recently         Review of Systems:     See HPI         Physical Exam:     /54 (BP Location: Right arm, Patient Position: Chair, Cuff Size: Adult Regular)  Pulse 63  Temp 97.5  F (36.4  C) (Tympanic)  Ht 5' (1.524 m)  Wt 115 lb (52.2 kg)  SpO2 99%  BMI 22.46 kg/m2  General - The patient is well nourished and well developed, and appears to have good nutritional status.  Alert and oriented to person and place, interactive.  Head and Face - Normocephalic and atraumatic, with no gross asymmetry noted of the contour of the facial features.  The facial nerve is intact, with strong symmetric movements.  Neck-no palpable lymphadenopathy or thyroid mass.  Trachea is midline.  Eyes - Extraocular movements intact.   Ears- External ears normal. The ear canals were examined underneath the operating microscope and with an otologic speculum. The canals were cleaned of all debris with  use of alligator forceps and cerumen loop. Bilateral EAC's stenotic. There is no granulation tissue or sign of cholesteatoma. The patient tolerates this well. TM view  limited due to stenotic EAC's, posterior TM's visible and are intact without effusion.  Nose - Nasal mucosa is pink and moist with no abnormal mucus.  The septum was grossly midline and non-obstructive, turbinates of normal size and position.  No polyps, masses, or purulence noted on examination.  Mouth - Examination of the oral cavity shows pink, healthy, moist mucosa. Dentition in good condition.  No lesions or ulceration noted. The tongue is mobile and midline.    Throat - The walls of the oropharynx were smooth, pink, moist, symmetric, and had no lesions or ulcerations.  The tonsillar pillars and soft palate were symmetric.  The uvula was midline on elevation.           Assessment and Plan:       ICD-10-CM    1. Bilateral impacted cerumen H61.23    2. Hearing loss, unspecified hearing loss type, unspecified laterality H91.90      The ears were cleaned today. Aural hygiene for the ear canals was discussed.  Avoidance of Q-tips was highly encouraged. The patient was told to avoid flushing the ear canal as there is a risk of perforating the ear drum.      She will get an audiogram sooner and recommend annual audiograms thereafter, sooner with any hearing changes.     She is going to return every 6-8 months for routine ear cleaning.    The patient may return here sooner as needed.      Gladys Perez, NP  ENT  [facility] [place]  [phone]      Again, thank you for allowing me to participate in the care of your patient.        Sincerely,        Gladys Perez, MONIKA CNP

## 2018-08-14 NOTE — NURSING NOTE
Chief Complaint   Patient presents with     Consult     plugged feeling in ear- S. Jamesberg       Initial /54 (BP Location: Right arm, Patient Position: Chair, Cuff Size: Adult Regular)  Pulse 63  Temp 97.5  F (36.4  C) (Tympanic)  Ht 5' (1.524 m)  Wt 115 lb (52.2 kg)  SpO2 99%  BMI 22.46 kg/m2 Estimated body mass index is 22.46 kg/(m^2) as calculated from the following:    Height as of this encounter: 5' (1.524 m).    Weight as of this encounter: 115 lb (52.2 kg).  Medication Reconciliation: complete    Deedee Goncalves LPN

## 2018-10-03 ENCOUNTER — TELEPHONE (OUTPATIENT)
Dept: FAMILY MEDICINE | Facility: OTHER | Age: 83
End: 2018-10-03

## 2018-10-03 NOTE — TELEPHONE ENCOUNTER
Spoke to daughter - transferred to scheduling to set up next available appointment.     Lalita Morgan LPN

## 2018-10-03 NOTE — TELEPHONE ENCOUNTER
9:59 AM    Reason for Call: Phone Call    Description: Pts daughter called and states that she would like a call back regarding her mom and some questions she has.     Was an appointment offered for this call? No  If yes : Appointment type              Date    Preferred method for responding to this message: Telephone Call  What is your phone number ?[phone]      If we cannot reach you directly, may we leave a detailed response at the number you provided? Yes    Can this message wait until your PCP/provider returns, if available today? No, pcp is out     Ana Knight

## 2018-10-08 DIAGNOSIS — F41.9 ANXIETY: ICD-10-CM

## 2018-10-09 RX ORDER — LORAZEPAM 0.5 MG/1
TABLET ORAL
Qty: 30 TABLET | Refills: 0 | Status: SHIPPED | OUTPATIENT
Start: 2018-10-09 | End: 2019-05-20

## 2018-10-09 NOTE — TELEPHONE ENCOUNTER
Ativan  Last visit: 5.24.18  Last refill: 6.14.18 #30    Next 5 appointments (look out 90 days)     Oct 29, 2018 11:00 AM CDT   (Arrive by 10:45 AM)   [facility] [place]    [address] [place] [address]   [phone]            Nov 26, 2018  9:00 AM CST   (Arrive by 8:45 AM)   [facility] )    [address] [place] [address]   [phone]

## 2018-10-23 DIAGNOSIS — I10 BENIGN ESSENTIAL HYPERTENSION: ICD-10-CM

## 2018-10-23 DIAGNOSIS — I10 ESSENTIAL HYPERTENSION WITH GOAL BLOOD PRESSURE LESS THAN 140/90: ICD-10-CM

## 2018-10-23 RX ORDER — AMLODIPINE BESYLATE 5 MG/1
TABLET ORAL
Qty: 90 TABLET | Refills: 0 | Status: SHIPPED | OUTPATIENT
Start: 2018-10-23 | End: 2019-01-22

## 2018-10-23 RX ORDER — TRIAMTERENE AND HYDROCHLOROTHIAZIDE 37.5; 25 MG/1; MG/1
CAPSULE ORAL
Qty: 90 CAPSULE | Refills: 0 | Status: SHIPPED | OUTPATIENT
Start: 2018-10-23 | End: 2019-01-22

## 2018-10-23 NOTE — TELEPHONE ENCOUNTER
Next 5 appointments (look out 90 days)     Oct 29, 2018 11:00 AM CDT   (Arrive by 10:45 AM)   [facility]    [address] [place] [address]   [phone]            Nov 26, 2018  9:00 AM CST   (Arrive by 8:45 AM)   [facility]    [address] [place] [address]   [phone]                Pt has appt scheduled for f/u will fill request to get pt by until appt.

## 2018-10-25 NOTE — PROGRESS NOTES
SUBJECTIVE:   Lauren Barron is a 89 year old female who presents to clinic today for the following health issues:      Hypertension Follow-up      Outpatient blood pressures are being checked at home.  Results are good.    Low Salt Diet: no added salt    Anxiety Follow-Up    Status since last visit: Improved     Other associated symptoms:None    Complicating factors:   Significant life event: No   Current substance abuse: None  Depression symptoms: No  ORLANDO-7 SCORE 11/20/2017 5/24/2018   Total Score 0 0       ORLANDO-7    Amount of exercise or physical activity: None    Problems taking medications regularly: No    Medication side effects: none    Diet: regular (no restrictions)    She is accompanied by her two daughters, Yesica and Raeann. They note she had awakened one night a couple of weeks ago in the middle of the night confused which cleared that morning and has not recurred. She is otherwise doing well. They are concerned that she may have had a UTI though she has has not had symptoms.         Problem list and histories reviewed & adjusted, as indicated.  Additional history: as documented    Patient Active Problem List   Diagnosis     Epistaxis, recurrent     Advance Care Planning     Hyperlipidemia     Essential hypertension     Transient global amnesia     GI bleed     ASCVD (arteriosclerotic cardiovascular disease)     Squamous cell skin cancer, ala nasi     Allergic rhinitis     Meniere's disease     Diastolic dysfunction     Atherosclerosis of aorta (H)     Cystocele     Mammogram declined     Bradycardia     Aortic insufficiency     Mitral regurgitation     Osteoarthritis     Sick sinus syndrome (H)     Acute posthemorrhagic anemia     Coronary arteriosclerosis in native artery     Epistaxis     Gastrointestinal hemorrhage     Multiple-type hyperlipidemia     Presence of cardiac pacemaker     Thrombocytopenia (H)     Hiatal hernia     Splenic artery aneurysm (H)     Pulmonary fibrosis (H)     Benign  essential hypertension     Pulmonary nodules     CKD (chronic kidney disease) stage 3, GFR 30-59 ml/min (H)     Other emphysema (H)     Past Surgical History:   Procedure Laterality Date     APPENDECTOMY       ARTHROSCOPY KNEE      RIGHT - Osteoarthritis     C TOTAL KNEE ARTHROPLASTY      LEFT - Osteoarthritis - [facility] Ruvalc[facility]     C TOTAL KNEE ARTHROPLASTY      RIGHT - Osteoarthritis - [facility] Ruvalc[facility]     CATARACT EXTRACTION and LENS IMPLANTATION      BILATERAL     COLONOSCOPY       Colonoscopy with Polypectomy      GI BLEED - DR. DEL CASTILLO            HC REMOVAL OF OVARIAN CYST(S)       HYSTERECTOMY, YANG      Metorrhagia     IMPLANT PACEMAKER  2016     Left Subclavian Line, Triple Lumen      Gastric Ulcer, Dieulfoy lesion, hemostatic clips placed - Massive GI Bleed - Transferred to [facility]     RELEASE CARPAL TUNNEL      RIGHT - Carpal Tunnel Syndrome     REPAIR BLADDER         Social History   Substance Use Topics     Smoking status: Never Smoker     Smokeless tobacco: Never Used     Alcohol use No     Family History   Problem Relation Age of Onset     Cancer Brother      Pancreatic     Cancer Brother      Prostate     Cancer Sister      Breast     Hypertension Brother      Hypertension Father      Hypertension Sister      Hypertension Mother      Aneurysm Daughter 68     cerebral, sudden death     Osteoporosis Other      Thyroid Disease No family hx of      Diabetes No family hx of          Current Outpatient Prescriptions   Medication Sig Dispense Refill     acetaminophen (TYLENOL) 500 MG tablet Take 2 tablet (1000mg) by oral route every 6 hours as needed 100 tablet 2     amLODIPine (NORVASC) 5 MG tablet TAKE 1 TABLET BY MOUTH ONCE DAILY. 90 tablet 0     benazepril (LOTENSIN) 40 MG tablet TAKE (1) TABLET BY MOUTH TWICE A DAY. 180 tablet 1     calcium 1500 MG TABS Take 1 tablet by mouth daily 100 tablet 3     Cholecalciferol (VITAMIN D) 1000 UNITS capsule Take 1 capsule  by mouth daily 90 capsule 3     isosorbide mononitrate (IMDUR) 30 MG 24 hr tablet TAKE ONE TABLET BY MOUTH AT BEDTIME. 90 tablet 1     LORazepam (ATIVAN) 0.5 MG tablet TAKE 1 TABLET BY MOUTH EVERY 8 HOURS AS NEEDED FOR ANXIETY 30 tablet 0     Multiple vitamin TABS Take 1 tablet by oral route every day with foodTake 1 tablet by oral route every day with food 900 tablet 3     nitroglycerin (NITROSTAT) 0.4 MG sublingual tablet Place 1 tablet (0.4mg) by sublingual route at the first sign of attack; may repeat ever 5 min until relief; if pain persists after 3 tablets in 15 minutes prompt medical attention is recommended. AS NEEDED 25 tablet 3     STATIN NOT PRESCRIBED, INTENTIONAL, Statin not prescribed intentionally due to Other patient declines (This option does not exclude patient from measure) 0 each 0     triamterene-hydrochlorothiazide (DYAZIDE) 37.5-25 MG per capsule TAKE 1 CAPSULE BY MOUTH DAILY 90 capsule 0     predniSONE (DELTASONE) 10 MG tablet TAKE 1 TABLET BY MOUTH ONCE DAILY. (Patient not taking: Reported on 10/29/2018) 90 tablet 0     Allergies   Allergen Reactions     [place] Juice Other (See Comments), Nausea and GI Disturbance     Vertigo     Food      Oranges.     Naprosyn [Naproxen]      Incontinence       Niacin Swelling     BP Readings from Last 3 Encounters:   10/29/18 126/74   08/14/18 120/54   05/24/18 114/68    Wt Readings from Last 3 Encounters:   10/29/18 114 lb (51.7 kg)   08/14/18 115 lb (52.2 kg)   05/24/18 115 lb (52.2 kg)                    Reviewed and updated as needed this visit by clinical staff       Reviewed and updated as needed this visit by Provider         ROS:  Constitutional, HEENT, cardiovascular, pulmonary, gi and gu systems are negative, except as otherwise noted.    OBJECTIVE:                                                    /74  Pulse 60  Wt 114 lb (51.7 kg)  SpO2 95%  Breastfeeding? No  BMI 22.26 kg/m2  Body mass index is 22.26 kg/(m^2).  GENERAL APPEARANCE:  healthy, alert and no distress  NECK: no adenopathy, no asymmetry, masses, or scars and thyroid normal to palpation  RESP: lungs clear to auscultation - no rales, rhonchi or wheezes  CV: regular rates and rhythm, normal S1 S2, no S3 or S4 and no murmur, click or rub  MS: extremities normal- no gross deformities noted, no edema  NEURO: Normal strength and tone, mentation intact and speech normal  PSYCH: mentation appears normal and affect normal/bright         ASSESSMENT/PLAN:                                                    1. Essential hypertension  Check labs. They would like to have someone come in and set up medications for her, and check BPs weekly. They are able to get meals for her and she doesn't want Meals on Wheels. Will refer for home health   - CBC with platelets and differential  - Comprehensive metabolic panel  - HOME CARE NURSING REFERRAL    2. Anxiety  Stable with prn Klonopin    3. Need for prophylactic vaccination and inoculation against influenza  Given today  - HC FLU VACCINE, INCREASED ANTIGEN, PRESV FREE  - Vaccine Administration, Initial [45526]    4. Thrombocytopenia (H)  Recheck labs  - HOME CARE NURSING REFERRAL    5. Sick sinus syndrome (H)  Stable with pacemaker placed  - HOME CARE NURSING REFERRAL    6. Other emphysema (H)  Stable     7. Episode of confusion  Will check UA to rule out UTI  - UA reflex to Microscopic and Culture    8. Abnormal urine findings    - Urine Culture Aerobic Bacterial    9. Hyponatremia  Check osmolality  - Osmolarity ( Serum)  - Osmolality urine  - Sodium random urine      Follow up with Provider - 3 months, sooner for concerns     Vandana Jones MD  [facility]    Injectable Influenza Immunization Documentation    1.  Is the person to be vaccinated sick today?   No    2. Does the person to be vaccinated have an allergy to a component   of the vaccine?   No  Egg Allergy Algorithm Link    3. Has the person to be vaccinated ever had a  serious reaction   to influenza vaccine in the past?   No    4. Has the person to be vaccinated ever had Guillain-Barré syndrome?   No    Form completed by     Lucero Mckeon

## 2018-10-29 ENCOUNTER — OFFICE VISIT (OUTPATIENT)
Dept: FAMILY MEDICINE | Facility: OTHER | Age: 83
End: 2018-10-29
Attending: FAMILY MEDICINE
Payer: COMMERCIAL

## 2018-10-29 VITALS
SYSTOLIC BLOOD PRESSURE: 126 MMHG | BODY MASS INDEX: 22.26 KG/M2 | DIASTOLIC BLOOD PRESSURE: 74 MMHG | WEIGHT: 114 LBS | HEART RATE: 60 BPM | OXYGEN SATURATION: 95 %

## 2018-10-29 DIAGNOSIS — R82.90 ABNORMAL URINE FINDINGS: ICD-10-CM

## 2018-10-29 DIAGNOSIS — I49.5 SICK SINUS SYNDROME (H): ICD-10-CM

## 2018-10-29 DIAGNOSIS — N30.00 ACUTE CYSTITIS WITHOUT HEMATURIA: ICD-10-CM

## 2018-10-29 DIAGNOSIS — E87.1 HYPONATREMIA: ICD-10-CM

## 2018-10-29 DIAGNOSIS — I10 ESSENTIAL HYPERTENSION: Primary | ICD-10-CM

## 2018-10-29 DIAGNOSIS — J43.8 OTHER EMPHYSEMA (H): ICD-10-CM

## 2018-10-29 DIAGNOSIS — R91.8 PULMONARY NODULES: ICD-10-CM

## 2018-10-29 DIAGNOSIS — Z23 NEED FOR PROPHYLACTIC VACCINATION AND INOCULATION AGAINST INFLUENZA: ICD-10-CM

## 2018-10-29 DIAGNOSIS — R41.0 EPISODE OF CONFUSION: ICD-10-CM

## 2018-10-29 DIAGNOSIS — D69.6 THROMBOCYTOPENIA (H): ICD-10-CM

## 2018-10-29 DIAGNOSIS — F41.9 ANXIETY: ICD-10-CM

## 2018-10-29 LAB
ALBUMIN SERPL-MCNC: 3.7 G/DL (ref 3.4–5)
ALBUMIN UR-MCNC: NEGATIVE MG/DL
ALP SERPL-CCNC: 54 U/L (ref 40–150)
ALT SERPL W P-5'-P-CCNC: 35 U/L (ref 0–50)
ANION GAP SERPL CALCULATED.3IONS-SCNC: 5 MMOL/L (ref 3–14)
APPEARANCE UR: CLEAR
AST SERPL W P-5'-P-CCNC: 26 U/L (ref 0–45)
BACTERIA #/AREA URNS HPF: ABNORMAL /HPF
BASOPHILS # BLD AUTO: 0 10E9/L (ref 0–0.2)
BASOPHILS NFR BLD AUTO: 0.6 %
BILIRUB SERPL-MCNC: 0.4 MG/DL (ref 0.2–1.3)
BILIRUB UR QL STRIP: NEGATIVE
BUN SERPL-MCNC: 34 MG/DL (ref 7–30)
CALCIUM SERPL-MCNC: 8.9 MG/DL (ref 8.5–10.1)
CHLORIDE SERPL-SCNC: 94 MMOL/L (ref 94–109)
CO2 SERPL-SCNC: 28 MMOL/L (ref 20–32)
COLOR UR AUTO: YELLOW
CREAT SERPL-MCNC: 1.08 MG/DL (ref 0.52–1.04)
DIFFERENTIAL METHOD BLD: ABNORMAL
EOSINOPHIL # BLD AUTO: 0.1 10E9/L (ref 0–0.7)
EOSINOPHIL NFR BLD AUTO: 1.5 %
ERYTHROCYTE [DISTWIDTH] IN BLOOD BY AUTOMATED COUNT: 13.2 % (ref 10–15)
GFR SERPL CREATININE-BSD FRML MDRD: 47 ML/MIN/1.7M2
GLUCOSE SERPL-MCNC: 95 MG/DL (ref 70–99)
GLUCOSE UR STRIP-MCNC: NEGATIVE MG/DL
HCT VFR BLD AUTO: 34 % (ref 35–47)
HGB BLD-MCNC: 11.9 G/DL (ref 11.7–15.7)
HGB UR QL STRIP: NEGATIVE
IMM GRANULOCYTES # BLD: 0 10E9/L (ref 0–0.4)
IMM GRANULOCYTES NFR BLD: 0.1 %
KETONES UR STRIP-MCNC: NEGATIVE MG/DL
LEUKOCYTE ESTERASE UR QL STRIP: ABNORMAL
LYMPHOCYTES # BLD AUTO: 1.7 10E9/L (ref 0.8–5.3)
LYMPHOCYTES NFR BLD AUTO: 24.7 %
MCH RBC QN AUTO: 30 PG (ref 26.5–33)
MCHC RBC AUTO-ENTMCNC: 35 G/DL (ref 31.5–36.5)
MCV RBC AUTO: 86 FL (ref 78–100)
MONOCYTES # BLD AUTO: 0.7 10E9/L (ref 0–1.3)
MONOCYTES NFR BLD AUTO: 10.6 %
MUCOUS THREADS #/AREA URNS LPF: PRESENT /LPF
NEUTROPHILS # BLD AUTO: 4.3 10E9/L (ref 1.6–8.3)
NEUTROPHILS NFR BLD AUTO: 62.5 %
NITRATE UR QL: NEGATIVE
NRBC # BLD AUTO: 0 10*3/UL
NRBC BLD AUTO-RTO: 0 /100
OSMOLALITY SERPL: 277 MMOL/KG (ref 280–295)
OSMOLALITY UR: 457 MMOL/KG (ref 100–1200)
PH UR STRIP: 5 PH (ref 4.7–8)
PLATELET # BLD AUTO: 286 10E9/L (ref 150–450)
POTASSIUM SERPL-SCNC: 4.3 MMOL/L (ref 3.4–5.3)
PROT SERPL-MCNC: 7.2 G/DL (ref 6.8–8.8)
RBC # BLD AUTO: 3.97 10E12/L (ref 3.8–5.2)
RBC #/AREA URNS AUTO: 1 /HPF (ref 0–2)
SODIUM SERPL-SCNC: 127 MMOL/L (ref 133–144)
SODIUM UR-SCNC: 31 MMOL/L
SOURCE: ABNORMAL
SP GR UR STRIP: 1.01 (ref 1–1.03)
SQUAMOUS #/AREA URNS AUTO: <1 /HPF (ref 0–1)
TSH SERPL DL<=0.005 MIU/L-ACNC: 1.84 MU/L (ref 0.4–4)
UROBILINOGEN UR STRIP-MCNC: NORMAL MG/DL (ref 0–2)
WBC # BLD AUTO: 6.9 10E9/L (ref 4–11)
WBC #/AREA URNS AUTO: 40 /HPF (ref 0–5)

## 2018-10-29 PROCEDURE — 83930 ASSAY OF BLOOD OSMOLALITY: CPT | Mod: ZL | Performed by: FAMILY MEDICINE

## 2018-10-29 PROCEDURE — 99214 OFFICE O/P EST MOD 30 MIN: CPT | Performed by: FAMILY MEDICINE

## 2018-10-29 PROCEDURE — G0463 HOSPITAL OUTPT CLINIC VISIT: HCPCS

## 2018-10-29 PROCEDURE — 84300 ASSAY OF URINE SODIUM: CPT | Mod: ZL | Performed by: FAMILY MEDICINE

## 2018-10-29 PROCEDURE — G0463 HOSPITAL OUTPT CLINIC VISIT: HCPCS | Mod: 25

## 2018-10-29 PROCEDURE — 81001 URINALYSIS AUTO W/SCOPE: CPT | Mod: ZL | Performed by: FAMILY MEDICINE

## 2018-10-29 PROCEDURE — 87088 URINE BACTERIA CULTURE: CPT | Mod: ZL | Performed by: FAMILY MEDICINE

## 2018-10-29 PROCEDURE — 36415 COLL VENOUS BLD VENIPUNCTURE: CPT | Mod: ZL | Performed by: FAMILY MEDICINE

## 2018-10-29 PROCEDURE — 90471 IMMUNIZATION ADMIN: CPT | Performed by: FAMILY MEDICINE

## 2018-10-29 PROCEDURE — 85025 COMPLETE CBC W/AUTO DIFF WBC: CPT | Mod: ZL | Performed by: FAMILY MEDICINE

## 2018-10-29 PROCEDURE — 90662 IIV NO PRSV INCREASED AG IM: CPT | Performed by: FAMILY MEDICINE

## 2018-10-29 PROCEDURE — 80053 COMPREHEN METABOLIC PANEL: CPT | Mod: ZL | Performed by: FAMILY MEDICINE

## 2018-10-29 PROCEDURE — 84443 ASSAY THYROID STIM HORMONE: CPT | Mod: ZL | Performed by: FAMILY MEDICINE

## 2018-10-29 PROCEDURE — 87186 SC STD MICRODIL/AGAR DIL: CPT | Mod: ZL | Performed by: FAMILY MEDICINE

## 2018-10-29 PROCEDURE — 87086 URINE CULTURE/COLONY COUNT: CPT | Mod: ZL | Performed by: FAMILY MEDICINE

## 2018-10-29 PROCEDURE — 83935 ASSAY OF URINE OSMOLALITY: CPT | Mod: ZL | Performed by: FAMILY MEDICINE

## 2018-10-29 ASSESSMENT — PATIENT HEALTH QUESTIONNAIRE - PHQ9: SUM OF ALL RESPONSES TO PHQ QUESTIONS 1-9: 2

## 2018-10-29 ASSESSMENT — PAIN SCALES - GENERAL: PAINLEVEL: NO PAIN (0)

## 2018-10-29 NOTE — MR AVS SNAPSHOT
After Visit Summary   10/29/2018    Lauren Barron    MRN: [id number]           Patient Information     Date Of Birth          1/17/1926        Visit Information        Provider Department      10/29/2018 11:00 AM Vandana Jones MD [facility]        Today's Diagnoses     Essential hypertension    -  1    Anxiety        Need for prophylactic vaccination and inoculation against influenza        Thrombocytopenia (H)        Sick sinus syndrome (H)        Other emphysema (H)        Episode of confusion           Follow-ups after your visit        Additional Services     HOME CARE NURSING REFERRAL       **Order classes of: [facility], [facility] and [facility] will route to the Home Care and Hospice Referral Pool.  Home Care or Hospice will then contact the patient to schedule their appointment.**    If you do not hear from Home Care and Hospice, or you would like to call to schedule, please call the referring place of service that your provider has listed below.  ______________________________________________________________________    Your provider has referred you to:     Extended Emergency Contact Information  Primary Emergency Contact: Raeann Carrion  Address: [address]           [place] [address] [facility]  Home Phone: [phone]  Relation: Daughter  Secondary Emergency Contact: Tushar Barron   Address: [address]           [place] [address] [facility]  Home Phone: [phone]  Mobile Phone: [phone]  Relation: Son    Patient Anticipated Discharge Date: unknown   RN, PT, HHA to begin 24 - 48 hours after discharge.  PLEASE EVALUATE AND TREAT (Evaluation timeline is 24 - 48 hrs. Please call if there is need for a variance to this timeline).    REASON FOR REFERRAL: Assessment & Treatment: RN set up medications and check BP weekly    ADDITIONAL SERVICES NEEDED: none    OTHER PERTINENT INFORMATION: Patient was last seen by provider on 10/29/2018   for sick  sinus syndrome, emphysema, thrombocytopenia.    Current Outpatient Prescriptions:  acetaminophen (TYLENOL) 500 MG tablet, Take 2 tablet (1000mg) by oral route every 6 hours as needed, Disp: 100 tablet, Rfl: 2  amLODIPine (NORVASC) 5 MG tablet, TAKE 1 TABLET BY MOUTH ONCE DAILY., Disp: 90 tablet, Rfl: 0  benazepril (LOTENSIN) 40 MG tablet, TAKE (1) TABLET BY MOUTH TWICE A DAY., Disp: 180 tablet, Rfl: 1  calcium 1500 MG TABS, Take 1 tablet by mouth daily, Disp: 100 tablet, Rfl: 3  Cholecalciferol (VITAMIN D) 1000 UNITS capsule, Take 1 capsule by mouth daily, Disp: 90 capsule, Rfl: 3  isosorbide mononitrate (IMDUR) 30 MG 24 hr tablet, TAKE ONE TABLET BY MOUTH AT BEDTIME., Disp: 90 tablet, Rfl: 1  LORazepam (ATIVAN) 0.5 MG tablet, TAKE 1 TABLET BY MOUTH EVERY 8 HOURS AS NEEDED FOR ANXIETY, Disp: 30 tablet, Rfl: 0  Multiple vitamin TABS, Take 1 tablet by oral route every day with foodTake 1 tablet by oral route every day with food, Disp: 900 tablet, Rfl: 3  nitroglycerin (NITROSTAT) 0.4 MG sublingual tablet, Place 1 tablet (0.4mg) by sublingual route at the first sign of attack; may repeat ever 5 min until relief; if pain persists after 3 tablets in 15 minutes prompt medical attention is recommended. AS NEEDED, Disp: 25 tablet, Rfl: 3  STATIN NOT PRESCRIBED, INTENTIONAL,, Statin not prescribed intentionally due to Other patient declines (This option does not exclude patient from measure), Disp: 0 each, Rfl: 0  triamterene-hydrochlorothiazide (DYAZIDE) 37.5-25 MG per capsule, TAKE 1 CAPSULE BY MOUTH DAILY, Disp: 90 capsule, Rfl: 0  predniSONE (DELTASONE) 10 MG tablet, TAKE 1 TABLET BY MOUTH ONCE DAILY. (Patient not taking: Reported on 10/29/2018), Disp: 90 tablet, Rfl: 0      Patient Active Problem List:     Epistaxis, recurrent     Advance Care Planning     Hyperlipidemia     Essential hypertension     Transient global amnesia     GI bleed     ASCVD (arteriosclerotic cardiovascular disease)     Squamous cell skin cancer,  chiquita nugent     Allergic rhinitis     Meniere's disease     Diastolic dysfunction     Atherosclerosis of aorta (H)     Cystocele     Mammogram declined     Bradycardia     Aortic insufficiency     Mitral regurgitation     Osteoarthritis     Sick sinus syndrome (H)     Acute posthemorrhagic anemia     Coronary arteriosclerosis in native artery     Epistaxis     Gastrointestinal hemorrhage     Multiple-type hyperlipidemia     Presence of cardiac pacemaker     Thrombocytopenia (H)     Hiatal hernia     Splenic artery aneurysm (H)     Pulmonary fibrosis (H)     Benign essential hypertension     Pulmonary nodules     CKD (chronic kidney disease) stage 3, GFR 30-59 ml/min (H)     Other emphysema (H)      Documentation of Face to Face and Certification for Home Health Services    I certify that patient, Lauren Barron is under my care and that I, or a Nurse Practitioner or Physician's Assistant working with me, had a face-to-face encounter that meets the physician face-to-face encounter requirements with this patient on: 10/29/2018.    This encounter with the patient was in whole, or in part, for the following medical condition, which is the primary reason for Home Health Care: above diagnoses.    I certify that, based on my findings, the following services are medically necessary Home Health Services: Nursing    My clinical findings support the need for the above services because: Nurse is needed: To provide assessment and oversight required in the home to assure adherence to the medical plan due to: difficulty getting out..    Further, I certify that my clinical findings support that this patient is homebound (i.e. absences from home require considerable and taxing effort and are for medical reasons or Buddhism services or infrequently or of short duration when for other reasons) because: Requires assistance of another person or specialized equipment to access medical services because patient:  Goes out with daughters  when needed.    Based on the above findings, I certify that this patient is confined to the home and needs intermittent skilled nursing care, physical therapy and/or speech therapy.  The patient is under my care, and I have initiated the establishment of the plan of care.  This patient will be followed by a physician who will periodically review the plan of care.    Physician/Provider to provide follow up care: Vandana Jones    [facility] certified Physician at time of discharge: Vandana Jones MD      Please be aware that coverage of these services is subject to the terms and limitations of your health insurance plan.  Call member services at your health plan with any benefit or coverage questions.                  Your next 10 appointments already scheduled     Nov 26, 2018  9:00 AM CST   (Arrive by 8:45 AM)   PHYSICAL with Vandana Jones MD   [facility] )    [address]  [facility] [address]   [phone]              Who to contact     If you have questions or need follow up information about today's clinic visit or your schedule please contact [facility] directly at [phone].  Normal or non-critical lab and imaging results will be communicated to you by MyChart, letter or phone within 4 business days after the clinic has received the results. If you do not hear from us within 7 days, please contact the clinic through MyChart or phone. If you have a critical or abnormal lab result, we will notify you by phone as soon as possible.  Submit refill requests through [employer] or call your pharmacy and they will forward the refill request to us. Please allow 3 business days for your refill to be completed.          Additional Information About Your Visit        [facility] [employer] Information     [employer] lets you send messages to your doctor, view your test results, renew your prescriptions, schedule appointments and more. To sign up, go  "to [email or web address] . Click on \"Log in\" on the left side of the screen, which will take you to the Welcome page. Then click on \"Sign up Now\" on the right side of the page.     You will be asked to enter the access code listed below, as well as some personal information. Please follow the directions to create your username and password.     Your access code is: [email or web address]  Expires: 2019 11:42 AM     Your access code will  in 90 days. If you need help or a new code, please call your [place] clinic or [phone].        Care EveryWhere ID     This is your Care EveryWhere ID. This could be used by other organizations to access your [place] medical records  [id number]        Your Vitals Were     Pulse Pulse Oximetry Breastfeeding? BMI (Body Mass Index)          60 95% No 22.26 kg/m2         Blood Pressure from Last 3 Encounters:   10/29/18 126/74   18 120/54   18 114/68    Weight from Last 3 Encounters:   10/29/18 114 lb (51.7 kg)   18 115 lb (52.2 kg)   18 115 lb (52.2 kg)              We Performed the Following     CBC with platelets and differential     Comprehensive metabolic panel     HC FLU VACCINE, INCREASED ANTIGEN, PRESV FREE     HOME CARE NURSING REFERRAL     UA reflex to Microscopic and Culture     Vaccine Administration, Initial [53016]        Primary Care Provider Office Phone # Fax #    Vandana Jones [phone]       [address] [place] [address]        Equal Access to Services     [facility] Hospital[facility]LENA : Hadii monty arevaloo Sokerwin, waaxda luqadaha, qaybta kaalmamarilu funez . So [facility] [phone].    ATENCIÓN: Si habla español, tiene a leon disposición servicios gratuitos de asistencia lingüística. Llame al [phone].    We comply with applicable federal civil rights laws and [place] laws. We do not discriminate on the basis of race, color, national origin, age, disability, sex, sexual " orientation, or gender identity.            Thank you!     Thank you for choosing [facility]  for your care. Our goal is always to provide you with excellent care. Hearing back from our patients is one way we can continue to improve our services. Please take a few minutes to complete the written survey that you may receive in the mail after your visit with us. Thank you!             Your Updated Medication List - Protect others around you: Learn how to safely use, store and throw away your medicines at www.disposemymeds.org.          This list is accurate as of 10/29/18 11:42 AM.  Always use your most recent med list.                   Brand Name Dispense Instructions for use Diagnosis    acetaminophen 500 MG tablet    TYLENOL    100 tablet    Take 2 tablet (1000mg) by oral route every 6 hours as needed    Other unknown and unspecified cause of morbidity or mortality       amLODIPine 5 MG tablet    NORVASC    90 tablet    TAKE 1 TABLET BY MOUTH ONCE DAILY.    Essential hypertension with goal blood pressure less than 140/90       benazepril 40 MG tablet    LOTENSIN    180 tablet    TAKE (1) TABLET BY MOUTH TWICE A DAY.    Essential hypertension with goal blood pressure less than 140/90       calcium 1500 MG Tabs     100 tablet    Take 1 tablet by mouth daily    Healthcare maintenance       isosorbide mononitrate 30 MG 24 hr tablet    IMDUR    90 tablet    TAKE ONE TABLET BY MOUTH AT BEDTIME.    Chronic ischemic heart disease       LORazepam 0.5 MG tablet    ATIVAN    30 tablet    TAKE 1 TABLET BY MOUTH EVERY 8 HOURS AS NEEDED FOR ANXIETY    Anxiety       Multiple vitamin Tabs     900 tablet    Take 1 tablet by oral route every day with foodTake 1 tablet by oral route every day with food    Health care maintenance       nitroGLYcerin 0.4 MG sublingual tablet    NITROSTAT    25 tablet    Place 1 tablet (0.4mg) by sublingual route at the first sign of attack; may repeat ever 5 min until relief; if  pain persists after 3 tablets in 15 minutes prompt medical attention is recommended. AS NEEDED    Chronic ischemic heart disease, unspecified       predniSONE 10 MG tablet    DELTASONE    90 tablet    TAKE 1 TABLET BY MOUTH ONCE DAILY.    Pulmonary fibrosis (H)       STATIN NOT PRESCRIBED (INTENTIONAL)     0 each    Statin not prescribed intentionally due to Other patient declines (This option does not exclude patient from measure)    Essential hypertension with goal blood pressure less than 140/90       triamterene-hydrochlorothiazide 37.5-25 MG per capsule    DYAZIDE    90 capsule    TAKE 1 CAPSULE BY MOUTH DAILY    Benign essential hypertension       vitamin D 1000 units capsule     90 capsule    Take 1 capsule by mouth daily    Health care maintenance

## 2018-10-29 NOTE — NURSING NOTE
Chief Complaint   Patient presents with     Hypertension     Anxiety     Flu Shot       Initial /74  Pulse 60  Wt 114 lb (51.7 kg)  SpO2 95%  Breastfeeding? No  BMI 22.26 kg/m2 Estimated body mass index is 22.26 kg/(m^2) as calculated from the following:    Height as of 8/14/18: 5' (1.524 m).    Weight as of this encounter: 114 lb (51.7 kg).  Medication Reconciliation: complete    Lucero Mckeon LPN

## 2018-10-30 ENCOUNTER — TELEPHONE (OUTPATIENT)
Dept: FAMILY MEDICINE | Facility: OTHER | Age: 83
End: 2018-10-30

## 2018-10-30 NOTE — TELEPHONE ENCOUNTER
8:53 AM    Reason for Call: Phone Call    Description: Patient called in saying that she had the nurse call her yesterday with her results.  The patient just called & asked if her daughter, Rajani Zelaya @ [phone] could receive the phone call because the patient doesn't understand what the nurse is saying.    Was an appointment offered for this call? No  If yes : Appointment type              Date    Preferred method for responding to this message: Telephone Call - Rajani Zelaya  What is your phone number ?[phone].    If we cannot reach you directly, may we leave a detailed response at the number you provided? Yes    Can this message wait until your PCP/provider returns, if available today? Not applicable, PCP is in today    Keshia Calvin

## 2018-10-31 LAB
BACTERIA SPEC CULT: ABNORMAL
SPECIMEN SOURCE: ABNORMAL

## 2018-10-31 RX ORDER — LEVOFLOXACIN 500 MG/1
500 TABLET, FILM COATED ORAL DAILY
Qty: 5 TABLET | Refills: 0 | Status: SHIPPED | OUTPATIENT
Start: 2018-10-31 | End: 2018-11-05

## 2018-11-06 ENCOUNTER — HOSPITAL ENCOUNTER (OUTPATIENT)
Dept: CT IMAGING | Facility: HOSPITAL | Age: 83
Discharge: HOME | End: 2018-11-06
Attending: FAMILY MEDICINE | Admitting: FAMILY MEDICINE
Payer: COMMERCIAL

## 2018-11-06 DIAGNOSIS — R91.8 PULMONARY NODULES: ICD-10-CM

## 2018-11-06 DIAGNOSIS — E87.1 HYPONATREMIA: ICD-10-CM

## 2018-11-06 PROCEDURE — 71250 CT THORAX DX C-: CPT | Mod: TC

## 2019-04-23 DIAGNOSIS — I10 ESSENTIAL HYPERTENSION WITH GOAL BLOOD PRESSURE LESS THAN 140/90: ICD-10-CM

## 2019-04-24 NOTE — TELEPHONE ENCOUNTER
Norvasc  Last Written Prescription Date: 1/22/19  Last Fill Quantity: 90 # of Refills: 0  Last Office Visit: 11/26/18

## 2019-04-25 RX ORDER — AMLODIPINE BESYLATE 5 MG/1
TABLET ORAL
Qty: 90 TABLET | Refills: 0 | Status: SHIPPED | OUTPATIENT
Start: 2019-04-25 | End: 2019-05-21

## 2019-05-17 ENCOUNTER — TELEPHONE (OUTPATIENT)
Dept: FAMILY MEDICINE | Facility: OTHER | Age: 84
End: 2019-05-17

## 2019-05-17 DIAGNOSIS — I10 BENIGN ESSENTIAL HYPERTENSION: ICD-10-CM

## 2019-05-17 DIAGNOSIS — F41.9 ANXIETY: ICD-10-CM

## 2019-05-17 RX ORDER — TRIAMTERENE AND HYDROCHLOROTHIAZIDE 37.5; 25 MG/1; MG/1
1 CAPSULE ORAL DAILY
Qty: 30 CAPSULE | Refills: 0 | Status: SHIPPED | OUTPATIENT
Start: 2019-05-17 | End: 2019-05-21

## 2019-05-17 NOTE — TELEPHONE ENCOUNTER
12:53 PM    Reason for Call: Phone Call    Description: Phone call from daughter (ok by Lauren to speak with) to notify that she has not been taking her Imdur since January. States she has become more increasingly agitated with things and would like an appointment. Appointment scheduled for 5-21-19.    Was an appointment offered for this call? Yes  If yes : Appointment type- Short/med discuss              Date- 5-21-19    Preferred method for responding to this message: Telephone Call  What is your phone number ? [phone]      If we cannot reach you directly, may we leave a detailed response at the number you provided? Yes    Can this message wait until your PCP/provider returns, if available today? YES,     Pema Chau, RN

## 2019-05-20 RX ORDER — LORAZEPAM 0.5 MG/1
TABLET ORAL
Qty: 30 TABLET | Refills: 0 | Status: SHIPPED | OUTPATIENT
Start: 2019-05-20 | End: 2019-05-21

## 2019-05-20 RX ORDER — TRIAMTERENE AND HYDROCHLOROTHIAZIDE 37.5; 25 MG/1; MG/1
CAPSULE ORAL
Qty: 90 CAPSULE | Refills: 0 | OUTPATIENT
Start: 2019-05-20

## 2019-05-20 RX ORDER — LORAZEPAM 0.5 MG/1
TABLET ORAL
Qty: 30 TABLET | Refills: 0 | OUTPATIENT
Start: 2019-05-20

## 2019-05-20 NOTE — TELEPHONE ENCOUNTER
LORazepam (ATIVAN) 0.5 MG tablet      Last Written Prescription Date:  10-9-18  Last Fill Quantity: 30,   # refills: 0  Last Office Visit: 11-26-18  Future Office visit:    Next 5 appointments (look out 90 days)    May 21, 2019 11:00 AM CDT  (Arrive by 10:45 AM)  SHORT with Vandana Jones MD  [facility] [address]  [place] [address]  [phone]   Jul 22, 2019  1:30 PM CDT  (Arrive by 1:15 PM)  PHYSICAL with Vandana Jones MD  [facility] ([facility] [address]  [place] [address]  [phone]           Routing refill request to provider for review/approval because:  Drug not on the [facility], [facility] or [facility] refill protocol or controlled substance

## 2019-05-21 ENCOUNTER — OFFICE VISIT (OUTPATIENT)
Dept: FAMILY MEDICINE | Facility: OTHER | Age: 84
End: 2019-05-21
Attending: FAMILY MEDICINE
Payer: COMMERCIAL

## 2019-05-21 VITALS
WEIGHT: 117 LBS | BODY MASS INDEX: 22.97 KG/M2 | OXYGEN SATURATION: 97 % | SYSTOLIC BLOOD PRESSURE: 152 MMHG | DIASTOLIC BLOOD PRESSURE: 60 MMHG | RESPIRATION RATE: 20 BRPM | HEART RATE: 60 BPM | HEIGHT: 60 IN | TEMPERATURE: 97.9 F

## 2019-05-21 DIAGNOSIS — F41.9 ANXIETY: ICD-10-CM

## 2019-05-21 DIAGNOSIS — I49.5 SICK SINUS SYNDROME (H): ICD-10-CM

## 2019-05-21 DIAGNOSIS — I10 BENIGN ESSENTIAL HYPERTENSION: ICD-10-CM

## 2019-05-21 DIAGNOSIS — I25.9 CHRONIC ISCHEMIC HEART DISEASE: ICD-10-CM

## 2019-05-21 DIAGNOSIS — I25.9 CHRONIC ISCHEMIC HEART DISEASE, UNSPECIFIED: ICD-10-CM

## 2019-05-21 DIAGNOSIS — J43.8 OTHER EMPHYSEMA (H): ICD-10-CM

## 2019-05-21 DIAGNOSIS — I72.8 SPLENIC ARTERY ANEURYSM (H): ICD-10-CM

## 2019-05-21 DIAGNOSIS — D69.6 THROMBOCYTOPENIA (H): ICD-10-CM

## 2019-05-21 DIAGNOSIS — R46.89 BEHAVIORAL CHANGE: Primary | ICD-10-CM

## 2019-05-21 PROCEDURE — 99213 OFFICE O/P EST LOW 20 MIN: CPT | Performed by: FAMILY MEDICINE

## 2019-05-21 PROCEDURE — G0463 HOSPITAL OUTPT CLINIC VISIT: HCPCS

## 2019-05-21 RX ORDER — TRIAMTERENE AND HYDROCHLOROTHIAZIDE 37.5; 25 MG/1; MG/1
1 CAPSULE ORAL DAILY
Qty: 90 CAPSULE | Refills: 3 | Status: ON HOLD | OUTPATIENT
Start: 2019-05-21 | End: 2019-10-17

## 2019-05-21 RX ORDER — LORAZEPAM 0.5 MG/1
TABLET ORAL
Qty: 30 TABLET | Refills: 0 | Status: SHIPPED | OUTPATIENT
Start: 2019-05-21 | End: 2019-10-14

## 2019-05-21 RX ORDER — NITROGLYCERIN 0.4 MG/1
TABLET SUBLINGUAL
Qty: 25 TABLET | Refills: 3 | Status: SHIPPED | OUTPATIENT
Start: 2019-05-21 | End: 2021-01-01

## 2019-05-21 RX ORDER — ISOSORBIDE MONONITRATE 30 MG/1
TABLET, EXTENDED RELEASE ORAL
Qty: 90 TABLET | Refills: 3 | Status: SHIPPED | OUTPATIENT
Start: 2019-05-21 | End: 2020-08-18

## 2019-05-21 ASSESSMENT — MIFFLIN-ST. JEOR: SCORE: 857.21

## 2019-05-21 NOTE — PROGRESS NOTES
Subjective     Lauren Barron is a 89 year old female who presents to clinic today for the following health issues:    HPI   Medication review/Agitation       Duration: worse over the last month    Description (location/character/radiation): seemingly more agitated, daughter states hasn't taken her Imdur for her heart since January. Patient has not been taking Ativan     Intensity:  0/10    Accompanying signs and symptoms: None     History (similar episodes/previous evaluation): None    Precipitating or alleviating factors: None    Therapies tried and outcome: None     Lauren is accompanied by her daughters today and isn't happy she is here. She stopped all of her medications except her dyazide. She is feeling well except for right shoulder pain and chronic abdominal discomfort related to a large hiatal hernia. Her daughters are concerned that she has become more agitated and Ativan was helpful. She had also been on prednisone for emphysema which she stopped  {  Patient Active Problem List   Diagnosis     Epistaxis, recurrent     Advance Care Planning     Hyperlipidemia     Essential hypertension     Transient global amnesia     GI bleed     ASCVD (arteriosclerotic cardiovascular disease)     Squamous cell skin cancer, ala nasi     Allergic rhinitis     Meniere's disease     Diastolic dysfunction     Atherosclerosis of aorta (H)     Cystocele     Mammogram declined     Bradycardia     Aortic insufficiency     Mitral regurgitation     Osteoarthritis     Sick sinus syndrome (H)     Acute posthemorrhagic anemia     Coronary arteriosclerosis in native artery     Epistaxis     Gastrointestinal hemorrhage     Multiple-type hyperlipidemia     Presence of cardiac pacemaker     Thrombocytopenia (H)     Hiatal hernia     Splenic artery aneurysm (H)     Pulmonary fibrosis (H)     Benign essential hypertension     Pulmonary nodules     CKD (chronic kidney disease) stage 3, GFR 30-59 ml/min (H)     Other emphysema (H)      Past Surgical History:   Procedure Laterality Date     APPENDECTOMY       ARTHROSCOPY KNEE      RIGHT - Osteoarthritis     C TOTAL KNEE ARTHROPLASTY      LEFT - Osteoarthritis - Ramírez Ruvalcaba TOTAL KNEE ARTHROPLASTY      RIGHT - Osteoarthritis - [facility] Ruvalcaba     CATARACT EXTRACTION and LENS IMPLANTATION  2010    BILATERAL     COLONOSCOPY  2012     Colonoscopy with Polypectomy  2012    GI BLEED - DR. DEL CASTILLO             REMOVAL OF OVARIAN CYST(S)       HYSTERECTOMY, YANG      Metorrhagia     IMPLANT PACEMAKER  2016     Left Subclavian Line, Triple Lumen      Gastric Ulcer, Dieulfoy lesion, hemostatic clips placed - Massive GI Bleed - [facility]     RELEASE CARPAL TUNNEL      RIGHT - Carpal Tunnel Syndrome     REPAIR BLADDER         Social History     Tobacco Use     Smoking status: Never Smoker     Smokeless tobacco: Never Used   Substance Use Topics     Alcohol use: No     Alcohol/week: 0.0 oz     Family History   Problem Relation Age of Onset     Cancer Brother         Pancreatic     Cancer Brother         Prostate     Cancer Sister         Breast     Hypertension Brother      Hypertension Father      Hypertension Sister      Hypertension Mother      Aneurysm Daughter 68        cerebral, sudden death     Osteoporosis Other      Thyroid Disease No family hx of      Diabetes No family hx of          Current Outpatient Medications   Medication Sig Dispense Refill     calcium 1500 MG TABS Take 1 tablet by mouth daily 100 tablet 3     isosorbide mononitrate (IMDUR) 30 MG 24 hr tablet TAKE ONE TABLET BY MOUTH AT BEDTIME. 90 tablet 3     LORazepam (ATIVAN) 0.5 MG tablet TAKE 1 TABLET BY MOUTH EVERY 8 HOURS AS NEEDED FOR ANXIETY 30 tablet 0     Multiple vitamin TABS Take 1 tablet by oral route every day with foodTake 1 tablet by oral route every day with food 900 tablet 3     nitroGLYcerin (NITROSTAT) 0.4 MG sublingual tablet Place 1 tablet (0.4mg) by sublingual  route at the first sign of attack; may repeat ever 5 min until relief; if pain persists after 3 tablets in 15 minutes prompt medical attention is recommended. AS NEEDED 25 tablet 3     STATIN NOT PRESCRIBED, INTENTIONAL, Statin not prescribed intentionally due to Other patient declines (This option does not exclude patient from measure) 0 each 0     triamterene-HCTZ (DYAZIDE) 37.5-25 MG capsule Take 1 capsule by mouth daily 90 capsule 3     acetaminophen (TYLENOL) 500 MG tablet Take 2 tablet (1000mg) by oral route every 6 hours as needed (Patient not taking: Reported on 5/21/2019) 100 tablet 2     Cholecalciferol (VITAMIN D) 1000 UNITS capsule Take 1 capsule by mouth daily (Patient not taking: Reported on 5/21/2019) 90 capsule 3     Allergies   Allergen Reactions     [place] Juice Other (See Comments), Nausea and GI Disturbance     Vertigo     Food      Oranges.     Naprosyn [Naproxen]      Incontinence       Niacin Swelling     BP Readings from Last 3 Encounters:   05/21/19 152/60   10/29/18 126/74   08/14/18 120/54    Wt Readings from Last 3 Encounters:   05/21/19 53.1 kg (117 lb)   10/29/18 51.7 kg (114 lb)   08/14/18 52.2 kg (115 lb)                    Reviewed and updated as needed this visit by Provider         Review of Systems   ROS COMP: Constitutional, HEENT, cardiovascular, pulmonary, gi and gu systems are negative, except as otherwise noted.      Objective    /60 (BP Location: Left arm, Patient Position: Sitting, Cuff Size: Adult Regular)   Pulse 60   Temp 97.9  F (36.6  C) (Tympanic)   Resp 20   Ht 1.524 m (5')   Wt 53.1 kg (117 lb)   SpO2 97%   BMI 22.85 kg/m    There is no height or weight on file to calculate BMI.  Physical Exam   GENERAL APPEARANCE: healthy, alert and no distress  NECK: no adenopathy, no asymmetry, masses, or scars and thyroid normal to palpation  RESP: lungs clear to auscultation - no rales, rhonchi or wheezes  CV: regular rates and rhythm, normal S1 S2, no S3 or S4 and  grade 1/6 DIVYA murmur heard best over the RSB  MS: extremities normal- no gross deformities noted and no edema  SKIN: no suspicious lesions or rashes  NEURO: Normal strength and tone, mentation intact and speech normal  PSYCH: mentation appears normal and worried            Assessment & Plan     1. Behavioral change  More irritable. Stopped most of her medications but is feeling well. She is agreeable to start a few back. Will keep these limited as her BP is fairly well controlled    2. Chronic ischemic heart disease, unspecified  Renewed for her to have on hand  - nitroGLYcerin (NITROSTAT) 0.4 MG sublingual tablet; Place 1 tablet (0.4mg) by sublingual route at the first sign of attack; may repeat ever 5 min until relief; if pain persists after 3 tablets in 15 minutes prompt medical attention is recommended. AS NEEDED  Dispense: 25 tablet; Refill: 3    3. Chronic ischemic heart disease  Restart this for her.   - isosorbide mononitrate (IMDUR) 30 MG 24 hr tablet; TAKE ONE TABLET BY MOUTH AT BEDTIME.  Dispense: 90 tablet; Refill: 3    4. Benign essential hypertension  Continue   - triamterene-HCTZ (DYAZIDE) 37.5-25 MG capsule; Take 1 capsule by mouth daily  Dispense: 90 capsule; Refill: 3    5. Anxiety  Restart this which has helped her anxiety in the past  - LORazepam (ATIVAN) 0.5 MG tablet; TAKE 1 TABLET BY MOUTH EVERY 8 HOURS AS NEEDED FOR ANXIETY  Dispense: 30 tablet; Refill: 0    6. Other emphysema (H)  Now off prednisone, doing well     7. Splenic artery aneurysm (H)  Stable     8. Sick sinus syndrome (H)  With pacemaker, stable     9. Thrombocytopenia (H)  Will defer labs to her next visit as she is not happy to be here today         Patient Instructions   Continue ativan one tablet at night      Return in about 1 month (around 6/18/2019) for BP Recheck, anxiety.    Vandana Jones MD  [facility]

## 2019-05-21 NOTE — NURSING NOTE
Chief Complaint   Patient presents with     Recheck Medication       Initial /60 (BP Location: Left arm, Patient Position: Sitting, Cuff Size: Adult Regular)   Pulse 60   Temp 97.9  F (36.6  C) (Tympanic)   Resp 20   Ht 1.524 m (5')   Wt 53.1 kg (117 lb)   SpO2 97%   BMI 22.85 kg/m   Estimated body mass index is 22.85 kg/m  as calculated from the following:    Height as of this encounter: 1.524 m (5').    Weight as of this encounter: 53.1 kg (117 lb).  Medication Reconciliation: complete    Leonela Farmer LPN

## 2019-06-12 NOTE — PROGRESS NOTES
Subjective     Lauren Barron is a 89 year old female who presents to clinic today for the following health issues:    HPI   Hypertension Follow-up      Do you check your blood pressure regularly outside of the clinic? Yes     Are you following a low salt diet? No    Are your blood pressures ever more than 140 on the top number (systolic) OR more   than 90 on the bottom number (diastolic), for example 140/90? No     We restarted her isosorbide and dyazide which she is tolerating well.       Anxiety Follow-Up    How are you doing with your anxiety since your last visit? Improved     Are you having other symptoms that might be associated with anxiety? No    Have you had a significant life event? No     Are you feeling depressed? No    Do you have any concerns with your use of alcohol or other drugs? No  She takes her Klonopin occasionally as needed. She is accompanied by her daughter today    Social History     Tobacco Use     Smoking status: Never Smoker     Smokeless tobacco: Never Used   Substance Use Topics     Alcohol use: No     Alcohol/week: 0.0 oz     Drug use: No     [place]-7 SCORE 11/20/2017 5/24/2018   Total Score 0 0     PHQ 11/20/2017 5/24/2018 10/29/2018   PHQ-9 Total Score 1 0 2   Q9: Thoughts of better off dead/self-harm past 2 weeks Not at all Not at all Not at all           Amount of exercise or physical activity: None    Problems taking medications regularly: No    Medication side effects: none    Diet: regular (no restrictions)          Patient Active Problem List   Diagnosis     Epistaxis, recurrent     Advance Care Planning     Hyperlipidemia     Essential hypertension     Transient global amnesia     GI bleed     ASCVD (arteriosclerotic cardiovascular disease)     Squamous cell skin cancer, ala nasi     Allergic rhinitis     Meniere's disease     Diastolic dysfunction     Atherosclerosis of aorta (H)     Cystocele     Mammogram declined     Bradycardia     Aortic insufficiency     Mitral  regurgitation     Osteoarthritis     Sick sinus syndrome (H)     Acute posthemorrhagic anemia     Coronary arteriosclerosis in native artery     Epistaxis     Gastrointestinal hemorrhage     Multiple-type hyperlipidemia     Presence of cardiac pacemaker     Thrombocytopenia (H)     Hiatal hernia     Splenic artery aneurysm (H)     Pulmonary fibrosis (H)     Benign essential hypertension     Pulmonary nodules     CKD (chronic kidney disease) stage 3, GFR 30-59 ml/min (H)     Other emphysema (H)     Past Surgical History:   Procedure Laterality Date     APPENDECTOMY       ARTHROSCOPY KNEE      RIGHT - Osteoarthritis     C TOTAL KNEE ARTHROPLASTY      LEFT - Osteoarthritis - [facility] Ruvalcaba TOTAL KNEE ARTHROPLASTY      RIGHT - Osteoarthritis - [facility] Ruvalcaba     CATARACT EXTRACTION and LENS IMPLANTATION      BILATERAL     COLONOSCOPY  2012     Colonoscopy with Polypectomy  2012    GI BLEED - DR. DEL CASTILLO            HC REMOVAL OF OVARIAN CYST(S)       HYSTERECTOMY, YANG      Metorrhagia     IMPLANT PACEMAKER  2016     Left Subclavian Line, Triple Lumen      Gastric Ulcer, Dieulfoy lesion, hemostatic clips placed - Massive GI Bleed - Transferred to [facility]     RELEASE CARPAL TUNNEL      RIGHT - Carpal Tunnel Syndrome     REPAIR BLADDER         Social History     Tobacco Use     Smoking status: Never Smoker     Smokeless tobacco: Never Used   Substance Use Topics     Alcohol use: No     Alcohol/week: 0.0 oz     Family History   Problem Relation Age of Onset     Cancer Brother         Pancreatic     Cancer Brother         Prostate     Cancer Sister         Breast     Hypertension Brother      Hypertension Father      Hypertension Sister      Hypertension Mother      Aneurysm Daughter 68        cerebral, sudden death     Osteoporosis Other      Thyroid Disease No family hx of      Diabetes No family hx of          Current Outpatient Medications   Medication Sig Dispense Refill      calcium 1500 MG TABS Take 1 tablet by mouth daily 100 tablet 3     isosorbide mononitrate (IMDUR) 30 MG 24 hr tablet TAKE ONE TABLET BY MOUTH AT BEDTIME. 90 tablet 3     LORazepam (ATIVAN) 0.5 MG tablet TAKE 1 TABLET BY MOUTH EVERY 8 HOURS AS NEEDED FOR ANXIETY 30 tablet 0     Multiple vitamin TABS Take 1 tablet by oral route every day with foodTake 1 tablet by oral route every day with food 900 tablet 3     nitroGLYcerin (NITROSTAT) 0.4 MG sublingual tablet Place 1 tablet (0.4mg) by sublingual route at the first sign of attack; may repeat ever 5 min until relief; if pain persists after 3 tablets in 15 minutes prompt medical attention is recommended. AS NEEDED 25 tablet 3     STATIN NOT PRESCRIBED, INTENTIONAL, Statin not prescribed intentionally due to Other patient declines (This option does not exclude patient from measure) 0 each 0     triamterene-HCTZ (DYAZIDE) 37.5-25 MG capsule Take 1 capsule by mouth daily 90 capsule 3     acetaminophen (TYLENOL) 500 MG tablet Take 2 tablet (1000mg) by oral route every 6 hours as needed (Patient not taking: Reported on 5/21/2019) 100 tablet 2     Cholecalciferol (VITAMIN D) 1000 UNITS capsule Take 1 capsule by mouth daily (Patient not taking: Reported on 5/21/2019) 90 capsule 3     Allergies   Allergen Reactions     [place] Juice Other (See Comments), Nausea and GI Disturbance     Vertigo     Food      Oranges.     Naprosyn [Naproxen]      Incontinence       Niacin Swelling     BP Readings from Last 3 Encounters:   06/25/19 124/58   05/21/19 152/60   10/29/18 126/74    Wt Readings from Last 3 Encounters:   06/25/19 53.5 kg (118 lb)   05/21/19 53.1 kg (117 lb)   10/29/18 51.7 kg (114 lb)                      Reviewed and updated as needed this visit by Provider         Review of Systems   ROS COMP: Constitutional, HEENT, cardiovascular, pulmonary, gi and gu systems are negative, except as otherwise noted.      Objective    /58   Pulse 78   Resp 19   Ht 1.524 m (5')    Wt 53.5 kg (118 lb)   SpO2 98%   BMI 23.05 kg/m    Body mass index is 23.05 kg/m .  Physical Exam   GENERAL APPEARANCE: healthy, alert and no distress  NECK: no adenopathy, no asymmetry, masses, or scars and thyroid normal to palpation  RESP: lungs clear to auscultation - no rales, rhonchi or wheezes  CV: regular rates and rhythm, normal S1 S2, no S3 or S4 and no murmur, click or rub  MS: extremities normal- no gross deformities noted, no edema  NEURO: Normal strength and tone, mentation intact and speech normal  PSYCH: alert and oriented X 3            Assessment & Plan     1. Essential hypertension  Good control. Will check labs today,   - Basic metabolic panel; Standing  - Basic metabolic panel    2. Anxiety  Good control on current medication regimen              Return in about 4 weeks (around 7/23/2019) for Previously scheduled appointment.    Vandana Jones MD  [facility]

## 2019-06-25 ENCOUNTER — OFFICE VISIT (OUTPATIENT)
Dept: FAMILY MEDICINE | Facility: OTHER | Age: 84
End: 2019-06-25
Attending: FAMILY MEDICINE
Payer: COMMERCIAL

## 2019-06-25 VITALS
OXYGEN SATURATION: 98 % | WEIGHT: 118 LBS | DIASTOLIC BLOOD PRESSURE: 58 MMHG | BODY MASS INDEX: 23.16 KG/M2 | SYSTOLIC BLOOD PRESSURE: 124 MMHG | HEIGHT: 60 IN | HEART RATE: 78 BPM | RESPIRATION RATE: 19 BRPM

## 2019-06-25 DIAGNOSIS — I10 ESSENTIAL HYPERTENSION: Primary | ICD-10-CM

## 2019-06-25 DIAGNOSIS — F41.9 ANXIETY: ICD-10-CM

## 2019-06-25 LAB
ANION GAP SERPL CALCULATED.3IONS-SCNC: 6 MMOL/L (ref 3–14)
BUN SERPL-MCNC: 36 MG/DL (ref 7–30)
CALCIUM SERPL-MCNC: 9.4 MG/DL (ref 8.5–10.1)
CHLORIDE SERPL-SCNC: 104 MMOL/L (ref 94–109)
CO2 SERPL-SCNC: 27 MMOL/L (ref 20–32)
CREAT SERPL-MCNC: 1.11 MG/DL (ref 0.52–1.04)
GFR SERPL CREATININE-BSD FRML MDRD: 43 ML/MIN/{1.73_M2}
GLUCOSE SERPL-MCNC: 151 MG/DL (ref 70–99)
POTASSIUM SERPL-SCNC: 4.3 MMOL/L (ref 3.4–5.3)
SODIUM SERPL-SCNC: 137 MMOL/L (ref 133–144)

## 2019-06-25 PROCEDURE — 80048 BASIC METABOLIC PNL TOTAL CA: CPT | Mod: ZL | Performed by: FAMILY MEDICINE

## 2019-06-25 PROCEDURE — G0463 HOSPITAL OUTPT CLINIC VISIT: HCPCS

## 2019-06-25 PROCEDURE — 99213 OFFICE O/P EST LOW 20 MIN: CPT | Performed by: FAMILY MEDICINE

## 2019-06-25 PROCEDURE — 36415 COLL VENOUS BLD VENIPUNCTURE: CPT | Mod: ZL | Performed by: FAMILY MEDICINE

## 2019-06-25 ASSESSMENT — MIFFLIN-ST. JEOR: SCORE: 861.74

## 2019-06-25 ASSESSMENT — PAIN SCALES - GENERAL: PAINLEVEL: NO PAIN (0)

## 2019-06-25 NOTE — NURSING NOTE
Chief Complaint   Patient presents with     Hypertension     Anxiety       Initial /58   Pulse 78   Resp 19   Ht 1.524 m (5')   Wt 53.5 kg (118 lb)   SpO2 98%   BMI 23.05 kg/m   Estimated body mass index is 23.05 kg/m  as calculated from the following:    Height as of this encounter: 1.524 m (5').    Weight as of this encounter: 53.5 kg (118 lb).  Medication Reconciliation: complete

## 2019-07-08 NOTE — PATIENT INSTRUCTIONS
Preventive Health Recommendations    See your health care provider every year to    Review health changes.     Discuss preventive care.      Review your medicines if your doctor has prescribed any.      You no longer need a yearly Pap test unless you've had an abnormal Pap test in the past 10 years. If you have vaginal symptoms, such as bleeding or discharge, be sure to talk with your provider about a Pap test.      Every 1 to 2 years, have a mammogram.  If you are over 69, talk with your health care provider about whether or not you want to continue having screening mammograms.      Every 10 years, have a colonoscopy. Or, have a yearly FIT test (stool test). These exams will check for colon cancer.       Have a cholesterol test every 5 years, or more often if your doctor advises it.       Have a diabetes test (fasting glucose) every three years. If you are at risk for diabetes, you should have this test more often.       At age 65, have a bone density scan (DEXA) to check for osteoporosis (brittle bone disease).    Shots:    Get a flu shot each year.    Get a tetanus shot every 10 years.    Talk to your doctor about your pneumonia vaccines. There are now two you should receive - Pneumovax (PPSV 23) and Prevnar (PCV 13).    Talk to your pharmacist about the shingles vaccine.    Talk to your doctor about the hepatitis B vaccine.    Nutrition:     Eat at least 5 servings of fruits and vegetables each day.      Eat whole-grain bread, whole-wheat pasta and brown rice instead of white grains and rice.      Get adequate about Calcium and Vitamin D.     Lifestyle    Exercise at least 150 minutes a week (30 minutes a day, 5 days a week). This will help you control your weight and prevent disease.      Limit alcohol to one drink per day.      No smoking.       Wear sunscreen to prevent skin cancer.       See your dentist twice a year for an exam and cleaning.      See your eye doctor every 1 to 2 years to screen for  conditions such as glaucoma, macular degeneration, cataracts, etc.    Personalized Prevention Plan  You are due for the preventive services outlined below.  Your care team is available to assist you in scheduling these services.  If you have already completed any of these items, please share that information with your care team to update in your medical record.    Health Maintenance Due   Topic Date Due     COPD Action Plan  01/17/[birth year]26     INR CLINIC REFERRAL - yearly  [birth year]17/1927     Zoster (Shingles) Vaccine (1 of 2) 01/17/1976     Annual Wellness Visit  11/20/2018     Diptheria Tetanus Pertussis (DTAP/TDAP/TD) Vaccine (2 - Td) 12/04/2018

## 2019-07-08 NOTE — PROGRESS NOTES
"SUBJECTIVE:   Lauren Barron is a 89 year old female who presents for Preventive Visit.      Are you in the first 12 months of your Medicare Part B coverage?  No    Physical Health:    In general, how would you rate your overall physical health? good    Outside of work, how many days during the week do you exercise? 6-7 days/week    Outside of work, approximately how many minutes a day do you exercise?15-30 minutes    If you drink alcohol do you typically have >3 drinks per day or >7 drinks per week? No    Do you usually eat at least 4 servings of fruit and vegetables a day, include whole grains & fiber and avoid regularly eating high fat or \"junk\" foods? Yes    Do you have any problems taking medications regularly?  No    Do you have any side effects from medications? none    Needs assistance for the following daily activities: no assistance needed    Which of the following safety concerns are present in your home?  none identified     Hearing impairment: No    In the past 6 months, have you been bothered by leaking of urine? no    Mental Health:    In general, how would you rate your overall mental or emotional health? good  PHQ-2 Score:      Do you feel safe in your environment? Yes    Do you have a Health Care Directive? Yes: Advance Directive has been received and scanned.    Additional concerns to address?  No    Fall risk:  Fall Risk Assessment not completed.  click delete button to remove this line now  Cognitive Screening: not done    Do you have sleep apnea, excessive snoring or daytime drowsiness?: no            Reviewed and updated as needed this visit by clinical staff  Tobacco  Allergies  Meds  Med Hx  Surg Hx  Fam Hx  Soc Hx        Reviewed and updated as needed this visit by Provider        Social History     Tobacco Use     Smoking status: Never Smoker     Smokeless tobacco: Never Used   Substance Use Topics     Alcohol use: No     Alcohol/week: 0.0 oz                           Current " providers sharing in care for this patient include:   Patient Care Team:  Vandana Jones MD as PCP - General  Vandana Jones MD as Assigned PCP    The following health maintenance items are reviewed in Epic and correct as of today:  Health Maintenance   Topic Date Due     COPD ACTION PLAN  01/17/1926     OP ANNUAL INR REFERRAL  01/17/1927     ZOSTER IMMUNIZATION (1 of 2) 01/17/1976     MEDICARE ANNUAL WELLNESS VISIT  11/20/2018     DTAP/TDAP/TD IMMUNIZATION (2 - Td) 12/04/2018     INFLUENZA VACCINE (1) 09/01/2019     PHQ-9  10/29/2019     FALL RISK ASSESSMENT  05/21/2020     ADVANCE CARE PLANNING  08/31/2020     SPIROMETRY  Completed     IPV IMMUNIZATION  Aged Out     MENINGITIS IMMUNIZATION  Aged Out     BP Readings from Last 3 Encounters:   07/22/19 138/60   06/25/19 124/58   05/21/19 152/60    Wt Readings from Last 3 Encounters:   07/22/19 50.8 kg (112 lb)   06/25/19 53.5 kg (118 lb)   05/21/19 53.1 kg (117 lb)                  Patient Active Problem List   Diagnosis     Epistaxis, recurrent     Advance Care Planning     Hyperlipidemia     Essential hypertension     Transient global amnesia     GI bleed     ASCVD (arteriosclerotic cardiovascular disease)     Squamous cell skin cancer, ala nasi     Allergic rhinitis     Meniere's disease     Diastolic dysfunction     Atherosclerosis of aorta (H)     Cystocele     Mammogram declined     Bradycardia     Aortic insufficiency     Mitral regurgitation     Osteoarthritis     Sick sinus syndrome (H)     Acute posthemorrhagic anemia     Coronary arteriosclerosis in native artery     Epistaxis     Gastrointestinal hemorrhage     Multiple-type hyperlipidemia     Presence of cardiac pacemaker     Thrombocytopenia (H)     Hiatal hernia     Splenic artery aneurysm (H)     Pulmonary fibrosis (H)     Benign essential hypertension     Pulmonary nodules     CKD (chronic kidney disease) stage 3, GFR 30-59 ml/min (H)     Other emphysema (H)     Past Surgical History:   Procedure  Laterality Date     APPENDECTOMY       ARTHROSCOPY KNEE      RIGHT - Osteoarthritis     C TOTAL KNEE ARTHROPLASTY      LEFT - Osteoarthritis - [facility] TOTAL KNEE ARTHROPLASTY      RIGHT - Osteoarthritis - [facility]val[facility]     CATARACT EXTRACTION and LENS IMPLANTATION  2010    BILATERAL     COLONOSCOPY  2012     Colonoscopy with Polypectomy      GI BLEED - DR. DEL CASTILLO             REMOVAL OF OVARIAN CYST(S)       HYSTERECTOMY, YANG      Metorrhagia     IMPLANT PACEMAKER  2016     Left Subclavian Line, Triple Lumen      Gastric Ulcer, Dieulfoy lesion, hemostatic clips placed - Massive GI Bleed - Transferred to [facility]     RELEASE CARPAL TUNNEL      RIGHT - Carpal Tunnel Syndrome     REPAIR BLADDER         Social History     Tobacco Use     Smoking status: Never Smoker     Smokeless tobacco: Never Used   Substance Use Topics     Alcohol use: No     Alcohol/week: 0.0 oz     Family History   Problem Relation Age of Onset     Cancer Brother         Pancreatic     Cancer Brother         Prostate     Cancer Sister         Breast     Hypertension Brother      Hypertension Father      Hypertension Sister      Hypertension Mother      Aneurysm Daughter 68        cerebral, sudden death     Osteoporosis Other      Thyroid Disease No family hx of      Diabetes No family hx of          Current Outpatient Medications   Medication Sig Dispense Refill     calcium 1500 MG TABS Take 1 tablet by mouth daily 100 tablet 3     isosorbide mononitrate (IMDUR) 30 MG 24 hr tablet TAKE ONE TABLET BY MOUTH AT BEDTIME. 90 tablet 3     LORazepam (ATIVAN) 0.5 MG tablet TAKE 1 TABLET BY MOUTH EVERY 8 HOURS AS NEEDED FOR ANXIETY 30 tablet 0     Multiple vitamin TABS Take 1 tablet by oral route every day with foodTake 1 tablet by oral route every day with food 900 tablet 3     nitroGLYcerin (NITROSTAT) 0.4 MG sublingual tablet Place 1 tablet (0.4mg) by sublingual route at the first sign of attack; may  repeat ever 5 min until relief; if pain persists after 3 tablets in 15 minutes prompt medical attention is recommended. AS NEEDED 25 tablet 3     STATIN NOT PRESCRIBED, INTENTIONAL, Statin not prescribed intentionally due to Other patient declines (This option does not exclude patient from measure) 0 each 0     triamterene-HCTZ (DYAZIDE) 37.5-25 MG capsule Take 1 capsule by mouth daily 90 capsule 3     acetaminophen (TYLENOL) 500 MG tablet Take 2 tablet (1000mg) by oral route every 6 hours as needed (Patient not taking: Reported on 7/22/2019) 100 tablet 2     Cholecalciferol (VITAMIN D) 1000 UNITS capsule Take 1 capsule by mouth daily (Patient not taking: Reported on 7/22/2019) 90 capsule 3     Allergies   Allergen Reactions     [place] Juice Other (See Comments), Nausea and GI Disturbance     Vertigo     Food      Oranges.     Naprosyn [Naproxen]      Incontinence       Niacin Swelling     Further mammograms declined    ROS:  Constitutional, HEENT, cardiovascular, pulmonary, GI, , musculoskeletal, neuro, skin, endocrine and psych systems are negative, except as otherwise noted.    OBJECTIVE:   /60 (BP Location: Left arm, Patient Position: Chair, Cuff Size: Adult Large)   Pulse 61   Temp 97  F (36.1  C) (Tympanic)   Ht 1.524 m (5')   Wt 50.8 kg (112 lb)   SpO2 98%   BMI 21.87 kg/m   Estimated body mass index is 21.87 kg/m  as calculated from the following:    Height as of this encounter: 1.524 m (5').    Weight as of this encounter: 50.8 kg (112 lb).  EXAM:   GENERAL APPEARANCE: healthy, alert and no distress  EYES: Eyes grossly normal to inspection, PERRL and conjunctivae and sclerae normal  HENT: ear canals and TM's normal, nose and mouth without ulcers or lesions, oropharynx clear and oral mucous membranes moist  NECK: no adenopathy, no asymmetry, masses, or scars and thyroid normal to palpation  RESP: lungs clear to auscultation - no rales, rhonchi or wheezes  BREAST: normal without masses,  tenderness or nipple discharge and no palpable axillary masses or adenopathy  CV: regular rate and rhythm, normal S1 S2, no S3 or S4, no murmur, click or rub, no peripheral edema and peripheral pulses strong  ABDOMEN: soft, nontender, no hepatosplenomegaly, no masses and bowel sounds normal  MS: no musculoskeletal defects are noted and gait is age appropriate without ataxia  SKIN: no suspicious lesions or rashes  SKIN: pacemaker palpated left upper chest  NEURO: Normal strength and tone, sensory exam grossly normal, mentation intact and speech normal  PSYCH: mentation appears normal and affect normal/bright        ASSESSMENT / PLAN:   1. Routine general medical examination at a health care facility  Doing well. No further mammograms    2. Benign essential hypertension  Good control    3. CKD (chronic kidney disease) stage 3, GFR 30-59 ml/min (H)  Stable, labs done earlier    4. Pulmonary fibrosis (H)  stable    5. Sick sinus syndrome (H)  Stable     6. Presence of cardiac pacemaker      7. Coronary arteriosclerosis in native artery  No symptoms    8. Thrombocytopenia (H)  Not wanting multiple labs checked     9. Other emphysema (H)  stable    10. Mixed hyperlipidemia  No further lab testing    Immunization due  Shingles vaccine discussed. Prescription is given to take to an outside pharmacy to check cost      End of Life Planning:  Patient currently has an advanced directive: Yes.  Practitioner is supportive of decision.    COUNSELING:  Reviewed preventive health counseling, as reflected in patient instructions       Regular exercise       Healthy diet/nutrition    Estimated body mass index is 21.87 kg/m  as calculated from the following:    Height as of this encounter: 1.524 m (5').    Weight as of this encounter: 50.8 kg (112 lb).    Weight management plan noted, stable and monitoring     reports that she has never smoked. She has never used smokeless tobacco.      Appropriate preventive services were discussed  with this patient, including applicable screening as appropriate for cardiovascular disease, diabetes, osteopenia/osteoporosis, and glaucoma.  As appropriate for age/gender, discussed screening for colorectal cancer, prostate cancer, breast cancer, and cervical cancer. Checklist reviewing preventive services available has been given to the patient.    Reviewed patients plan of care and provided an AVS. The Basic Care Plan (routine screening as documented in Health Maintenance) for Lauren meets the Care Plan requirement. This Care Plan has been established and reviewed with the Patient.    Counseling Resources:  ATP IV Guidelines  Pooled Cohorts Equation Calculator  Breast Cancer Risk Calculator  FRAX Risk Assessment  ICSI Preventive Guidelines  Dietary Guidelines for Americans, 2010  USDA's MyPlate  ASA Prophylaxis  Lung CA Screening    Vandana Jones MD  [facility]

## 2019-07-22 ENCOUNTER — OFFICE VISIT (OUTPATIENT)
Dept: FAMILY MEDICINE | Facility: OTHER | Age: 84
End: 2019-07-22
Attending: NURSE PRACTITIONER
Payer: COMMERCIAL

## 2019-07-22 VITALS
DIASTOLIC BLOOD PRESSURE: 60 MMHG | TEMPERATURE: 97 F | WEIGHT: 112 LBS | OXYGEN SATURATION: 98 % | HEART RATE: 61 BPM | BODY MASS INDEX: 21.99 KG/M2 | SYSTOLIC BLOOD PRESSURE: 138 MMHG | HEIGHT: 60 IN

## 2019-07-22 DIAGNOSIS — J43.8 OTHER EMPHYSEMA (H): ICD-10-CM

## 2019-07-22 DIAGNOSIS — I49.5 SICK SINUS SYNDROME (H): ICD-10-CM

## 2019-07-22 DIAGNOSIS — D69.6 THROMBOCYTOPENIA (H): ICD-10-CM

## 2019-07-22 DIAGNOSIS — J84.10 PULMONARY FIBROSIS (H): ICD-10-CM

## 2019-07-22 DIAGNOSIS — I25.10 CORONARY ARTERIOSCLEROSIS IN NATIVE ARTERY: ICD-10-CM

## 2019-07-22 DIAGNOSIS — E78.2 MIXED HYPERLIPIDEMIA: ICD-10-CM

## 2019-07-22 DIAGNOSIS — Z23 IMMUNIZATION DUE: ICD-10-CM

## 2019-07-22 DIAGNOSIS — Z95.0 PRESENCE OF CARDIAC PACEMAKER: ICD-10-CM

## 2019-07-22 DIAGNOSIS — Z00.00 ROUTINE GENERAL MEDICAL EXAMINATION AT A HEALTH CARE FACILITY: Primary | ICD-10-CM

## 2019-07-22 DIAGNOSIS — I10 BENIGN ESSENTIAL HYPERTENSION: ICD-10-CM

## 2019-07-22 DIAGNOSIS — N18.30 CKD (CHRONIC KIDNEY DISEASE) STAGE 3, GFR 30-59 ML/MIN (H): ICD-10-CM

## 2019-07-22 PROCEDURE — 99397 PER PM REEVAL EST PAT 65+ YR: CPT | Performed by: FAMILY MEDICINE

## 2019-07-22 ASSESSMENT — ANXIETY QUESTIONNAIRES
4. TROUBLE RELAXING: NOT AT ALL
3. WORRYING TOO MUCH ABOUT DIFFERENT THINGS: NOT AT ALL
6. BECOMING EASILY ANNOYED OR IRRITABLE: NOT AT ALL
GAD7 TOTAL SCORE: 0
2. NOT BEING ABLE TO STOP OR CONTROL WORRYING: NOT AT ALL
7. FEELING AFRAID AS IF SOMETHING AWFUL MIGHT HAPPEN: NOT AT ALL
5. BEING SO RESTLESS THAT IT IS HARD TO SIT STILL: NOT AT ALL
1. FEELING NERVOUS, ANXIOUS, OR ON EDGE: NOT AT ALL

## 2019-07-22 ASSESSMENT — MIFFLIN-ST. JEOR: SCORE: 834.53

## 2019-07-22 ASSESSMENT — PAIN SCALES - GENERAL: PAINLEVEL: NO PAIN (0)

## 2019-07-22 ASSESSMENT — PATIENT HEALTH QUESTIONNAIRE - PHQ9: SUM OF ALL RESPONSES TO PHQ QUESTIONS 1-9: 0

## 2019-07-22 NOTE — LETTER
My COPD Action Plan     Name: Lauren Barron    YOB: 1926   Date: 7/8/2019    My doctor: Vandana Jones MD   My clinic: [facility] [place]    [address] [place], [place] [address] [address]  [phone]  My Controller Medicine: none   Dose:      My Rescue Medicine: Albuterol (Proair/Ventolin/Proventil) inhaler   Dose: 2 puffs up to four times daily     My Flare Up Medicine: to be seen   Dose:      My COPD Severity: Moderate = FeV1 < 79% -50%      Use of Oxygen: Oxygen Not Prescribed      Make sure you've had your pneumonia   vaccines.          GREEN ZONE       Doing well today      Usual level of activity and exercise    Usual amount of cough and mucus    No shortness of breath    Usual level of health (thinking clearly, sleeping well, feel like eating) Actions:      Take daily medicines    Use oxygen as prescribed    Follow regular exercise and diet plan    Avoid cigarette smoke and other irritants that harm the lungs           YELLOW ZONE          Having a bad day or flare up      Short of breath more than usual    A lot more sputum (mucus) than usual    Sputum looks yellow, green, tan, brown or bloody    More coughing or wheezing    Fever or chills    Less energy; trouble completing activities    Trouble thinking or focusing    Using quick relief inhaler or nebulizer more often    Poor sleep; symptoms wake me up    Do not feel like eating Actions:      Get plenty of rest    Take daily medicines    Use quick relief inhaler every 4 hours    If you use oxygen, call you doctor to see if you should adjust your oxygen    Do breathing exercises or other things to help you relax    Let a loved one, friend or neighbor know you are feeling worse    Call your care team if you have 2 or more symptoms.  Start taking steroids or antibiotics if directed by your care team           RED ZONE       Need medical care now      Severe shortness of breath (feel you can't breathe)    Fever, chills    Not enough  breath to do any activity    Trouble coughing up mucus, walking or talking    Blood in mucus    Frequent coughing   Rescue medicines are not working    Not able to sleep because of breathing    Feel confused or drowsy    Chest pain    Actions:      Call your health care team.  If you cannot reach your care team, call 911 or go to the emergency room.        Annual Reminders:  Meet with Care Team, Flu Shot every Fall  Pharmacy:    [id number] - [place], [place] - [address]  [facility] [place] - [place], [place] - [address]

## 2019-07-22 NOTE — NURSING NOTE
Chief Complaint   Patient presents with     Physical       Initial /62 (BP Location: Left arm, Patient Position: Chair, Cuff Size: Adult Regular)   Pulse 61   Temp 97  F (36.1  C) (Tympanic)   Ht 1.524 m (5')   Wt 50.8 kg (112 lb)   SpO2 98%   BMI 21.87 kg/m   Estimated body mass index is 21.87 kg/m  as calculated from the following:    Height as of this encounter: 1.524 m (5').    Weight as of this encounter: 50.8 kg (112 lb).  Medication Reconciliation: complete     Harry Lopez LPN

## 2019-07-23 ASSESSMENT — ANXIETY QUESTIONNAIRES: GAD7 TOTAL SCORE: 0

## 2019-08-20 DIAGNOSIS — F41.9 ANXIETY: Primary | ICD-10-CM

## 2019-08-23 RX ORDER — LORAZEPAM 0.5 MG/1
TABLET ORAL
Qty: 30 TABLET | Refills: 0 | Status: SHIPPED | OUTPATIENT
Start: 2019-08-23 | End: 2019-09-25

## 2019-08-23 NOTE — TELEPHONE ENCOUNTER
Lorazepam 0.5mg      Last Written Prescription Date:  5/21/2019  Last Fill Quantity: 30,   # refills: 0  Last Office Visit: 7/22/2019  Future Office visit:       Routing refill request to provider for review/approval because:  Drug not on the [facility], P or [facility] refill protocol or controlled substance

## 2019-09-23 NOTE — PROGRESS NOTES
"Mikaela Barron is a 89 year old female who presents to clinic today for the following health issues:    HPI   Clean ears      Duration: ***    Description (location/character/radiation): ***    Intensity:  {mild,moderate,severe:072284}    Accompanying signs and symptoms: ***    History (similar episodes/previous evaluation): {.:484009::\"None\"}    Precipitating or alleviating factors: {.:740361::\"None\"}    Therapies tried and outcome: {.:373361::\"None\"}     {additonal problems for provider to add (Optional):089326}    {HIST REVIEW/ LINKS 2 (Optional):592806}    {Additional problems for the provider to add (optional):956001}  Reviewed and updated as needed this visit by Provider         Review of Systems   {ROS COMP (Optional):421090}      Objective    There were no vitals taken for this visit.  There is no height or weight on file to calculate BMI.  Physical Exam   {Exam List (Optional):288874}    {Diagnostic Test Results (Optional):426672::\"Diagnostic Test Results:\",\"Labs reviewed in Epic\"}        {PROVIDER CHARTING PREFERENCE:496377}      "

## 2019-09-25 ENCOUNTER — APPOINTMENT (OUTPATIENT)
Dept: CT IMAGING | Facility: HOSPITAL | Age: 84
DRG: 378 | End: 2019-09-25
Attending: EMERGENCY MEDICINE
Payer: COMMERCIAL

## 2019-09-25 ENCOUNTER — HOSPITAL ENCOUNTER (INPATIENT)
Facility: HOSPITAL | Age: 84
LOS: 2 days | Discharge: HOME | DRG: 378 | End: 2019-09-27
Attending: EMERGENCY MEDICINE | Admitting: INTERNAL MEDICINE
Payer: COMMERCIAL

## 2019-09-25 DIAGNOSIS — K92.2 ACUTE GI BLEEDING: ICD-10-CM

## 2019-09-25 DIAGNOSIS — K62.89 RECTAL MASS: ICD-10-CM

## 2019-09-25 DIAGNOSIS — F41.9 ANXIETY: Primary | ICD-10-CM

## 2019-09-25 DIAGNOSIS — E87.1 HYPONATREMIA: ICD-10-CM

## 2019-09-25 DIAGNOSIS — K92.2 GASTROINTESTINAL HEMORRHAGE, UNSPECIFIED GASTROINTESTINAL HEMORRHAGE TYPE: Primary | ICD-10-CM

## 2019-09-25 LAB
ABO + RH BLD: NORMAL
ABO + RH BLD: NORMAL
ALBUMIN SERPL-MCNC: 3.2 G/DL (ref 3.4–5)
ALBUMIN UR-MCNC: NEGATIVE MG/DL
ALP SERPL-CCNC: 47 U/L (ref 40–150)
ALT SERPL W P-5'-P-CCNC: 29 U/L (ref 0–50)
ANION GAP SERPL CALCULATED.3IONS-SCNC: 10 MMOL/L (ref 3–14)
APPEARANCE UR: CLEAR
AST SERPL W P-5'-P-CCNC: 19 U/L (ref 0–45)
BACTERIA #/AREA URNS HPF: ABNORMAL /HPF
BASOPHILS # BLD AUTO: 0 10E9/L (ref 0–0.2)
BASOPHILS NFR BLD AUTO: 0.3 %
BILIRUB SERPL-MCNC: 0.3 MG/DL (ref 0.2–1.3)
BILIRUB UR QL STRIP: NEGATIVE
BLD GP AB SCN SERPL QL: NORMAL
BLOOD BANK CMNT PATIENT-IMP: NORMAL
BUN SERPL-MCNC: 84 MG/DL (ref 7–30)
CALCIUM SERPL-MCNC: 8.8 MG/DL (ref 8.5–10.1)
CHLORIDE SERPL-SCNC: 94 MMOL/L (ref 94–109)
CO2 SERPL-SCNC: 23 MMOL/L (ref 20–32)
COLOR UR AUTO: ABNORMAL
CREAT SERPL-MCNC: 0.85 MG/DL (ref 0.52–1.04)
D DIMER PPP DDU-MCNC: 214 NG/ML D-DU (ref 0–300)
DIFFERENTIAL METHOD BLD: ABNORMAL
EOSINOPHIL # BLD AUTO: 0 10E9/L (ref 0–0.7)
EOSINOPHIL NFR BLD AUTO: 0.3 %
ERYTHROCYTE [DISTWIDTH] IN BLOOD BY AUTOMATED COUNT: 13.2 % (ref 10–15)
GFR SERPL CREATININE-BSD FRML MDRD: 59 ML/MIN/{1.73_M2}
GLUCOSE SERPL-MCNC: 105 MG/DL (ref 70–99)
GLUCOSE UR STRIP-MCNC: NEGATIVE MG/DL
HCT VFR BLD AUTO: 26.1 % (ref 35–47)
HGB BLD-MCNC: 8.6 G/DL (ref 11.7–15.7)
HGB BLD-MCNC: 9.4 G/DL (ref 11.7–15.7)
HGB UR QL STRIP: ABNORMAL
IMM GRANULOCYTES # BLD: 0 10E9/L (ref 0–0.4)
IMM GRANULOCYTES NFR BLD: 0.5 %
KETONES UR STRIP-MCNC: NEGATIVE MG/DL
LACTATE BLD-SCNC: 1.6 MMOL/L (ref 0.7–2)
LEUKOCYTE ESTERASE UR QL STRIP: NEGATIVE
LIPASE SERPL-CCNC: 236 U/L (ref 73–393)
LYMPHOCYTES # BLD AUTO: 1.2 10E9/L (ref 0.8–5.3)
LYMPHOCYTES NFR BLD AUTO: 16.6 %
MCH RBC QN AUTO: 30.5 PG (ref 26.5–33)
MCHC RBC AUTO-ENTMCNC: 36 G/DL (ref 31.5–36.5)
MCV RBC AUTO: 85 FL (ref 78–100)
MONOCYTES # BLD AUTO: 0.7 10E9/L (ref 0–1.3)
MONOCYTES NFR BLD AUTO: 9.4 %
NEUTROPHILS # BLD AUTO: 5.4 10E9/L (ref 1.6–8.3)
NEUTROPHILS NFR BLD AUTO: 72.9 %
NITRATE UR QL: NEGATIVE
NRBC # BLD AUTO: 0 10*3/UL
NRBC BLD AUTO-RTO: 0 /100
NT-PROBNP SERPL-MCNC: 1367 PG/ML (ref 0–1800)
PH UR STRIP: 6 PH (ref 4.7–8)
PLATELET # BLD AUTO: 259 10E9/L (ref 150–450)
POTASSIUM SERPL-SCNC: 4.4 MMOL/L (ref 3.4–5.3)
PROT SERPL-MCNC: 6.4 G/DL (ref 6.8–8.8)
RBC # BLD AUTO: 3.08 10E12/L (ref 3.8–5.2)
RBC #/AREA URNS AUTO: <1 /HPF (ref 0–2)
SODIUM SERPL-SCNC: 127 MMOL/L (ref 133–144)
SOURCE: ABNORMAL
SP GR UR STRIP: 1.02 (ref 1–1.03)
SPECIMEN EXP DATE BLD: NORMAL
SQUAMOUS #/AREA URNS AUTO: 1 /HPF (ref 0–1)
TROPONIN I SERPL-MCNC: 0.03 UG/L (ref 0–0.04)
UROBILINOGEN UR STRIP-MCNC: NORMAL MG/DL (ref 0–2)
WBC # BLD AUTO: 7.5 10E9/L (ref 4–11)
WBC #/AREA URNS AUTO: 2 /HPF (ref 0–5)

## 2019-09-25 PROCEDURE — 99223 1ST HOSP IP/OBS HIGH 75: CPT | Mod: AI | Performed by: INTERNAL MEDICINE

## 2019-09-25 PROCEDURE — 85379 FIBRIN DEGRADATION QUANT: CPT | Performed by: EMERGENCY MEDICINE

## 2019-09-25 PROCEDURE — 25000132 ZZH RX MED GY IP 250 OP 250 PS 637: Performed by: EMERGENCY MEDICINE

## 2019-09-25 PROCEDURE — 36415 COLL VENOUS BLD VENIPUNCTURE: CPT | Performed by: INTERNAL MEDICINE

## 2019-09-25 PROCEDURE — 83690 ASSAY OF LIPASE: CPT | Performed by: FAMILY MEDICINE

## 2019-09-25 PROCEDURE — 74177 CT ABD & PELVIS W/CONTRAST: CPT | Mod: TC

## 2019-09-25 PROCEDURE — 25000125 ZZHC RX 250: Performed by: EMERGENCY MEDICINE

## 2019-09-25 PROCEDURE — 25000128 H RX IP 250 OP 636: Performed by: SURGERY

## 2019-09-25 PROCEDURE — 25000132 ZZH RX MED GY IP 250 OP 250 PS 637: Performed by: INTERNAL MEDICINE

## 2019-09-25 PROCEDURE — 25500064 ZZH RX 255 OP 636: Performed by: RADIOLOGY

## 2019-09-25 PROCEDURE — 81001 URINALYSIS AUTO W/SCOPE: CPT | Performed by: EMERGENCY MEDICINE

## 2019-09-25 PROCEDURE — C9113 INJ PANTOPRAZOLE SODIUM, VIA: HCPCS | Performed by: SURGERY

## 2019-09-25 PROCEDURE — 36415 COLL VENOUS BLD VENIPUNCTURE: CPT | Performed by: FAMILY MEDICINE

## 2019-09-25 PROCEDURE — C9113 INJ PANTOPRAZOLE SODIUM, VIA: HCPCS | Performed by: EMERGENCY MEDICINE

## 2019-09-25 PROCEDURE — 96374 THER/PROPH/DIAG INJ IV PUSH: CPT

## 2019-09-25 PROCEDURE — 83605 ASSAY OF LACTIC ACID: CPT | Performed by: EMERGENCY MEDICINE

## 2019-09-25 PROCEDURE — 40000786 ZZHCL STATISTIC ACTIVE MRSA SURVEILLANCE CULTURE: Performed by: INTERNAL MEDICINE

## 2019-09-25 PROCEDURE — 36415 COLL VENOUS BLD VENIPUNCTURE: CPT | Performed by: EMERGENCY MEDICINE

## 2019-09-25 PROCEDURE — 85025 COMPLETE CBC W/AUTO DIFF WBC: CPT | Performed by: FAMILY MEDICINE

## 2019-09-25 PROCEDURE — 99285 EMERGENCY DEPT VISIT HI MDM: CPT | Mod: Z6 | Performed by: EMERGENCY MEDICINE

## 2019-09-25 PROCEDURE — 85018 HEMOGLOBIN: CPT | Performed by: INTERNAL MEDICINE

## 2019-09-25 PROCEDURE — 80053 COMPREHEN METABOLIC PANEL: CPT | Performed by: FAMILY MEDICINE

## 2019-09-25 PROCEDURE — 12000000 ZZH R&B MED SURG/OB

## 2019-09-25 PROCEDURE — 84484 ASSAY OF TROPONIN QUANT: CPT | Performed by: EMERGENCY MEDICINE

## 2019-09-25 PROCEDURE — 86850 RBC ANTIBODY SCREEN: CPT | Performed by: EMERGENCY MEDICINE

## 2019-09-25 PROCEDURE — 93005 ELECTROCARDIOGRAM TRACING: CPT

## 2019-09-25 PROCEDURE — 99285 EMERGENCY DEPT VISIT HI MDM: CPT | Mod: 25

## 2019-09-25 PROCEDURE — 99232 SBSQ HOSP IP/OBS MODERATE 35: CPT | Mod: 25 | Performed by: SURGERY

## 2019-09-25 PROCEDURE — 96361 HYDRATE IV INFUSION ADD-ON: CPT

## 2019-09-25 PROCEDURE — 71275 CT ANGIOGRAPHY CHEST: CPT | Mod: TC

## 2019-09-25 PROCEDURE — 25800030 ZZH RX IP 258 OP 636: Performed by: INTERNAL MEDICINE

## 2019-09-25 PROCEDURE — 83880 ASSAY OF NATRIURETIC PEPTIDE: CPT | Performed by: EMERGENCY MEDICINE

## 2019-09-25 PROCEDURE — 86901 BLOOD TYPING SEROLOGIC RH(D): CPT | Performed by: EMERGENCY MEDICINE

## 2019-09-25 PROCEDURE — 25000128 H RX IP 250 OP 636: Performed by: EMERGENCY MEDICINE

## 2019-09-25 PROCEDURE — 93010 ELECTROCARDIOGRAM REPORT: CPT | Performed by: INTERNAL MEDICINE

## 2019-09-25 PROCEDURE — 86900 BLOOD TYPING SEROLOGIC ABO: CPT | Performed by: EMERGENCY MEDICINE

## 2019-09-25 RX ORDER — LORAZEPAM 0.5 MG/1
0.5 TABLET ORAL 3 TIMES DAILY PRN
Status: DISCONTINUED | OUTPATIENT
Start: 2019-09-25 | End: 2019-09-27 | Stop reason: HOSPADM

## 2019-09-25 RX ORDER — ONDANSETRON 4 MG/1
4 TABLET, ORALLY DISINTEGRATING ORAL EVERY 6 HOURS PRN
Status: DISCONTINUED | OUTPATIENT
Start: 2019-09-25 | End: 2019-09-27 | Stop reason: HOSPADM

## 2019-09-25 RX ORDER — LIDOCAINE 40 MG/G
CREAM TOPICAL
Status: DISCONTINUED | OUTPATIENT
Start: 2019-09-25 | End: 2019-09-27 | Stop reason: HOSPADM

## 2019-09-25 RX ORDER — ASCORBIC ACID 500 MG
500 TABLET ORAL DAILY
COMMUNITY
End: 2020-07-21

## 2019-09-25 RX ORDER — PROCHLORPERAZINE 25 MG
12.5 SUPPOSITORY, RECTAL RECTAL EVERY 12 HOURS PRN
Status: DISCONTINUED | OUTPATIENT
Start: 2019-09-25 | End: 2019-09-27 | Stop reason: HOSPADM

## 2019-09-25 RX ORDER — ISOSORBIDE MONONITRATE 30 MG/1
30 TABLET, EXTENDED RELEASE ORAL AT BEDTIME
Status: DISCONTINUED | OUTPATIENT
Start: 2019-09-25 | End: 2019-09-27 | Stop reason: HOSPADM

## 2019-09-25 RX ORDER — PROCHLORPERAZINE MALEATE 5 MG
5 TABLET ORAL EVERY 6 HOURS PRN
Status: DISCONTINUED | OUTPATIENT
Start: 2019-09-25 | End: 2019-09-27 | Stop reason: HOSPADM

## 2019-09-25 RX ORDER — ONDANSETRON 2 MG/ML
4 INJECTION INTRAMUSCULAR; INTRAVENOUS EVERY 6 HOURS PRN
Status: DISCONTINUED | OUTPATIENT
Start: 2019-09-25 | End: 2019-09-27 | Stop reason: HOSPADM

## 2019-09-25 RX ORDER — NALOXONE HYDROCHLORIDE 0.4 MG/ML
.1-.4 INJECTION, SOLUTION INTRAMUSCULAR; INTRAVENOUS; SUBCUTANEOUS
Status: DISCONTINUED | OUTPATIENT
Start: 2019-09-25 | End: 2019-09-27 | Stop reason: HOSPADM

## 2019-09-25 RX ORDER — TRIAMTERENE AND HYDROCHLOROTHIAZIDE 37.5; 25 MG/1; MG/1
1 CAPSULE ORAL DAILY
Status: DISCONTINUED | OUTPATIENT
Start: 2019-09-26 | End: 2019-09-27 | Stop reason: HOSPADM

## 2019-09-25 RX ADMIN — IOHEXOL 75 ML: 350 INJECTION, SOLUTION INTRAVENOUS at 12:54

## 2019-09-25 RX ADMIN — PANTOPRAZOLE SODIUM 40 MG: 40 INJECTION, POWDER, LYOPHILIZED, FOR SOLUTION INTRAVENOUS at 21:15

## 2019-09-25 RX ADMIN — PANTOPRAZOLE SODIUM 40 MG: 40 INJECTION, POWDER, LYOPHILIZED, FOR SOLUTION INTRAVENOUS at 12:08

## 2019-09-25 RX ADMIN — SODIUM CHLORIDE 500 ML: 9 INJECTION, SOLUTION INTRAVENOUS at 12:45

## 2019-09-25 RX ADMIN — LIDOCAINE HYDROCHLORIDE 30 ML: 20 SOLUTION ORAL; TOPICAL at 12:08

## 2019-09-25 RX ADMIN — DEXTROSE AND SODIUM CHLORIDE: 5; 900 INJECTION, SOLUTION INTRAVENOUS at 16:11

## 2019-09-25 RX ADMIN — ISOSORBIDE MONONITRATE 30 MG: 30 TABLET, EXTENDED RELEASE ORAL at 21:15

## 2019-09-25 ASSESSMENT — ENCOUNTER SYMPTOMS
ABDOMINAL PAIN: 0
FEVER: 0
SHORTNESS OF BREATH: 0

## 2019-09-25 ASSESSMENT — ACTIVITIES OF DAILY LIVING (ADL)
ADLS_ACUITY_SCORE: 12
ADLS_ACUITY_SCORE: 12

## 2019-09-25 ASSESSMENT — MIFFLIN-ST. JEOR: SCORE: 844.5

## 2019-09-25 NOTE — PLAN OF CARE
[facility] Inpatient Admission Note:    Patient admitted to 3224/3224-1 at approximately 1500 via cart accompanied by daughters from emergency room . Report received from Nzuhat COLLADO in SBAR format at 1530 via face to face in room. Patient ambulated to bed via self.. Patient is alert and oriented X 3, denies pain; rates at 0 on 0-10 scale.  Patient oriented to room, unit, hourly rounding, and plan of care. Explained admission packet and patient handbook with patient bill of rights brochure. Will continue to monitor and document as needed.     Inpatient Nursing criteria listed below was met:    Health care directives status obtained and documented: Yes    Care Everywhere authorization obtained Yes    MRSA swab completed for patient 65 years and older: Yes    Patient identifies a surrogate decision maker: Yes If yes, who:Daughter Yesica Contact Information:see facesheet     If initial lactic acid >2.0, repeat lactic acid drawn within one hour of arrival to unit: NA. If no, state reason:     Vaccination assessment and education completed: Yes   Vaccinations received prior to admission: Pneumovax yes  Influenza(seasonal)  NO   Vaccination(s) ordered: pt up to date    Clergy visit ordered if patient requests: N/A    Skin issues/needs documented: N/A    Isolation Patient: no Education given, correct sign in place and documentation row added to PCS:  No    Fall Prevention Yes: Care plan updated, education given and documented, sticker and magnet in place: Yes    Care Plan initiated: Yes    Education Documented (including assessment): Yes    Patient has discharge needs : No If yes, please explain:

## 2019-09-25 NOTE — H&P
[facility]    History and Physical  Hospitalist       Date of Admission:  9/25/2019  Date of Service (when I saw the patient): 09/25/19    Assessment & Plan   Lauren Barron is a 89 year old female with a history of upper GI bleed in the past status post EGD in 2012 with H. pylori positive esophagitis and gastric ulcer, history of coronary artery disease status post stenting, sick sinus syndrome status post pacemaker placement, hypertension, hyperlipidemia who presents with bloody stools x2.    Acute GI bleeding: with resultant acute blood loss anemia.  Patient has h/o bleeding in 2012 and was H. pylori positive from reportedly esophagitis and gastric ulcer.  Per patient history, seems like upper bleed, however CT scan of the abdomen pelvis in the emergency department revealed possible rectal mass.  Hemoglobin dropped from baseline of 12 to 9.4.  -Serial hemoglobin monitoring  -Type and cross  -IV PPI  -Surgical consultation for possible endoscopy, possibly upper and lower    CVS: Patient has history of coronary artery disease status post bare-metal stenting.  She also has sick sinus syndrome status post pacemaker placement.  Currently blood pressure and heart rate are within normal limits and stable.  -Continue home Dyazide for hypertension  -Continue home Imdur  -Telemetry monitoring for acute bleeding    Hyperlipidemia: Patient refused pharmacological therapy  -No statin prescribed    FEN: N.p.o. status for the time being.  Hyponatremia likely secondary to hypovolemia  -Gentle IV fluids    DVT Prophylaxis: Pneumatic Compression Devices    Code Status: DNR / DNI historically    Disposition: Expected discharge in 1-3 days depending on hemoglobin stability and procedures..    Nupur Epperson MD      Primary Care Physician   Vandana Jones    Chief Complaint   Bloody dark stool x2 at home    History is obtained from the patient    History of Present Illness   Lauren Barron is a 89 year old female  with a history of upper GI bleed in the past status post EGD in 2012 with H. pylori positive esophagitis and gastric ulcers, history of coronary artery disease status post stenting, sick sinus syndrome status post pacemaker placement, hypertension, hyperlipidemia who presents with bloody stools x2 earlier this morning.  The patient is not currently on any anticoagulation.  Patient denies any dizziness or lightheadedness.  She called EMS and she was brought to the emergency department.  Her hemoglobin was found to be 9.4, down from a baseline of about 12.  She currently has no pain, she denies any nausea or vomiting.  She denies any hematemesis.  She describes her stool is black and dark.  She last had food yesterday p.m.  She also has a history of hiatal hernia.  She is currently not taking any proton pump inhibitors chronically.  CT scan in the emergency department revealed a possible rectal mass.  She will be admitted for further management.      Past Medical History    I have reviewed this patient's medical history and updated it with pertinent information if needed.   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     Aortic insufficiency 6/2/2015    moderate, aortic sclerosis without stenosis echo 5/6/2015      ASCVD (arteriosclerotic cardiovascular disease) 08/25/2009    Bare metal stent obtuse margnial     Atherosclerosis of aorta (H) 03/12/2012    ECHO 2/2012     Cystocele 03/22/2012     Diastolic dysfunction 03/12/2012     GI bleed 07/25/2011    EGD-H Pylori, treated, Colonoscopy small Polyp - transfused 3 units of blood.     Hyperlipidemia 12/02/2003     Hypertension 12/06/1999     Mammogram declined 12/20/2012     Meniere's disease, unspecified 07/25/2011     Mitral regurgitation 6/2/2015    moderate, echo  2015      Osteoarthritis 2000     Sick sinus syndrome (H) 2016    dual chamber pacemaker placement     Squamous cell skin cancer, chiquita coelloi 2006    MOHS Surgery     Transient global amnesia 2004       Past Surgical History   I have reviewed this patient's surgical history and updated it with pertinent information if needed.  Past Surgical History:   Procedure Laterality Date     APPENDECTOMY       ARTHROSCOPY KNEE      RIGHT - Osteoarthritis     C TOTAL KNEE ARTHROPLASTY      LEFT - Osteoarthritis - Ramírez Ruvalcaba     C TOTAL KNEE ARTHROPLASTY      RIGHT - Osteoarthritis - Ramírez Ruvalcaba     CATARACT EXTRACTION and LENS IMPLANTATION      BILATERAL     COLONOSCOPY  2012     Colonoscopy with Polypectomy  2012    GI BLEED - DR. DEL CASTILLO            HC REMOVAL OF OVARIAN CYST(S)       HYSTERECTOMY, YANG      Metorrhagia     IMPLANT PACEMAKER  2016     Left Subclavian Line, Triple Lumen      Gastric Ulcer, Dieulfoy lesion, hemostatic clips placed - Massive GI Bleed - [facility]     RELEASE CARPAL TUNNEL      RIGHT - Carpal Tunnel Syndrome     REPAIR BLADDER         Prior to Admission Medications   Prior to Admission Medications   Prescriptions Last Dose Informant Patient Reported? Taking?   Cholecalciferol (VITAMIN D) 1000 UNITS capsule  Self No Yes   Sig: Take 1 capsule by mouth daily   LORazepam (ATIVAN) 0.5 MG tablet  Self No Yes   Sig: TAKE 1 TABLET BY MOUTH EVERY 8 HOURS AS NEEDED FOR ANXIETY   Multiple vitamin TABS  Self No Yes   Sig: Take 1 tablet by oral route every day with foodTake 1 tablet by oral route every day with food   STATIN NOT PRESCRIBED, INTENTIONAL,  Self No Yes   Sig: Statin not prescribed intentionally due to Other patient declines (This option does not exclude patient from measure)   acetaminophen (TYLENOL) 500 MG tablet  Self No Yes   Sig: Take 2 tablet (1000mg) by oral route every 6 hours as needed   calcium 1500 MG  TABS  Self No Yes   Sig: Take 1 tablet by mouth daily   isosorbide mononitrate (IMDUR) 30 MG 24 hr tablet  Self No Yes   Sig: TAKE ONE TABLET BY MOUTH AT BEDTIME.   nitroGLYcerin (NITROSTAT) 0.4 MG sublingual tablet  Self No Yes   Sig: Place 1 tablet (0.4mg) by sublingual route at the first sign of attack; may repeat ever 5 min until relief; if pain persists after 3 tablets in 15 minutes prompt medical attention is recommended. AS NEEDED   triamterene-HCTZ (DYAZIDE) 37.5-25 MG capsule  Self No Yes   Sig: Take 1 capsule by mouth daily      Facility-Administered Medications: None     Allergies   Allergies   Allergen Reactions     [place] Juice Other (See Comments), Nausea and GI Disturbance     Vertigo     Food      Oranges.     Naprosyn [Naproxen]      Incontinence       Niacin Swelling       Social History   I have reviewed this patient's social history and updated it with pertinent information if needed. Lauren Barron  reports that she has never smoked. She has never used smokeless tobacco. She reports that she does not drink alcohol or use drugs.  She lives by herself, ambulates with a cane at baseline.    Family History   I have reviewed this patient's family history and updated it with pertinent information if needed.   Family History   Problem Relation Age of Onset     Cancer Brother         Pancreatic     Cancer Brother         Prostate     Cancer Sister         Breast     Hypertension Brother      Hypertension Father      Hypertension Sister      Hypertension Mother      Aneurysm Daughter 68        cerebral, sudden death     Osteoporosis Other      Thyroid Disease No family hx of      Diabetes No family hx of        Review of Systems   The 10 point Review of Systems is negative other than noted in the HPI or here.     Physical Exam   Temp: (P) 97.2  F (36.2  C) Temp src: (P) Tympanic BP: (!) (P) 150/49   Heart Rate: (P) 60 Resp: (P) 18 SpO2: (P) 99 % O2 Device: (P) None (Room air)    Vital Signs with  Ranges  Temp:  [97.1  F (36.2  C)-98.5  F (36.9  C)] (P) 97.2  F (36.2  C)  Heart Rate:  [59-62] (P) 60  Resp:  [16-22] (P) 18  BP: (132-159)/(53-68) (P) 150/49  SpO2:  [92 %-99 %] (P) 99 %  0 lbs 0 oz    Constitutional: AA, NAD, frail elderly female  Eyes: PERRLA, no injection, no icterus  HEENT: atraumatic, normocephalic  Respiratory: CTA b/l  Cardiovascular: S1 S2 RRR  GI: soft, NT, ND, + bowel sounds  Lymph/Hematologic: no palpable lymphadenopathy  Skin: no rashes, no lesions  Musculoskeletal: No edema, good tone, no deformities  Neurologic: oriented x 3, no focal deficits  Psychiatric: appropriate affect    Data   Data reviewed today:  I personally reviewed imaging reports.  Recent Labs   Lab 09/25/19  1205 09/25/19  1052   WBC  --  7.5   HGB  --  9.4*   MCV  --  85   PLT  --  259   NA  --  127*   POTASSIUM  --  4.4   CHLORIDE  --  94   CO2  --  23   BUN  --  84*   CR  --  0.85   ANIONGAP  --  10   PEARL  --  8.8   GLC  --  105*   ALBUMIN  --  3.2*   PROTTOTAL  --  6.4*   BILITOTAL  --  0.3   ALKPHOS  --  47   ALT  --  29   AST  --  19   LIPASE  --  236   TROPI 0.029  --      Lactic Acid   Date Value Ref Range Status   09/25/2019 1.6 0.7 - 2.0 mmol/L Final       Recent Results (from the past 24 hour(s))   CT Chest (PE) Abdomen Pelvis w Contrast    Narrative    CT angiogram of the chest  with sagittal coronal MIP reconstructions  CT scan of the abdomen and pelvis with IV contrast with sagittal    HISTORY: Epigastric pain and exertional dyspnea gastrointestinal  bleeding    TECHNIQUE: CT angiogram the chest was performed with separate sagittal  coronal MIP reconstructions. CT scan of the abdomen and pelvis with IV  contrast sagittal coronal reconstructions were obtained.    FINDINGS: CT angiogram of the chest: There are no pulmonary emboli.  Atherosclerotic plaquing is seen in the thoracic aorta. The heart size  is normal. There is a pacemaker in place. There is some linear  scarring seen in both lungs. There are  no mediastinal masses or  lymphadenopathy. Axillary and supraclavicular lymph nodes appear  normal. A hiatal hernia is seen.    CT scan of the abdomen and pelvis: The liver is free of masses or  biliary ductal enlargement no calcified gallstones are noted. There is  a 1.3 cm in diameter heavily calcified splenic artery aneurysm. Spleen  and pancreas appear normal. There are no adrenal masses. The right  left kidneys are free of hydronephrosis there is some small cysts seen  in both kidneys. The periaortic lymph nodes are normal in caliber. No  intraperitoneal masses or inflammatory changes are noted. The bladder  appears normal. Is some questionable focal thickening in the rectum.  The possibility of a rectal mass should be considered. There are's  severe arthritic changes of both hips. Degenerative changes are  present in the thoracic spine. There is a superior endplate  compression of the L2 vertebra unchanged from 2017      Impression    IMPRESSION: CT angiogram reveals no evidence for pulmonary emboli.    CT scan of the abdomen and pelvis reveals some focal thickening in the  rectum the possibility of a rectal mass should be considered. No  intraperitoneal inflammatory changes are noted.    GALLITO HENRY MD

## 2019-09-25 NOTE — PLAN OF CARE
Patient has just been brought up from the ER at this time, full skin assessment and admission will be deferred to the next shift due to the late time of admission.

## 2019-09-25 NOTE — PLAN OF CARE
Face to face report given with opportunity to observe patient.    Report given to Leonardo Davis RN   9/25/2019  3:39 PM

## 2019-09-25 NOTE — CONSULTS
[facility] Surgery Consultation    CC:  Melena, epigastric pain     HPI:  This 89 year old year old female is seen at the request of Dr. Epperson for evaluation of melena and epigastric pain. She reported her upper abdominal pain began yesterday, then this am she noted two large black stools. This prompted her to come in and be evaluated. She does have a history of gastric ulcers and what sounds like a massive upper GI bleed in 2012. She is no longer taking a PPI. Denies NSAID use. She is currently in no pain. She has had her appendix removed. She was treated for h. Pylori in the past. She has not had any bowel movements since being admitted.     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     Aortic insufficiency 6/2/2015    moderate, aortic sclerosis without stenosis echo 5/6/2015      ASCVD (arteriosclerotic cardiovascular disease) 08/25/2009    Bare metal stent obtuse margnial     Atherosclerosis of aorta (H) 03/12/2012    ECHO 2/2012     Cystocele 03/22/2012     Diastolic dysfunction 03/12/2012     GI bleed 07/25/2011    EGD-H Pylori, treated, Colonoscopy small Polyp - transfused 3 units of blood.     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 DEL CASTILLO             REMOVAL OF OVARIAN CYST(S)       HYSTERECTOMY, YANG      Metorrhagia     IMPLANT PACEMAKER  2016     Left Subclavian Line, Triple Lumen      Gastric Ulcer, Dieulfoy lesion, hemostatic clips placed - Massive GI Bleed - Transferred to [facility]     RELEASE CARPAL TUNNEL      RIGHT - Carpal Tunnel Syndrome     REPAIR BLADDER         Allergies   Allergen Reactions     [place] Juice Other (See Comments), Nausea and GI Disturbance     Vertigo     Food      Oranges.     Naprosyn [Naproxen]      Incontinence       Niacin Swelling       Current Facility-Administered Medications   Medication     dextrose 5% and 0.9% NaCl infusion     famotidine (PEPCID) infusion 20 mg     [START ON 2019] influenza Vac Split High-Dose (FLUZONE) injection 0.5 mL     isosorbide mononitrate (IMDUR) 24 hr tablet 30 mg     lidocaine (LMX4) kit     lidocaine 1 % 0.1-1 mL     LORazepam (ATIVAN) tablet 0.5 mg     melatonin tablet 1 mg     naloxone (NARCAN) injection 0.1-0.4 mg     ondansetron (ZOFRAN-ODT) ODT tab 4 mg    Or     ondansetron (ZOFRAN) injection 4 mg     prochlorperazine (COMPAZINE) injection 5 mg    Or     prochlorperazine (COMPAZINE) tablet 5 mg    Or     prochlorperazine (COMPAZINE) Suppository 12.5 mg     sodium chloride (PF) 0.9% PF flush 3 mL     sodium chloride (PF) 0.9% PF flush 3 mL     [START ON 2019] triamterene-HCTZ (DYAZIDE) 37.5-25 MG per capsule 1 capsule       HABITS:    Social History     Tobacco Use     Smoking status: Never Smoker     Smokeless tobacco: Never Used   Substance Use Topics     Alcohol use: No     Alcohol/week: 0.0 standard drinks     Drug use: No       Family History   Problem Relation Age of Onset     Cancer Brother         Pancreatic      Cancer Brother         Prostate     Cancer Sister         Breast     Hypertension Brother      Hypertension Father      Hypertension Sister      Hypertension Mother      Aneurysm Daughter 68        cerebral, sudden death     Osteoporosis Other      Thyroid Disease No family hx of      Diabetes No family hx of        REVIEW OF SYSTEMS:  Ten point review of systems negative except those mentioned in the HPI.     OBJECTIVE:    BP (!) 150/49   Temp 97.2  F (36.2  C) (Tympanic)   Resp 18   Ht 1.524 m (5')   Wt 51.8 kg (114 lb 3.2 oz)   SpO2 99%   BMI 22.30 kg/m      GENERAL: Generally appears well, in no distress with appropriate affect.  HEENT:   Sclerae anicteric - normocephalic atraumatic   Respiratory:  No acute distress, no splinting   Cardiovascular:  Regular Rate and Rhythm  Abdomen: Soft non-tender non-distended   :  deferred  Extremities:  Extremities normal. No deformities, edema, or skin discoloration.  Skin:  no suspicious lesions or rashes  Neurological: grossly intact  Psych:  Alert, oriented, affect appropriate with normal decision making ability.    IMPRESSION:    89 y.o. female with history of upper GI bleed requiring endoscopic intervention presents with epigastric pain an melena consistent with upper GI bleed. CT scan obtained in the ED showed some rectal wall thickening, Will plan on upper endoscopy and a look at her rectum tomorrow with a flex sig. I believe this rectal thickening is likely just stool in her rectum. Will continue to follow     PLAN:    Upper endoscopy and flex sig tomorrow   Follow [facility]   IV PPI BID order placed      Jorge Freeman MD     9/25/2019  4:28 PM

## 2019-09-25 NOTE — ED PROVIDER NOTES
History     Chief Complaint   Patient presents with     Shortness of Breath     Melena     HPI  Lauren Barron is a 89 year old female who presented to the emergency department with complaints of passing dark blood in stool.  First noted yesterday evening and also this morning.  Patient has had GI bleed in the past but is currently not on any blood thinners.  She takes full-strength aspirin occasionally.  She has history of emphysema and chronic kidney disease stage III.  She also has a pacemaker for more than 30 years.  Patient is also complaining of shortness of breath over several days.  The shortness of breath is significant that taking off her clothes causes her to be short of breath.  She has history of coronary arterial sclerosis and aortic insufficiency.  No prior history of congestive heart failure.  No leg swelling or edema.  Denies orthopnea or paroxysmal nocturnal dyspnea.    Allergies:  Allergies   Allergen Reactions     [place] Juice Other (See Comments), Nausea and GI Disturbance     Vertigo     Food      Oranges.     Naprosyn [Naproxen]      Incontinence       Niacin Swelling       Problem List:    Patient Active Problem List    Diagnosis Date Noted     Acute GI bleeding 09/25/2019     Priority: Medium     Other emphysema (H) 10/29/2018     Priority: Medium     CKD (chronic kidney disease) stage 3, GFR 30-59 ml/min (H) 11/20/2017     Priority: Medium     Pulmonary nodules 06/08/2017     Priority: Medium     One, left lung, repeat chest CT 6 months, 12/2017       Benign essential hypertension 12/05/2016     Priority: Medium     Splenic artery aneurysm (H) 10/06/2016     Priority: Medium     Pulmonary fibrosis (H) 10/06/2016     Priority: Medium     Hiatal hernia 09/08/2016     Priority: Medium     Sick sinus syndrome (H) 06/14/2016     Priority: Medium     Pacemaker placed 4/2016       Presence of cardiac pacemaker 03/28/2016     Priority: Medium     Overview:   [place] [address] [id number]  #[id number]   3/28/2016  Atr lead [id number]  #[id number]  3/28/2016  Vent lead [address]70  #[id number]  3/28/2016  Dr Bradley  Bradycardia       Coronary arteriosclerosis in native artery 03/15/2016     Priority: Medium     Osteoarthritis 09/21/2015     Priority: Medium     Aortic insufficiency 06/02/2015     Priority: Medium     moderate, aortic sclerosis without stenosis  echo 5/6/2015       Mitral regurgitation 06/02/2015     Priority: Medium     moderate, echo 5/6/2015       Bradycardia 05/05/2015     Priority: Medium     Due to Metoprolol and amlodipine       Epistaxis, recurrent 05/01/2013     Priority: Medium     Mammogram declined 12/20/2012     Priority: Medium     Epistaxis 10/20/2012     Priority: Medium     Gastrointestinal hemorrhage 10/16/2012     Priority: Medium     Thrombocytopenia (H) 10/16/2012     Priority: Medium     Acute posthemorrhagic anemia 10/15/2012     Priority: Medium     Cystocele 03/22/2012     Priority: Medium     Advance Care Planning 03/16/2012     Priority: Medium     Advance Care Planning 8/31/2015: Receipt of ACP document:  Received: Health Care Directive which was witnessed or notarized on 8/20/15.  Document previously scanned on 8/28/15.  Validation form completed and scanned.  Code Status reflects choices in most recent ACP document.  Confirmed/documented designated decision maker(s).  Added by Kerrie Joaquin RN, BSN, MA, Advance Care Planning Liaison.  Advance Care Planning 8/5/2015: Receipt of ACP document:  Received: POLST which was signed and dated by provider on 7-21-15.  Document previously scanned on 7-22-15.  Order reviewed and found to be valid.  Code Status reflects choices in most recent ACP document.  Confirmed/documented designated decision maker(s).  Added by Jessica Gregorio RN Advance Care Planning Liaison with [facility]  Advance Care Planning 8/5/2015: Receipt of ACP document:  Received: invalid HCD document dated 2-22-15.  Document not previously scanned.  Validation form completed indicating invalid document. Validation form sent to be scanned as notation of invalid document received.  Code Status reflects choices in most recent ACP document.  Confirmed/documented designated decision maker(s).  Added by Jessica Gregorio RN Advance Care Planning Liaison with [facility]       Diastolic dysfunction 03/12/2012     Priority: Medium     Atherosclerosis of aorta (H) 03/12/2012     Priority: Medium     ECHO 2/2012       GI bleed 07/25/2011     Priority: Medium     EGD-H Pylori, treated, Colonoscopy small Polyp - transfused 3 units of blood.       Allergic rhinitis 07/25/2011     Priority: Medium     Problem list name updated by automated process. Provider to review       Meniere's disease 07/25/2011     Priority: Medium     Problem list name updated by automated process. 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 Provider to review       Essential hypertension 12/06/1999     Priority: Medium     Problem list name updated by automated process. Provider to review          Past Medical History:    Past Medical History:   Diagnosis Date     Allergic rhinitis, cause unspecified 07/25/2011     Aneurysm of splenic artery (H) 03/31/2014     Aortic insufficiency 6/2/2015     ASCVD (arteriosclerotic cardiovascular disease) 08/25/2009     Atherosclerosis of aorta (H) 03/12/2012     Cystocele 03/22/2012     Diastolic dysfunction 03/12/2012     GI bleed 07/25/2011     Hyperlipidemia 12/02/2003     Hypertension 12/06/1999     Mammogram declined 12/20/2012     Meniere's  disease, unspecified 2011     Mitral regurgitation 2015     Osteoarthritis 2000     Sick sinus syndrome (H) 2016     Squamous cell skin cancer, chiquita coelloi 2006     Transient global amnesia 2004       Past Surgical History:    Past Surgical History:   Procedure Laterality Date     APPENDECTOMY       ARTHROSCOPY KNEE      RIGHT - Osteoarthritis     C TOTAL KNEE ARTHROPLASTY      LEFT - Osteoarthritis - Ramírez Ruvalcaba     C TOTAL KNEE ARTHROPLASTY      RIGHT - Osteoarthritis - Ramírez Ruvalcaba     CATARACT EXTRACTION and LENS IMPLANTATION      BILATERAL     COLONOSCOPY  2012     Colonoscopy with Polypectomy  2012    GI BLEED - DR. DEL CASTILLO             REMOVAL OF OVARIAN CYST(S)       HYSTERECTOMY, YANG      Metorrhagia     IMPLANT PACEMAKER  2016     Left Subclavian Line, Triple Lumen      Gastric Ulcer, Dieulfoy lesion, hemostatic clips placed - Massive GI Bleed - Transferred to [facility]     RELEASE CARPAL TUNNEL      RIGHT - Carpal Tunnel Syndrome     REPAIR BLADDER         Family History:    Family History   Problem Relation Age of Onset     Cancer Brother         Pancreatic     Cancer Brother         Prostate     Cancer Sister         Breast     Hypertension Brother      Hypertension Father      Hypertension Sister      Hypertension Mother      Aneurysm Daughter 68        cerebral, sudden death     Osteoporosis Other      Thyroid Disease No family hx of      Diabetes No family hx of        Social History:  Marital Status:   [5]  Social History     Tobacco Use     Smoking status: Never Smoker     Smokeless tobacco: Never Used   Substance Use Topics     Alcohol use: No     Alcohol/week: 0.0 standard drinks     Drug use: No        Medications:    No current outpatient medications on file.        Review of Systems   Constitutional: Negative for fever.   Respiratory: Negative for shortness of breath.    Cardiovascular: Negative for chest pain.    Gastrointestinal: Negative for abdominal pain.   All other systems reviewed and are negative.      Physical Exam   BP: 132/60  Heart Rate: 59  Temp: 97.1  F (36.2  C)  Resp: 16  Height: 152.4 cm (5')  Weight: 51.8 kg (114 lb 3.2 oz)  SpO2: 98 %      Physical Exam  Constitutional:       General: She is not in acute distress.     Appearance: She is not diaphoretic.   HENT:      Head: Atraumatic.      Mouth/Throat:      Pharynx: No oropharyngeal exudate.   Eyes:      General: No scleral icterus.     Pupils: Pupils are equal, round, and reactive to light.   Cardiovascular:      Heart sounds: Normal heart sounds.   Pulmonary:      Effort: No respiratory distress.      Breath sounds: Normal breath sounds.   Abdominal:      General: Bowel sounds are normal.      Palpations: Abdomen is soft.      Tenderness: There is no tenderness.   Musculoskeletal:         General: No tenderness.   Skin:     General: Skin is warm.      Findings: No rash.         ED Course       Procedures         EKG Interpretation:      Interpreted by Elvis Akins MD  Time reviewed: 11:45 AM  Symptoms at time of EKG: Shortness of breath  Rhythm: normal sinus   Rate: normal  Axis: normal  Ectopy: none  Conduction: normal  ST Segments/ T Waves: No ST-T wave changes  Q Waves: none  Comparison to prior: No old EKG available    Clinical Impression: normal EKG         Results for orders placed or performed during the hospital encounter of 09/25/19 (from the past 24 hour(s))   CBC with platelets differential   Result Value Ref Range    WBC 7.5 4.0 - 11.0 10e9/L    RBC Count 3.08 (L) 3.8 - 5.2 10e12/L    Hemoglobin 9.4 (L) 11.7 - 15.7 g/dL    Hematocrit 26.1 (L) 35.0 - 47.0 %    MCV 85 78 - 100 fl    MCH 30.5 26.5 - 33.0 pg    MCHC 36.0 31.5 - 36.5 g/dL    RDW 13.2 10.0 - 15.0 %    Platelet Count 259 150 - 450 10e9/L    Diff Method Automated Method     % Neutrophils 72.9 %    % Lymphocytes 16.6 %    % Monocytes 9.4 %    % Eosinophils 0.3 %    % Basophils  0.3 %    % Immature Granulocytes 0.5 %    Nucleated RBCs 0 0 /100    Absolute Neutrophil 5.4 1.6 - 8.3 10e9/L    Absolute Lymphocytes 1.2 0.8 - 5.3 10e9/L    Absolute Monocytes 0.7 0.0 - 1.3 10e9/L    Absolute Eosinophils 0.0 0.0 - 0.7 10e9/L    Absolute Basophils 0.0 0.0 - 0.2 10e9/L    Abs Immature Granulocytes 0.0 0 - 0.4 10e9/L    Absolute Nucleated RBC 0.0    Lipase   Result Value Ref Range    Lipase 236 73 - 393 U/L   Comprehensive metabolic panel   Result Value Ref Range    Sodium 127 (L) 133 - 144 mmol/L    Potassium 4.4 3.4 - 5.3 mmol/L    Chloride 94 94 - 109 mmol/L    Carbon Dioxide 23 20 - 32 mmol/L    Anion Gap 10 3 - 14 mmol/L    Glucose 105 (H) 70 - 99 mg/dL    Urea Nitrogen 84 (H) 7 - 30 mg/dL    Creatinine 0.85 0.52 - 1.04 mg/dL    GFR Estimate 59 (L) >60 mL/min/[1.73_m2]    GFR Estimate If Black 69 >60 mL/min/[1.73_m2]    Calcium 8.8 8.5 - 10.1 mg/dL    Bilirubin Total 0.3 0.2 - 1.3 mg/dL    Albumin 3.2 (L) 3.4 - 5.0 g/dL    Protein Total 6.4 (L) 6.8 - 8.8 g/dL    Alkaline Phosphatase 47 40 - 150 U/L    ALT 29 0 - 50 U/L    AST 19 0 - 45 U/L   ABO/Rh type and screen   Result Value Ref Range    ABO A     RH(D) Pos     Antibody Screen Neg     Test Valid Only At [facility]        Specimen Expires 09/28/2019    UA reflex to Microscopic and Culture   Result Value Ref Range    Color Urine Straw     Appearance Urine Clear     Glucose Urine Negative NEG^Negative mg/dL    Bilirubin Urine Negative NEG^Negative    Ketones Urine Negative NEG^Negative mg/dL    Specific Gravity Urine 1.016 1.003 - 1.035    Blood Urine Small (A) NEG^Negative    pH Urine 6.0 4.7 - 8.0 pH    Protein Albumin Urine Negative NEG^Negative mg/dL    Urobilinogen mg/dL Normal 0.0 - 2.0 mg/dL    Nitrite Urine Negative NEG^Negative    Leukocyte Esterase Urine Negative NEG^Negative    Source Midstream Urine     RBC Urine <1 0 - 2 /HPF    WBC Urine 2 0 - 5 /HPF    Bacteria Urine Many (A) NEG^Negative /HPF    Squamous Epithelial  /HPF Urine 1 0 - 1 /HPF   D-Dimer ([facility])   Result Value Ref Range    D-Dimer ng/mL 214 0 - 300 ng/ml D-DU   Lactic acid whole blood   Result Value Ref Range    Lactic Acid 1.6 0.7 - 2.0 mmol/L   Troponin I   Result Value Ref Range    Troponin I ES 0.029 0.000 - 0.045 ug/L   Nt probnp inpatient (BNP)   Result Value Ref Range    N-Terminal Pro BNP Inpatient 1,367 0 - 1,800 pg/mL   CT Chest (PE) Abdomen Pelvis w Contrast    Narrative    CT angiogram of the chest  with sagittal coronal MIP reconstructions  CT scan of the abdomen and pelvis with IV contrast with sagittal    HISTORY: Epigastric pain and exertional dyspnea gastrointestinal  bleeding    TECHNIQUE: CT angiogram the chest was performed with separate sagittal  coronal MIP reconstructions. CT scan of the abdomen and pelvis with IV  contrast sagittal coronal reconstructions were obtained.    FINDINGS: CT angiogram of the chest: There are no pulmonary emboli.  Atherosclerotic plaquing is seen in the thoracic aorta. The heart size  is normal. There is a pacemaker in place. There is some linear  scarring seen in both lungs. There are no mediastinal masses or  lymphadenopathy. Axillary and supraclavicular lymph nodes appear  normal. A hiatal hernia is seen.    CT scan of the abdomen and pelvis: The liver is free of masses or  biliary ductal enlargement no calcified gallstones are noted. There is  a 1.3 cm in diameter heavily calcified splenic artery aneurysm. Spleen  and pancreas appear normal. There are no adrenal masses. The right  left kidneys are free of hydronephrosis there is some small cysts seen  in both kidneys. The periaortic lymph nodes are normal in caliber. No  intraperitoneal masses or inflammatory changes are noted. The bladder  appears normal. Is some questionable focal thickening in the rectum.  The possibility of a rectal mass should be considered. There are's  severe arthritic changes of both hips. Degenerative changes are  present in the  thoracic spine. There is a superior endplate  compression of the L2 vertebra unchanged from 2017      Impression    IMPRESSION: CT angiogram reveals no evidence for pulmonary emboli.    CT scan of the abdomen and pelvis reveals some focal thickening in the  rectum the possibility of a rectal mass should be considered. No  intraperitoneal inflammatory changes are noted.    GALLITO HENRY MD   Surgery General IP Consult: Patient to be seen: Routine - within 24 hours; Gi bleeding, ? endoscopy; Consultant may enter orders: Yes; Requesting provider? Hospitalist (if different from attending physician)    Jorge Carr MD     9/25/2019  4:43 PM  [facility] Surgery Consultation    CC:  Melena, epigastric pain     HPI:  This 89 year old year old female is seen at the request of   Dr. Epperson for evaluation of melena and epigastric pain. She   reported her upper abdominal pain began yesterday, then this am   she noted two large black stools. This prompted her to come in   and be evaluated. She does have a history of gastric ulcers and   what sounds like a massive upper GI bleed in 2012. She is no   longer taking a PPI. Denies NSAID use. She is currently in no   pain. She has had her appendix removed. She was treated for h.   Pylori in the past. She has not had any bowel movements since   being admitted.     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     Aortic insufficiency 6/2/2015    moderate, aortic sclerosis without stenosis echo 5/6/2015      ASCVD (arteriosclerotic cardiovascular disease) 08/25/2009    Bare metal stent obtuse margnial     Atherosclerosis of aorta (H) 03/12/2012    ECHO 2/2012     Cystocele 03/22/2012     Diastolic dysfunction 03/12/2012     GI bleed 07/25/2011    EGD-H Pylori,  treated, Colonoscopy small Polyp - transfused 3   units of blood.     Hyperlipidemia 2003     Hypertension 1999     Mammogram declined 2012     Meniere's disease, unspecified 2011     Mitral regurgitation 2015    moderate, echo 2015      Osteoarthritis 2000     Sick sinus syndrome (H) 2016    dual chamber pacemaker placement     Squamous cell skin cancer, ala nasi 2006    MOHS Surgery     Transient global amnesia 2004       Past Surgical History:   Procedure Laterality Date     APPENDECTOMY       ARTHROSCOPY KNEE      RIGHT - Osteoarthritis     C TOTAL KNEE ARTHROPLASTY      LEFT - Osteoarthritis - Ramírez Ruvalcaba     C TOTAL KNEE ARTHROPLASTY      RIGHT - Osteoarthritis - Rmaírez Ruvalcaba     CATARACT EXTRACTION and LENS IMPLANTATION      BILATERAL     COLONOSCOPY       Colonoscopy with Polypectomy      GI BLEED - DR. DEL CASTILLO             REMOVAL OF OVARIAN CYST(S)       HYSTERECTOMY, YANG      Metorrhagia     IMPLANT PACEMAKER  2016     Left Subclavian Line, Triple Lumen      Gastric Ulcer, Dieulfoy lesion, hemostatic clips placed -   Massive GI Bleed - Transferred to [facility]     RELEASE CARPAL TUNNEL      RIGHT - Carpal Tunnel Syndrome     REPAIR BLADDER         Allergies   Allergen Reactions     [place] Juice Other (See Comments), Nausea and GI Disturbance     Vertigo     Food      Oranges.     Naprosyn [Naproxen]      Incontinence       Niacin Swelling       Current Facility-Administered Medications   Medication     dextrose 5% and 0.9% NaCl infusion     famotidine (PEPCID) infusion 20 mg     [START ON 2019] influenza Vac Split High-Dose (FLUZONE)   injection 0.5 mL     isosorbide mononitrate (IMDUR) 24 hr tablet 30 mg     lidocaine (LMX4) kit     lidocaine 1 % 0.1-1 mL     LORazepam (ATIVAN) tablet 0.5 mg     melatonin tablet 1 mg     naloxone (NARCAN) injection 0.1-0.4 mg     ondansetron (ZOFRAN-ODT) ODT  tab 4 mg    Or     ondansetron (ZOFRAN) injection 4 mg     prochlorperazine (COMPAZINE) injection 5 mg    Or     prochlorperazine (COMPAZINE) tablet 5 mg    Or     prochlorperazine (COMPAZINE) Suppository 12.5 mg     sodium chloride (PF) 0.9% PF flush 3 mL     sodium chloride (PF) 0.9% PF flush 3 mL     [START ON 9/26/2019] triamterene-HCTZ (DYAZIDE) 37.5-25 MG per   capsule 1 capsule       HABITS:    Social History     Tobacco Use     Smoking status: Never Smoker     Smokeless tobacco: Never Used   Substance Use Topics     Alcohol use: No     Alcohol/week: 0.0 standard drinks     Drug use: No       Family History   Problem Relation Age of Onset     Cancer Brother         Pancreatic     Cancer Brother         Prostate     Cancer Sister         Breast     Hypertension Brother      Hypertension Father      Hypertension Sister      Hypertension Mother      Aneurysm Daughter 68        cerebral, sudden death     Osteoporosis Other      Thyroid Disease No family hx of      Diabetes No family hx of        REVIEW OF SYSTEMS:  Ten point review of systems negative except   those mentioned in the HPI.     OBJECTIVE:    BP (!) 150/49   Temp 97.2  F (36.2  C) (Tympanic)     Resp 18   Ht 1.524 m (5')   Wt 51.8 kg (114 lb 3.2 oz)     SpO2 99%   BMI 22.30 kg/m      GENERAL: Generally appears well, in no distress with appropriate   affect.  HEENT:   Sclerae anicteric - normocephalic atraumatic   Respiratory:  No acute distress, no splinting   Cardiovascular:  Regular Rate and Rhythm  Abdomen: Soft non-tender non-distended   :  deferred  Extremities:  Extremities normal. No deformities, edema, or skin   discoloration.  Skin:  no suspicious lesions or rashes  Neurological: grossly intact  Psych:  Alert, oriented, affect appropriate with normal decision   making ability.    IMPRESSION:    89 y.o. female with history of upper GI bleed requiring   endoscopic intervention presents with epigastric pain an melena   consistent with  upper GI bleed. CT scan obtained in the ED showed   some rectal wall thickening, Will plan on upper endoscopy and a   look at her rectum tomorrow with a flex sig. I believe this   rectal thickening is likely just stool in her rectum. Will   continue to follow     PLAN:    Upper endoscopy and flex sig tomorrow   Follow [facility]   IV PPI BID order placed      Jorge Freeman MD     9/25/2019  4:28 PM         Hemoglobin   Result Value Ref Range    Hemoglobin 8.6 (L) 11.7 - 15.7 g/dL       Medications   isosorbide mononitrate (IMDUR) 24 hr tablet 30 mg (has no administration in time range)   LORazepam (ATIVAN) tablet 0.5 mg (has no administration in time range)   triamterene-HCTZ (DYAZIDE) 37.5-25 MG per capsule 1 capsule (has no administration in time range)   lidocaine 1 % 0.1-1 mL (has no administration in time range)   lidocaine (LMX4) kit (has no administration in time range)   sodium chloride (PF) 0.9% PF flush 3 mL (has no administration in time range)   sodium chloride (PF) 0.9% PF flush 3 mL (3 mLs Intracatheter Given 9/25/19 1612)   naloxone (NARCAN) injection 0.1-0.4 mg (has no administration in time range)   melatonin tablet 1 mg (has no administration in time range)   dextrose 5% and 0.9% NaCl infusion ( Intravenous New Bag 9/25/19 1611)   ondansetron (ZOFRAN-ODT) ODT tab 4 mg (has no administration in time range)     Or   ondansetron (ZOFRAN) injection 4 mg (has no administration in time range)   prochlorperazine (COMPAZINE) injection 5 mg (has no administration in time range)     Or   prochlorperazine (COMPAZINE) tablet 5 mg (has no administration in time range)     Or   prochlorperazine (COMPAZINE) Suppository 12.5 mg (has no administration in time range)   influenza Vac Split High-Dose (FLUZONE) injection 0.5 mL (has no administration in time range)   pantoprazole (PROTONIX) 40 mg IV push injection (has no administration in time range)   lidocaine (XYLOCAINE) 2 % 15 mL, alum & mag hydroxide-simethicone  (MYLANTA ES/MAALOX  ES) 15 mL GI Cocktail (30 mLs Oral Given 9/25/19 1208)   pantoprazole (PROTONIX) 40 mg IV push injection (40 mg Intravenous Given 9/25/19 1208)   0.9% sodium chloride BOLUS (0 mLs Intravenous Stopped 9/25/19 1423)   sodium chloride (PF) 0.9% PF flush 100 mL (100 mLs Intravenous Given 9/25/19 1254)   iohexol (OMNIPAQUE) 350 mg/mL solution 75 mL (75 mLs Intravenous Given 9/25/19 1254)       Assessments & Plan (with Medical Decision Making)   Gastrointestinal hemorrhage: Presented to the ED with GI bleed.  Has history of upper GI bleed.  CT scan showed region of inflammation in the rectum with suspicion for mass.  General surgeon on-call Dr. Freeman consulted on the phone.  Patient will be admitted under care of hospitalist Dr. Epperson and Dr. Freeman consulted for upper and lower GI scope.  Hyponatremia: Started on IV normal saline and will continue this fluid hydration after admission.  Subsequent management by Dr. Epperson.    I have reviewed the nursing notes.    I have reviewed the findings, diagnosis, plan and need for follow up with the patient.    Current Discharge Medication List          Final diagnoses:   Hyponatremia   Gastrointestinal hemorrhage, unspecified gastrointestinal hemorrhage type   Rectal mass       9/25/2019   [facility]     Evlis Akins MD  09/25/19 [id number]

## 2019-09-25 NOTE — PROGRESS NOTES
Emergency Department Assessment  PCP: Vandana Jones  Specialists or  providers: Cardiology- Radha Jain   Pharmacy: [facility]  Medication routine/concerns: independently manages   Cognitive Behavioral Status: awake, alert and oriented    Affect and Mood Status: pleasant     Problems sleeping: no concerns     Mental Health History: no concerns   Recent Stressors: denies     Case Manger/: not connected     Support System: family   Transportation: self or family     Current Living Situation:    Home Setting: Multi-level   Living Situation:Alone   Function Status/Assistive Device: single end cane    Employment/Financial/Insurance Concerns:retired     Insurance Plan      [facility]   Health Care Directive: yes; on file- identifies Dr. Jones and daughters, Skyla   Previous Services at Home or in the Community:not connected   Falls at home?: denies     ADL's:independent   Equipment at home?: cane   O2: no  Smoker: no   ETOH: no   THC/Drugs: no     Any Violence in the home?: denies   Do you feel safe at home?: yes       Plan: home via family     Has already made arrangements to have her name added to the wait list at the [facility]

## 2019-09-26 ENCOUNTER — ANESTHESIA EVENT (OUTPATIENT)
Dept: SURGERY | Facility: HOSPITAL | Age: 84
DRG: 378 | End: 2019-09-26
Payer: COMMERCIAL

## 2019-09-26 ENCOUNTER — ANESTHESIA (OUTPATIENT)
Dept: SURGERY | Facility: HOSPITAL | Age: 84
DRG: 378 | End: 2019-09-26
Payer: COMMERCIAL

## 2019-09-26 LAB
ANION GAP SERPL CALCULATED.3IONS-SCNC: 8 MMOL/L (ref 3–14)
BACTERIA SPEC CULT: NORMAL
BUN SERPL-MCNC: 60 MG/DL (ref 7–30)
CALCIUM SERPL-MCNC: 8.1 MG/DL (ref 8.5–10.1)
CHLORIDE SERPL-SCNC: 102 MMOL/L (ref 94–109)
CO2 SERPL-SCNC: 25 MMOL/L (ref 20–32)
CREAT SERPL-MCNC: 0.92 MG/DL (ref 0.52–1.04)
GFR SERPL CREATININE-BSD FRML MDRD: 54 ML/MIN/{1.73_M2}
GLUCOSE SERPL-MCNC: 108 MG/DL (ref 70–99)
HGB BLD-MCNC: 7.5 G/DL (ref 11.7–15.7)
HGB BLD-MCNC: 7.6 G/DL (ref 11.7–15.7)
HGB BLD-MCNC: 7.6 G/DL (ref 11.7–15.7)
POTASSIUM SERPL-SCNC: 4 MMOL/L (ref 3.4–5.3)
SODIUM SERPL-SCNC: 135 MMOL/L (ref 133–144)
SPECIMEN SOURCE: NORMAL

## 2019-09-26 PROCEDURE — 25000128 H RX IP 250 OP 636: Performed by: INTERNAL MEDICINE

## 2019-09-26 PROCEDURE — 99100 ANES PT EXTEME AGE<1 YR&>70: CPT | Performed by: NURSE ANESTHETIST, CERTIFIED REGISTERED

## 2019-09-26 PROCEDURE — 40000306 ZZH STATISTIC PRE PROC ASSESS II: Performed by: SURGERY

## 2019-09-26 PROCEDURE — 85018 HEMOGLOBIN: CPT | Performed by: INTERNAL MEDICINE

## 2019-09-26 PROCEDURE — 99232 SBSQ HOSP IP/OBS MODERATE 35: CPT | Mod: 25 | Performed by: INTERNAL MEDICINE

## 2019-09-26 PROCEDURE — 25800030 ZZH RX IP 258 OP 636: Performed by: INTERNAL MEDICINE

## 2019-09-26 PROCEDURE — 36415 COLL VENOUS BLD VENIPUNCTURE: CPT | Performed by: INTERNAL MEDICINE

## 2019-09-26 PROCEDURE — 37000008 ZZH ANESTHESIA TECHNICAL FEE, 1ST 30 MIN: Performed by: SURGERY

## 2019-09-26 PROCEDURE — 88305 TISSUE EXAM BY PATHOLOGIST: CPT | Mod: TC | Performed by: SURGERY

## 2019-09-26 PROCEDURE — C9113 INJ PANTOPRAZOLE SODIUM, VIA: HCPCS | Performed by: SURGERY

## 2019-09-26 PROCEDURE — 25000132 ZZH RX MED GY IP 250 OP 250 PS 637: Performed by: INTERNAL MEDICINE

## 2019-09-26 PROCEDURE — 90662 IIV NO PRSV INCREASED AG IM: CPT | Performed by: INTERNAL MEDICINE

## 2019-09-26 PROCEDURE — 0DB78ZX EXCISION OF STOMACH, PYLORUS, VIA NATURAL OR ARTIFICIAL OPENING ENDOSCOPIC, DIAGNOSTIC: ICD-10-PCS | Performed by: SURGERY

## 2019-09-26 PROCEDURE — 36000050 ZZH SURGERY LEVEL 2 1ST 30 MIN: Performed by: SURGERY

## 2019-09-26 PROCEDURE — 25000125 ZZHC RX 250: Performed by: NURSE ANESTHETIST, CERTIFIED REGISTERED

## 2019-09-26 PROCEDURE — 71000014 ZZH RECOVERY PHASE 1 LEVEL 2 FIRST HR: Performed by: SURGERY

## 2019-09-26 PROCEDURE — 0DJD8ZZ INSPECTION OF LOWER INTESTINAL TRACT, VIA NATURAL OR ARTIFICIAL OPENING ENDOSCOPIC: ICD-10-PCS | Performed by: SURGERY

## 2019-09-26 PROCEDURE — 45330 DIAGNOSTIC SIGMOIDOSCOPY: CPT | Performed by: SURGERY

## 2019-09-26 PROCEDURE — 27210794 ZZH OR GENERAL SUPPLY STERILE: Performed by: SURGERY

## 2019-09-26 PROCEDURE — 80048 BASIC METABOLIC PNL TOTAL CA: CPT | Performed by: INTERNAL MEDICINE

## 2019-09-26 PROCEDURE — 12000000 ZZH R&B MED SURG/OB

## 2019-09-26 PROCEDURE — 25000132 ZZH RX MED GY IP 250 OP 250 PS 637: Performed by: SURGERY

## 2019-09-26 PROCEDURE — 43239 EGD BIOPSY SINGLE/MULTIPLE: CPT | Performed by: NURSE ANESTHETIST, CERTIFIED REGISTERED

## 2019-09-26 PROCEDURE — 25000128 H RX IP 250 OP 636: Performed by: SURGERY

## 2019-09-26 PROCEDURE — 43239 EGD BIOPSY SINGLE/MULTIPLE: CPT | Performed by: SURGERY

## 2019-09-26 PROCEDURE — 99140 ANES COMP EMERGENCY COND: CPT | Performed by: NURSE ANESTHETIST, CERTIFIED REGISTERED

## 2019-09-26 PROCEDURE — 25000128 H RX IP 250 OP 636: Performed by: NURSE ANESTHETIST, CERTIFIED REGISTERED

## 2019-09-26 RX ORDER — PROPOFOL 10 MG/ML
INJECTION, EMULSION INTRAVENOUS PRN
Status: DISCONTINUED | OUTPATIENT
Start: 2019-09-26 | End: 2019-09-26

## 2019-09-26 RX ORDER — LIDOCAINE HYDROCHLORIDE 20 MG/ML
INJECTION, SOLUTION INFILTRATION; PERINEURAL PRN
Status: DISCONTINUED | OUTPATIENT
Start: 2019-09-26 | End: 2019-09-26

## 2019-09-26 RX ORDER — LIDOCAINE 40 MG/G
CREAM TOPICAL
Status: DISCONTINUED | OUTPATIENT
Start: 2019-09-26 | End: 2019-09-26 | Stop reason: HOSPADM

## 2019-09-26 RX ADMIN — INFLUENZA A VIRUS A/MICHIGAN/45/2015 X-275 (H1N1) ANTIGEN (FORMALDEHYDE INACTIVATED), INFLUENZA A VIRUS A/SINGAPORE/INFIMH-16-0019/2016 IVR-186 (H3N2) ANTIGEN (FORMALDEHYDE INACTIVATED), AND INFLUENZA B VIRUS B/MARYLAND/15/2016 BX-69A (A B/COLORADO/6/2017-LIKE VIRUS) ANTIGEN (FORMALDEHYDE INACTIVATED) 0.5 ML: 60; 60; 60 INJECTION, SUSPENSION INTRAMUSCULAR at 11:22

## 2019-09-26 RX ADMIN — DEXTROSE AND SODIUM CHLORIDE: 5; 900 INJECTION, SOLUTION INTRAVENOUS at 05:04

## 2019-09-26 RX ADMIN — LORAZEPAM 0.5 MG: 0.5 TABLET ORAL at 11:21

## 2019-09-26 RX ADMIN — PROPOFOL 10 MG: 10 INJECTION, EMULSION INTRAVENOUS at 14:36

## 2019-09-26 RX ADMIN — PROPOFOL 10 MG: 10 INJECTION, EMULSION INTRAVENOUS at 14:27

## 2019-09-26 RX ADMIN — LIDOCAINE HYDROCHLORIDE 40 MG: 20 INJECTION, SOLUTION INFILTRATION; PERINEURAL at 14:21

## 2019-09-26 RX ADMIN — PANTOPRAZOLE SODIUM 40 MG: 40 INJECTION, POWDER, LYOPHILIZED, FOR SOLUTION INTRAVENOUS at 20:56

## 2019-09-26 RX ADMIN — PROPOFOL 10 MG: 10 INJECTION, EMULSION INTRAVENOUS at 14:39

## 2019-09-26 RX ADMIN — PROPOFOL 10 MG: 10 INJECTION, EMULSION INTRAVENOUS at 14:21

## 2019-09-26 RX ADMIN — PROPOFOL 10 MG: 10 INJECTION, EMULSION INTRAVENOUS at 14:23

## 2019-09-26 RX ADMIN — PROPOFOL 10 MG: 10 INJECTION, EMULSION INTRAVENOUS at 14:31

## 2019-09-26 RX ADMIN — PROPOFOL 10 MG: 10 INJECTION, EMULSION INTRAVENOUS at 14:33

## 2019-09-26 RX ADMIN — ISOSORBIDE MONONITRATE 30 MG: 30 TABLET, EXTENDED RELEASE ORAL at 20:55

## 2019-09-26 RX ADMIN — TRIAMTERENE AND HYDROCHLOROTHIAZIDE 1 CAPSULE: 37.5; 25 CAPSULE ORAL at 09:02

## 2019-09-26 RX ADMIN — PROPOFOL 10 MG: 10 INJECTION, EMULSION INTRAVENOUS at 14:25

## 2019-09-26 RX ADMIN — PROPOFOL 10 MG: 10 INJECTION, EMULSION INTRAVENOUS at 14:29

## 2019-09-26 RX ADMIN — PANTOPRAZOLE SODIUM 40 MG: 40 INJECTION, POWDER, LYOPHILIZED, FOR SOLUTION INTRAVENOUS at 08:57

## 2019-09-26 ASSESSMENT — ACTIVITIES OF DAILY LIVING (ADL)
ADLS_ACUITY_SCORE: 12

## 2019-09-26 ASSESSMENT — COPD QUESTIONNAIRES
COPD: 1
CAT_SEVERITY: MILD

## 2019-09-26 NOTE — PLAN OF CARE
Patient is denying pain and discomfort, and is anticipating surgical procedure today, patient has been NPO, patient denies further bloody stools, at present, patient is denying dizziness and  is adequately making her needs known, ativan P.O administered for generalized anxiety, family is present and are supportive. Patient does ambulate adequately, and is fairly steady on her feet. Vitals as charted.

## 2019-09-26 NOTE — PLAN OF CARE
Reason for hospital stay:  Acute GI bleed  Most recent vitals: /54   Temp 96.5  F (35.8  C) (Tympanic)   Resp 16   Ht 1.524 m (5')   Wt 51.8 kg (114 lb 3.2 oz)   SpO2 97%   BMI 22.30 kg/m    Pain Management:  Denies  Orientation:  A/O  Cardiac:  WDL  Respiratory:  Lung sounds clear denies SOB/dyspnea Sats 97% on room air  GI:  Bowel sounds audible et active x4 no BMs this shift  :  Incontinent of bladder x1  Nutrition:  NPO  Skin Issues:  Intact  IVF:  D5NS 75/hr  ABX:  n/a  Mobility:  SBA 1  Safety:  Alarms audible et actived  Comments:      9/26/2019  5:20 AM  Cierra Cedeno RN  Face to face report given with opportunity to observe patient.    Report given to Nuzhat Cedeno RN   9/26/2019  6:57 AM

## 2019-09-26 NOTE — BRIEF OP NOTE
[facility]    Brief Operative Note    Pre-operative diagnosis: GI bleed [K92.2]  Post-operative diagnosis Duodenal ulcer not currently bleeding, no rectal mass   Procedure: Procedure(s):  UPPER ENDOSCOPY WITH BIOPSY  FLEXIBLE SIGMOIDOSCOPY  Surgeon: Surgeon(s) and Role:     * Jorge Freeman MD - Primary  Anesthesia: Combined MAC with Local   Estimated blood loss: Less than 10 ml  Drains: None  Specimens:   ID Type Source Tests Collected by Time Destination   A :  Biopsy Stomach, Antrum SURGICAL PATHOLOGY EXAM Jorge Freeman MD 9/26/2019  2:33 PM      Findings:   Duodenal ulcer with clean base, no stigmata of recent bleeding, No rectal mass identified. .  Complications: None.  Implants:  * No implants in log *

## 2019-09-26 NOTE — ANESTHESIA CARE TRANSFER NOTE
Patient: Lauren Barron    Procedure(s):  UPPER ENDOSCOPY WITH BIOPSY  FLEXIBLE SIGMOIDOSCOPY    Diagnosis: GI bleed [K92.2]  Diagnosis Additional Information: No value filed.    Anesthesia Type:   MAC     Note:  Airway :Nasal Cannula  Patient transferred to:PACU  Handoff Report: Identifed the Patient, Identified the Reponsible Provider, Reviewed the pertinent medical history, Discussed the surgical course, Reviewed Intra-OP anesthesia mangement and issues during anesthesia, Set expectations for post-procedure period and Allowed opportunity for questions and acknowledgement of understanding      Vitals: (Last set prior to Anesthesia Care Transfer)    CRNA VITALS  9/26/2019 1413 - 9/26/2019 1445      9/26/2019             NIBP:  (!) 83/42    Pulse:  60    NIBP Mean:  58    Ht Rate:  60    SpO2:  100 %    Resp Rate (set):  8                Electronically Signed By: MONIKA Denson CRNA  September 26, 2019  2:45 PM

## 2019-09-26 NOTE — PLAN OF CARE
Returned from PACU at 1525. A&OX3. Daughter at bedside. Pt up in chair. IV to SL. Tolerating clears without diff. Up to bathroom to void.

## 2019-09-26 NOTE — PROGRESS NOTES
[facility]    Hospitalist Progress Note    Date of Service (when I saw the patient): 09/26/2019    Assessment & Plan   Lauren Barron is a 89 year old female with a history of upper GI bleed in the past status post EGD in 2012 with H. pylori positive esophagitis and gastric ulcer, history of coronary artery disease status post stenting, sick sinus syndrome status post pacemaker placement, hypertension, hyperlipidemia who presents with bloody stools x2.     Acute GI bleeding: with resultant acute blood loss anemia.  Patient has h/o bleeding in 2012 and was H. pylori positive from reportedly esophagitis and gastric ulcer.  Per patient history, seems like upper bleed, however CT scan of the abdomen pelvis in the emergency department revealed possible rectal mass.  Hemoglobin dropped from baseline of ~12 to 7.5.  -EGD and flex sig today  -IV PPI  -serial hgb monitoring    CVS: Patient has history of coronary artery disease status post bare-metal stenting.  She also has sick sinus syndrome status post pacemaker placement.  Currently blood pressure and heart rate are within normal limits and stable.  -Continue home Dyazide for hypertension  -Continue home Imdur  -Can probably stop telemetry monitoring after procedures     Hyperlipidemia: Patient refused pharmacological therapy  -No statin prescribed     FEN: N.p.o. status for the time being.  Hyponatremia likely secondary to hypovolemia  -Gentle IV fluids     DVT Prophylaxis: Pneumatic Compression Devices     Code Status: DNR / DNI historically     Disposition: Expected discharge in 1-3 days depending on hemoglobin stability and procedures.    Nupur Epperson MD      Interval History   Patient seen in room.  Hgb continues to drift down, although no bloody BMs here.  Denies any symptoms, no acute events overnight.  Did not sleep well.    -Data reviewed today: I reviewed all new labs and imaging results over the last 24 hours. I personally reviewed no images  or EKG's today.    Physical Exam   Temp: 96.5  F (35.8  C) Temp src: Tympanic BP: 120/54   Heart Rate: 70 Resp: 16 SpO2: 97 % O2 Device: None (Room air)    Vitals:    09/25/19 1458   Weight: 51.8 kg (114 lb 3.2 oz)     Vital Signs with Ranges  Temp:  [96.5  F (35.8  C)-98.6  F (37  C)] 96.5  F (35.8  C)  Heart Rate:  [59-70] 70  Resp:  [16-22] 16  BP: (120-159)/(40-68) 120/54  SpO2:  [92 %-99 %] 97 %    Intake/Output Summary (Last 24 hours) at 9/26/2019 0814  Last data filed at 9/26/2019 0700  Gross per 24 hour   Intake 957 ml   Output 750 ml   Net 207 ml       Peripheral IV 09/25/19 Right Lower forearm (Active)   Site Assessment [facility] 9/25/2019  7:00 PM   Line Status Saline locked;Checked every 1-2 hour 9/25/2019  7:00 PM   Phlebitis Scale 0-->no symptoms 9/25/2019  7:00 PM   Infiltration Scale 0 9/25/2019  7:00 PM   Infiltration Site Treatment Method  None 9/25/2019  3:00 PM   Extravasation? No 9/25/2019  3:00 PM   Dressing Intervention New dressing  9/25/2019  3:00 PM   Number of days: 1       Peripheral IV 09/25/19 Left (Active)   Site Assessment [facility] 9/25/2019  7:00 PM   Line Status Infusing;Checked every 1-2 hour 9/25/2019  7:00 PM   Phlebitis Scale 0-->no symptoms 9/25/2019  7:00 PM   Infiltration Scale 0 9/25/2019  7:00 PM   Infiltration Site Treatment Method  None 9/25/2019  3:00 PM   Extravasation? No 9/25/2019  3:00 PM   Dressing Intervention New dressing  9/25/2019  3:00 PM   Number of days: 1     No line/device    Constitutional - AA, NAD  HEENT - atraumatic, normocephalic  Neck - supple, no masses, no JVD  CVS - S1 S2 RRR, no murmurs, rubs, gallops  Respiratory - CTA b/l  GI - soft, NT, ND, + bowel sounds, no organomegaly  Musculoskeletal - no LE edema, no lesions  Neuro - oriented x 3, no gross focal deficits      Medications     dextrose 5% and 0.9% NaCl 75 mL/hr at 09/26/19 0504       influenza Vac Split High-Dose  0.5 mL Intramuscular Prior to discharge     isosorbide mononitrate  30 mg Oral At  Bedtime     pantoprazole (PROTONIX) IV  40 mg Intravenous BID     sodium chloride (PF)  3 mL Intracatheter Q8H     triamterene-HCTZ  1 capsule Oral Daily       Data   Recent Labs   Lab 09/26/19  0537 09/26/19  0007 09/25/19  1800 09/25/19  1205 09/25/19  1052   WBC  --   --   --   --  7.5   HGB 7.5* 7.6* 8.6*  --  9.4*   MCV  --   --   --   --  85   PLT  --   --   --   --  259     --   --   --  127*   POTASSIUM 4.0  --   --   --  4.4   CHLORIDE 102  --   --   --  94   CO2 25  --   --   --  23   BUN 60*  --   --   --  84*   CR 0.92  --   --   --  0.85   ANIONGAP 8  --   --   --  10   PEARL 8.1*  --   --   --  8.8   *  --   --   --  105*   ALBUMIN  --   --   --   --  3.2*   PROTTOTAL  --   --   --   --  6.4*   BILITOTAL  --   --   --   --  0.3   ALKPHOS  --   --   --   --  47   ALT  --   --   --   --  29   AST  --   --   --   --  19   LIPASE  --   --   --   --  236   TROPI  --   --   --  0.029  --      Lactic Acid   Date Value Ref Range Status   09/25/2019 1.6 0.7 - 2.0 mmol/L Final       Recent Results (from the past 24 hour(s))   CT Chest (PE) Abdomen Pelvis w Contrast    Narrative    CT angiogram of the chest  with sagittal coronal MIP reconstructions  CT scan of the abdomen and pelvis with IV contrast with sagittal    HISTORY: Epigastric pain and exertional dyspnea gastrointestinal  bleeding    TECHNIQUE: CT angiogram the chest was performed with separate sagittal  coronal MIP reconstructions. CT scan of the abdomen and pelvis with IV  contrast sagittal coronal reconstructions were obtained.    FINDINGS: CT angiogram of the chest: There are no pulmonary emboli.  Atherosclerotic plaquing is seen in the thoracic aorta. The heart size  is normal. There is a pacemaker in place. There is some linear  scarring seen in both lungs. There are no mediastinal masses or  lymphadenopathy. Axillary and supraclavicular lymph nodes appear  normal. A hiatal hernia is seen.    CT scan of the abdomen and pelvis: The liver  is free of masses or  biliary ductal enlargement no calcified gallstones are noted. There is  a 1.3 cm in diameter heavily calcified splenic artery aneurysm. Spleen  and pancreas appear normal. There are no adrenal masses. The right  left kidneys are free of hydronephrosis there is some small cysts seen  in both kidneys. The periaortic lymph nodes are normal in caliber. No  intraperitoneal masses or inflammatory changes are noted. The bladder  appears normal. Is some questionable focal thickening in the rectum.  The possibility of a rectal mass should be considered. There are's  severe arthritic changes of both hips. Degenerative changes are  present in the thoracic spine. There is a superior endplate  compression of the L2 vertebra unchanged from 2017      Impression    IMPRESSION: CT angiogram reveals no evidence for pulmonary emboli.    CT scan of the abdomen and pelvis reveals some focal thickening in the  rectum the possibility of a rectal mass should be considered. No  intraperitoneal inflammatory changes are noted.    MD Nupur VILLEDA MD, MD

## 2019-09-26 NOTE — PLAN OF CARE
Face to face report given with opportunity to observe patient.    Report given to Pau Davis RN   9/26/2019  3:27 PM

## 2019-09-26 NOTE — PLAN OF CARE
VSS, no c/o SOB.  Pt is A&O except when wakes up, she has gotten up out of the bed to go to  the BR because she thought she was at home.  When she heard the bed alarm go off she knew she was here at the hospital.  Hgb recheck at 1800 was 8.6.  Dr. Freeman plans to do a rectal exam tomorrow and possibly an endoscopy.  She has not had any stools this shift.  No c/o pain, dizziness or nausea.  Has received 1 dose Protonix iv.  Pt gets up to BR with her cane and standby asst and stable on feet.  She has been NPO with few ice chips.  Had SCD's on but requested they be taken off at 2300 during bedside report, says she is too warm and needs a break from wearing them- SCD's removed.Has D5 NS infusing at 75 mls/hr. Bed alarm on and call light in reach.     Face to face report given with opportunity to observe patient.    Report given to Cierra Varela RN   9/25/2019  11:45 PM

## 2019-09-26 NOTE — ANESTHESIA PREPROCEDURE EVALUATION
Anesthesia Pre-Procedure Evaluation    Patient: Lauren Barron   MRN: [id number] : 1926          Preoperative Diagnosis: GI bleed [K92.2]    Procedure(s):  UPPER ENDOSCOPY  FLEXIBLE SIGMOIDOSCOPY    Past Medical History:   Diagnosis Date     Allergic rhinitis, cause unspecified 2011     Aneurysm of splenic artery (H) 2014    essentially normal for age, heavily calcified, normal per Dr Connor, no further follow up of this needed.     Aortic insufficiency 2015    moderate, aortic sclerosis without stenosis echo 2015      ASCVD (arteriosclerotic cardiovascular disease) 2009    Bare metal stent obtuse margnial     Atherosclerosis of aorta (H) 2012    ECHO 2012     Cystocele 2012     Diastolic dysfunction 2012     GI bleed 2011    EGD-H Pylori, treated, Colonoscopy small Polyp - transfused 3 units of blood.     Hyperlipidemia 2003     Hypertension 1999     Mammogram declined 2012     Meniere's disease, unspecified 2011     Mitral regurgitation 2015    moderate, echo 2015      Osteoarthritis 2000     Sick sinus syndrome (H) 2016    dual chamber pacemaker placement     Squamous cell skin cancer, ala nasi 2006    MOHS Surgery     Transient global amnesia 2004     Past Surgical History:   Procedure Laterality Date     APPENDECTOMY       ARTHROSCOPY KNEE      RIGHT - Osteoarthritis     C TOTAL KNEE ARTHROPLASTY      LEFT - Osteoarthritis - [facility]     C TOTAL KNEE ARTHROPLASTY      RIGHT - Osteoarthritis - [facility]     CATARACT EXTRACTION and LENS IMPLANTATION      BILATERAL     COLONOSCOPY       Colonoscopy with Polypectomy      GI BLEED - DR. DEL CASTILLO             REMOVAL OF OVARIAN CYST(S)       HYSTERECTOMY, YANG      Metorrhagia     IMPLANT PACEMAKER  2016     Left Subclavian Line, Triple Lumen      Gastric Ulcer, Dieulfoy lesion, hemostatic  clips placed - Massive GI Bleed - Transferred to [facility] CARPAL TUNNEL      RIGHT - Carpal Tunnel Syndrome     REPAIR BLADDER         Anesthesia Evaluation     . Pt has had prior anesthetic. Type: General and MAC           ROS/MED HX    ENT/Pulmonary:     (+)allergic rhinitis, mild COPD, , . Other pulmonary disease pulmonary fibrosis.    Neurologic:     (+)other neuro transient global ischemia, meniere's disease    Cardiovascular: Comment: Splenic artery aneurysm, diastolic dysfunction    (+) Dyslipidemia, hypertension--CAD, -past MI,-stent,2009  1 . : . . . pacemaker Reason placed: sick sinus syndrome:. valvular problems/murmurs type: MR and AI . Previous cardiac testing Echodate:5/5/15results:Normal left ventricular size and systolic function. Ejection  fraction around 65%.  2. Left atrial enlargement.  3. Borderline right ventricular enlargement without evidence of right  ventricular pressure overload.  4. Moderate mitral regurgitation.  5. Aortic sclerosis without stenosis, but moderate aortic  insufficiency.  6. Mild tricuspid regurgitation. Peak systolic pressure of the right  ventricle is estimated at 26 plus right atrium.  7.  There is evidence of diastolic dysfunction.date: results:ECG reviewed date:9/25/19 results: date: results:          METS/Exercise Tolerance:  3 - Able to walk 1-2 blocks without stopping   Hematologic:     (+) Anemia, Other Hematologic Disorder-thrombocytopenia      Musculoskeletal:   (+) arthritis,  -       GI/Hepatic:     (+) hiatal hernia, Other GI/Hepatic gi bleed      Renal/Genitourinary:     (+) chronic renal disease,       Endo:  - neg endo ROS       Psychiatric:  - neg psychiatric ROS       Infectious Disease:  - neg infectious disease ROS       Malignancy:   (+) Malignancy History of Skin  Skin CA Remission status post Surgery,         Other:    - neg other ROS                      Physical Exam  Normal systems: cardiovascular and pulmonary    Airway   Mallampati:  II  TM distance: >3 FB  Neck ROM: full    Dental   (+) upper dentures    Cardiovascular   Rhythm and rate: regular and normal      Pulmonary    breath sounds clear to auscultation            Lab Results   Component Value Date    WBC 7.5 09/25/2019    HGB 7.6 (L) 09/26/2019    HCT 26.1 (L) 09/25/2019     09/25/2019    SED 23 10/22/2009     09/26/2019    POTASSIUM 4.0 09/26/2019    CHLORIDE 102 09/26/2019    CO2 25 09/26/2019    BUN 60 (H) 09/26/2019    CR 0.92 09/26/2019     (H) 09/26/2019    PEARL 8.1 (L) 09/26/2019    ALBUMIN 3.2 (L) 09/25/2019    PROTTOTAL 6.4 (L) 09/25/2019    ALT 29 09/25/2019    AST 19 09/25/2019    ALKPHOS 47 09/25/2019    BILITOTAL 0.3 09/25/2019    LIPASE 236 09/25/2019    AMYLASE 54 03/17/2014    INR 1.36 (H) 10/26/2009    TSH 1.84 10/29/2018    HCG (A) 10/22/2009     (Removed)   Test canceled - Lab  error  CORRECTED ON 10/22 AT 1015: PREVIOUSLY REPORTED AS Negative This test provides a   presumptive diagnosis of pregnancy or non pregnancy. A confirmed pregnancy   diagnosis should only be made by a physician after all clinical and laboratory   findings have been evaluated.       Preop Vitals  BP Readings from Last 3 Encounters:   09/26/19 140/52   07/22/19 138/60   06/25/19 124/58    Pulse Readings from Last 3 Encounters:   07/22/19 61   06/25/19 78   05/21/19 60      Resp Readings from Last 3 Encounters:   09/26/19 18   06/25/19 19   05/21/19 20    SpO2 Readings from Last 3 Encounters:   09/26/19 99%   07/22/19 98%   06/25/19 98%      Temp Readings from Last 1 Encounters:   09/26/19 97.2  F (36.2  C) (Tympanic)    Ht Readings from Last 1 Encounters:   09/25/19 1.524 m (5')      Wt Readings from Last 1 Encounters:   09/25/19 51.8 kg (114 lb 3.2 oz)    Estimated body mass index is 22.3 kg/m  as calculated from the following:    Height as of this encounter: 1.524 m (5').    Weight as of this encounter: 51.8 kg (114 lb 3.2 oz).       Anesthesia Plan      History &  Physical Review  History and physical reviewed and following examination; no interval change.    ASA Status:  4 emergent.    NPO Status:  > 8 hours    Plan for MAC with Intravenous and Propofol induction. Maintenance will be TIVA.  Reason for MAC:  Deep or markedly invasive procedure (G8)    Risks and benefits of MAC anesthetic discussed including dental damage, aspiration, loss of airway, conversion to general anesthetic, CV complications, MI, stroke, death. Pt wishes to proceed.       Postoperative Care      Consents  Anesthetic plan, risks, benefits and alternatives discussed with:  Patient.  Use of blood products discussed: Yes.   Use of blood products discussed with Patient.  Consented to blood products.  .                 MONIKA Denson CRNA

## 2019-09-26 NOTE — PROGRESS NOTES
Checked in with Lauren. Alert, oriented, pleasant and conversational. BRITTON Graham completed an assessment in ED.  Daughters in room. Lauren was sitting up in chair. Stated she felt good. Denies any concerns or questions. Completed Medicare IMM letter. Stated she is having a procedure this afternoon. Stated she plans to return home when discharged. Denied any needs. CTS will remain available.

## 2019-09-26 NOTE — PLAN OF CARE
Patient down for her surgical procedure, nurse to nurse report given to Magy COLLADO in same day surgery.

## 2019-09-26 NOTE — PROGRESS NOTES
Continued to drift down of hbg, remains hemodynamically stable, no bowel movements, Will plan on EGD today as well as look at her rectum, discussed with patient and she is in agreement .    Jorge Freeman MD

## 2019-09-26 NOTE — ANESTHESIA POSTPROCEDURE EVALUATION
Patient: Lauren Barron    Procedure(s):  UPPER ENDOSCOPY WITH BIOPSY  FLEXIBLE SIGMOIDOSCOPY    Diagnosis:GI bleed [K92.2]  Diagnosis Additional Information: No value filed.    Anesthesia Type:  MAC    Note:  Anesthesia Post Evaluation    Patient location during evaluation: Phase 2  Patient participation: Able to fully participate in evaluation  Level of consciousness: awake and alert  Pain management: adequate  Airway patency: patent  Cardiovascular status: acceptable  Respiratory status: acceptable  Hydration status: stable  PONV: none     Anesthetic complications: None          Last vitals:  Vitals:    09/26/19 1515 09/26/19 1548 09/26/19 1615   BP: 122/54 134/61    Resp: 16 18    Temp:  96.2  F (35.7  C)    SpO2: 100% 100% 98%         Electronically Signed By: MONIKA Morgan CRNA  September 26, 2019  5:31 PM

## 2019-09-27 VITALS
BODY MASS INDEX: 22.42 KG/M2 | RESPIRATION RATE: 16 BRPM | WEIGHT: 114.2 LBS | TEMPERATURE: 97.4 F | SYSTOLIC BLOOD PRESSURE: 158 MMHG | OXYGEN SATURATION: 99 % | HEIGHT: 60 IN | DIASTOLIC BLOOD PRESSURE: 61 MMHG

## 2019-09-27 LAB
ANION GAP SERPL CALCULATED.3IONS-SCNC: 7 MMOL/L (ref 3–14)
BUN SERPL-MCNC: 32 MG/DL (ref 7–30)
CALCIUM SERPL-MCNC: 8.4 MG/DL (ref 8.5–10.1)
CHLORIDE SERPL-SCNC: 105 MMOL/L (ref 94–109)
CO2 SERPL-SCNC: 26 MMOL/L (ref 20–32)
COPATH REPORT: NORMAL
CREAT SERPL-MCNC: 0.95 MG/DL (ref 0.52–1.04)
GFR SERPL CREATININE-BSD FRML MDRD: 51 ML/MIN/{1.73_M2}
GLUCOSE SERPL-MCNC: 91 MG/DL (ref 70–99)
HGB BLD-MCNC: 7.2 G/DL (ref 11.7–15.7)
POTASSIUM SERPL-SCNC: 4 MMOL/L (ref 3.4–5.3)
SODIUM SERPL-SCNC: 138 MMOL/L (ref 133–144)

## 2019-09-27 PROCEDURE — 99239 HOSP IP/OBS DSCHRG MGMT >30: CPT | Mod: 24 | Performed by: INTERNAL MEDICINE

## 2019-09-27 PROCEDURE — 80048 BASIC METABOLIC PNL TOTAL CA: CPT | Performed by: SURGERY

## 2019-09-27 PROCEDURE — 25000128 H RX IP 250 OP 636: Performed by: SURGERY

## 2019-09-27 PROCEDURE — C9113 INJ PANTOPRAZOLE SODIUM, VIA: HCPCS | Performed by: SURGERY

## 2019-09-27 PROCEDURE — 25000132 ZZH RX MED GY IP 250 OP 250 PS 637: Performed by: SURGERY

## 2019-09-27 PROCEDURE — 36415 COLL VENOUS BLD VENIPUNCTURE: CPT | Performed by: SURGERY

## 2019-09-27 PROCEDURE — 85018 HEMOGLOBIN: CPT | Performed by: SURGERY

## 2019-09-27 RX ORDER — PANTOPRAZOLE SODIUM 40 MG/1
40 TABLET, DELAYED RELEASE ORAL 2 TIMES DAILY
Qty: 60 TABLET | Refills: 0 | Status: SHIPPED | OUTPATIENT
Start: 2019-09-27 | End: 2019-11-08

## 2019-09-27 RX ADMIN — PANTOPRAZOLE SODIUM 40 MG: 40 INJECTION, POWDER, LYOPHILIZED, FOR SOLUTION INTRAVENOUS at 09:24

## 2019-09-27 RX ADMIN — TRIAMTERENE AND HYDROCHLOROTHIAZIDE 1 CAPSULE: 37.5; 25 CAPSULE ORAL at 09:24

## 2019-09-27 ASSESSMENT — ACTIVITIES OF DAILY LIVING (ADL)
ADLS_ACUITY_SCORE: 12

## 2019-09-27 NOTE — DISCHARGE SUMMARY
[facility]    Discharge Summary  Hospitalist    Date of Admission:  9/25/2019  Date of Discharge:  9/27/2019  Discharging Provider: Nupur Epperson MD  Date of Service (when I saw the patient): 09/27/19    Discharge Diagnoses   Active Problems:    Acute GI bleeding (9/25/2019)    Acute blood loss anemia    Duodenal ulcer    CAD s/p BMS, no outpatient antiplatelet agent    HTN    HLD, no statin therapy      History of Present Illness   Lauren Barron is an 89 year old female who presented with GI bleeding.  Please see admission H+P for additional details.    Hospital Course   Lauren Barron was admitted on 9/25/2019.  She is a 89 year old female with a history of upper GI bleed in the past status post EGD in 2012 with H. pylori positive esophagitis and gastric ulcer, history of coronary artery disease status post stenting, sick sinus syndrome status post pacemaker placement, hypertension, hyperlipidemia who presents with bloody stools x2.  She had resultant acute blood loss anemia with hemoglobin down as far as 7.2.  CT scan of the abdomen pelvis on admission showed possible rectal mass.  She did have EGD performed on 9/26/2019 which showed presence of a duodenal ulcer with clean base.  No active bleeding was seen.  She also had a flex sig performed on 9/26/2019 which did not reveal any evidence of a rectal mass.  She was treated with IV proton pump inhibitor and serial hemoglobin monitoring.  Her hemoglobin did appear to stabilize in the low 7 range.  She had no further bleeding, and she is currently asymptomatic and ambulating without difficulty.  She is on the cusp of needing a transfusion, however with her being asymptomatic at this time, we will hold off on this.  We will have her follow-up with her primary care physician as soon as possible with a hemoglobin check.  She does not take any outpatient antiplatelet or blood thinning medications despite her history of bare-metal stenting.    Nupur  Beth Epperson MD      Significant Results and Procedures   See below.    Pending Results   These results will be followed up by Vandana Jones    Unresulted Labs Ordered in the Past 30 Days of this Admission     Date and Time Order Name Status Description    9/26/2019 1433 Surgical pathology exam In process           Code Status   DNR historically       Primary Care Physician   Vandana Jones    Physical Exam   Temp: 97.4  F (36.3  C) Temp src: Tympanic BP: 158/61   Heart Rate: 66 Resp: 16 SpO2: 99 % O2 Device: None (Room air) Oxygen Delivery: 2 LPM  Vitals:    09/25/19 1458   Weight: 51.8 kg (114 lb 3.2 oz)     Vital Signs with Ranges  Temp:  [96.2  F (35.7  C)-99.2  F (37.3  C)] 97.4  F (36.3  C)  Heart Rate:  [60-67] 66  Resp:  [16-22] 16  BP: (100-158)/(40-64) 158/61  SpO2:  [94 %-100 %] 99 %  I/O last 3 completed shifts:  In: 750 [P.O.:120; I.V.:630]  Out: 1470 [Urine:1470]    Constitutional: AA, NAD  Eyes: PERRLA, no injection, no icterus  HEENT: atraumatic, normocephalic  Respiratory: CTA b/l  Cardiovascular: S1 S2 RRR, systolic murmur  GI: soft, NT, ND, + bowel sounds  Lymph/Hematologic: no palpable lymphadenopathy  Skin: no rashes, no lesions  Musculoskeletal: No edema, good tone, no deformities  Neurologic: oriented x 3, no focal deficits  Psychiatric: appropriate affect    Discharge Disposition   Discharged to home  Condition at discharge: Stable    Consultations This Hospital Stay   SURGERY GENERAL IP CONSULT  PHYSICAL THERAPY ADULT IP CONSULT    Time Spent on this Encounter   Nupur LANDRY MD, MD, personally saw the patient today and spent greater than 30 minutes discharging this patient.    Discharge Orders      **Hemoglobin FUTURE anytime    Last Lab Result: Hemoglobin (g/dL)       Date                     Value                 09/27/2019               7.2 (L)          ----------     Reason for your hospital stay    GI bleeding     Follow-up and recommended labs and tests     Follow up with primary  care provider, Vandana Jones, within 7 days for hospital follow- up.  The following labs/tests are recommended: hgb.     Activity    Your activity upon discharge: activity as tolerated     When to contact your care team    Call your primary doctor if you have any of the following: more GI bleeding.     Diet    Follow this diet upon discharge: Orders Placed This Encounter      Regular Diet Adult     Discharge Medications   Current Discharge Medication List      START taking these medications    Details   pantoprazole (PROTONIX) 40 MG EC tablet Take 1 tablet (40 mg) by mouth 2 times daily  Qty: 60 tablet, Refills: 0    Associated Diagnoses: Acute GI bleeding         CONTINUE these medications which have NOT CHANGED    Details   Ascorbic Acid (VITAMIN C) 500 MG CAPS Take 500 mg by mouth daily      calcium carbonate (OS-PEARL) 1500 (600 Ca) MG tablet Take 600 mg by mouth daily      isosorbide mononitrate (IMDUR) 30 MG 24 hr tablet TAKE ONE TABLET BY MOUTH AT BEDTIME.  Qty: 90 tablet, Refills: 3    Associated Diagnoses: Chronic ischemic heart disease      LORazepam (ATIVAN) 0.5 MG tablet TAKE 1 TABLET BY MOUTH EVERY 8 HOURS AS NEEDED FOR ANXIETY  Qty: 30 tablet, Refills: 0    Associated Diagnoses: Anxiety      Multiple Vitamins-Minerals (MULTIVITAMIN ADULT PO) Take 200 mg by mouth daily      potassium 99 MG TABS Take 99 mg by mouth daily      STATIN NOT PRESCRIBED, INTENTIONAL, Statin not prescribed intentionally due to Other patient declines (This option does not exclude patient from measure)  Qty: 0 each, Refills: 0    Associated Diagnoses: Essential hypertension with goal blood pressure less than 140/90      triamterene-HCTZ (DYAZIDE) 37.5-25 MG capsule Take 1 capsule by mouth daily  Qty: 90 capsule, Refills: 3    Associated Diagnoses: Benign essential hypertension      acetaminophen (TYLENOL) 500 MG tablet Take 2 tablet (1000mg) by oral route every 6 hours as needed  Qty: 100 tablet, Refills: 2    Associated Diagnoses:  Other unknown and unspecified cause of morbidity or mortality      nitroGLYcerin (NITROSTAT) 0.4 MG sublingual tablet Place 1 tablet (0.4mg) by sublingual route at the first sign of attack; may repeat ever 5 min until relief; if pain persists after 3 tablets in 15 minutes prompt medical attention is recommended. AS NEEDED  Qty: 25 tablet, Refills: 3    Associated Diagnoses: Chronic ischemic heart disease, unspecified           Allergies   Allergies   Allergen Reactions     Park Juice Other (See Comments), Nausea and GI Disturbance     Vertigo     Food      Oranges.     Naprosyn [Naproxen]      Incontinence       Niacin Swelling     Data   Most Recent 3 CBC's:  Recent Labs   Lab Test 09/27/19  0523 09/26/19  1204 09/26/19  0537  09/25/19  1052 10/29/18  1139 11/20/17  1402   WBC  --   --   --   --  7.5 6.9 8.0   HGB 7.2* 7.6* 7.5*   < > 9.4* 11.9 11.7   MCV  --   --   --   --  85 86 87   PLT  --   --   --   --  259 286 246    < > = values in this interval not displayed.      Most Recent 3 BMP's:  Recent Labs   Lab Test 09/27/19  0523 09/26/19  0537 09/25/19  1052    135 127*   POTASSIUM 4.0 4.0 4.4   CHLORIDE 105 102 94   CO2 26 25 23   BUN 32* 60* 84*   CR 0.95 0.92 0.85   ANIONGAP 7 8 10   PEARL 8.4* 8.1* 8.8   GLC 91 108* 105*     Most Recent 2 LFT's:  Recent Labs   Lab Test 09/25/19  1052 10/29/18  1139   AST 19 26   ALT 29 35   ALKPHOS 47 54   BILITOTAL 0.3 0.4     Most Recent INR's and Anticoagulation Dosing History:  Anticoagulation Dose History     Recent Dosing and Labs Latest Ref Rng & Units 10/23/2009 10/24/2009 10/25/2009 10/26/2009    INR 0.86 - 1.14 1.09 1.15(H) 1.37(H) 1.36(H)        Most Recent 3 Troponin's:  Recent Labs   Lab Test 09/25/19  1205 11/20/15  1650 05/05/15  1015   TROPI 0.029 0.019 <0.015  The 99th percentile for upper reference range is 0.045 ug/L.  Troponin values in   the range of 0.045 - 0.120 ug/L may be associated with risks of adverse   clinical events.       Most Recent  Cholesterol Panel:  Recent Labs   Lab Test 12/05/16  1000   CHOL 227*   *   HDL 54   TRIG 271*     Most Recent 6 Bacteria Isolates From Any Culture (See EPIC Reports for Culture Details):  Recent Labs   Lab Test 09/25/19  1633 10/29/18  1145 05/05/15  1411   CULT No MRSA isolated >100,000 colonies/mL  Klebsiella oxytoca  * No MRSA isolated     Most Recent TSH, T4 and A1c Labs:  Recent Labs   Lab Test 10/29/18  1140   TSH 1.84     Results for orders placed or performed during the hospital encounter of 09/25/19   CT Chest (PE) Abdomen Pelvis w Contrast    Narrative    CT angiogram of the chest  with sagittal coronal MIP reconstructions  CT scan of the abdomen and pelvis with IV contrast with sagittal    HISTORY: Epigastric pain and exertional dyspnea gastrointestinal  bleeding    TECHNIQUE: CT angiogram the chest was performed with separate sagittal  coronal MIP reconstructions. CT scan of the abdomen and pelvis with IV  contrast sagittal coronal reconstructions were obtained.    FINDINGS: CT angiogram of the chest: There are no pulmonary emboli.  Atherosclerotic plaquing is seen in the thoracic aorta. The heart size  is normal. There is a pacemaker in place. There is some linear  scarring seen in both lungs. There are no mediastinal masses or  lymphadenopathy. Axillary and supraclavicular lymph nodes appear  normal. A hiatal hernia is seen.    CT scan of the abdomen and pelvis: The liver is free of masses or  biliary ductal enlargement no calcified gallstones are noted. There is  a 1.3 cm in diameter heavily calcified splenic artery aneurysm. Spleen  and pancreas appear normal. There are no adrenal masses. The right  left kidneys are free of hydronephrosis there is some small cysts seen  in both kidneys. The periaortic lymph nodes are normal in caliber. No  intraperitoneal masses or inflammatory changes are noted. The bladder  appears normal. Is some questionable focal thickening in the rectum.  The  possibility of a rectal mass should be considered. There are's  severe arthritic changes of both hips. Degenerative changes are  present in the thoracic spine. There is a superior endplate  compression of the L2 vertebra unchanged from 2017      Impression    IMPRESSION: CT angiogram reveals no evidence for pulmonary emboli.    CT scan of the abdomen and pelvis reveals some focal thickening in the  rectum the possibility of a rectal mass should be considered. No  intraperitoneal inflammatory changes are noted.    GALLITO HENRY MD

## 2019-09-27 NOTE — PLAN OF CARE
Patient discharged at 11:22 AM via wheel chair accompanied by daughter and staff. Prescriptions sent to patients preferred pharmacy. All belongings sent with patient.     Discharge instructions reviewed with patient and daughter. Listed belongings gathered and returned to patient. yes    Patient discharged to home.   Report called to N/A    Core Measures and Vaccines  Core Measures applicable during stay: No. If yes, state diagnosis: N/A  Pneumonia and Influenza given prior to discharge, if indicated: N/A    Surgical Patient   Surgical Procedures during stay: yes  Did patient receive discharge instruction on wound care and recognition of infection symptoms? Yes    MISC  Follow up appointment made:  Yes  Home and hospital aquired medications returned to patient: N/A  Patient reports pain was well managed at discharge: Yes

## 2019-09-27 NOTE — TELEPHONE ENCOUNTER
ativan      Last Written Prescription Date:  5/21/19  Last Fill Quantity: 30,   # refills: 0  Last Office Visit: 7/22/19  Future Office visit:    Next 5 appointments (look out 90 days)    Oct 03, 2019 10:45 AM CDT  (Arrive by 10:30 AM)  SHORT with Vandana Jones MD  [facility] ([facility] ) [address]  [place] [address]  [phone]           Routing refill request to provider for review/approval because:  Drug not on the [facility], [facility] or [facility] refill protocol or controlled substance

## 2019-09-27 NOTE — PROGRESS NOTES
Name: Lauren Barron    MRN#: [id number]    Reason for Hospitalization: Hyponatremia [E87.1]  Rectal mass [K62.9]  Gastrointestinal hemorrhage, unspecified gastrointestinal hemorrhage type [K92.2]    JOSEFA: average     Discharge Date: 9/27/2019    Patient / Family response to discharge plan: agreeable     Follow-Up Appt:   Future Appointments   Date Time Provider Department Center   10/3/2019 10:45 AM Vandana Jones MD [facility]   1/23/2020 10:45 AM Vandana Jones MD [facility]       Other Providers (Care Coordinator, County Services, PCA services etc): No    Discharge Disposition: home via daughter, Yesica     RA[facility] [place]

## 2019-09-27 NOTE — PLAN OF CARE
No falls or injuries this shift. Returned to room from PACU at 1525. Denies GI distress. No nausea. No stools this shift. Voiding without diff. Diet: clears.  Swallows without diff. SL X2. IV protonix BID. Family here much of evening.     Face to face report given with opportunity to observe patient.    Report given to Cierra Monterroso RN   9/26/2019  11:17 PM

## 2019-09-27 NOTE — PLAN OF CARE
Reason for hospital stay:  Acute GI Bleeding  Most recent vitals: BP (!) 116/48   Temp 96.9  F (36.1  C) (Tympanic)   Resp 16   Ht 1.524 m (5')   Wt 51.8 kg (114 lb 3.2 oz)   SpO2 98%   BMI 22.30 kg/m    Pain Management:  Denies pain  Orientation:  A/O  Cardiac:  WDL   Respiratory:  Lung sounds clear bilat sats 98% on room air denies weakness or dizziness or SOB at rest or exertion  GI:  Bowel sounds audible et active x4 no BMs on this shift et denies n/v  :  Urinary incontinence at times from frequency/dribbling  Skin Issues:  Intact  IVF:  Saline locked   ABX:  n/a  Mobility:  SBA 1 for ambulation, very impulsive et does not use call light for assist   Safety:  Alarms audible et active  Comments:      9/27/2019  5:44 AM  Cierra Cedeno RN  Face to face report given with opportunity to observe patient.    Report given to Jacqui Cedeno RN   9/27/2019  6:57 AM

## 2019-09-28 NOTE — OP NOTE
Lauren Barron MRN# [id number]   YOB: 1926 Age: 89 year old      Date of Admission:  9/25/2019    Primary care provider: Vandana Jones    PREOPERATIVE DIAGNOSIS:  Anemia       POSTOPERATIVE DIAGNOSES:    Duodenal ulcer, GI bleed with Melena, suspect due to Duodenal ulcer      PROCEDURE:  Esophagogastroduodenoscopy with cold forceps biopsies. Flexible sigmoidoscopy       HISTORY OF PRESENT ILLNESS:  See consult note     OPERATIVE FINDINGS:  There was a moderate size hiatal hernia. There was noted to be a duodenal ulcer likely second portion of duodenum at 6 o'clock position, clean base. Some surrounding inflammation but no stigmata of bleeding.     Specimen(s) submitted to pathology:  Antral biopsies    PROCEDURE:  With the patient in the supine position on the transport cart, IV sedation was administered by the nurse anesthetist.  Her correct identity and planned procedure were confirmed during a requisite timeout pause.  A bite block was placed and the fiberoptic endoscope was introduced and negotiated through the cricopharyngeus without difficulty.  The length of the esophagus was examined without findings. There was a moderate size hiatal hernia. There was noted to be a duodenal ulcer likely second portion of duodenum at 6 o'clock position, clean base. Some surrounding inflammation but no stigmata of bleeding.        Random cold forceps biopsies were obtained of the antrum for H. pylori.  Hemostasis was judged adequate on visualization.   The endoscope was returned to the GE junction.        Then our attention turned to the flexible sigmoidoscopy portion. She was noted to have enlarged hemorrhoids. There was no mass on HELDER. Then using the scope the scope was advanced to the proximal sigmoid colon as noted by some diverticulum. There was no mucosal abnormality noted in the rectum. There was noted to be dark stool. Tolerated the procedure without issue.     Jorge Freeman MD  9/27/2019  8:40  PM

## 2019-09-29 RX ORDER — LORAZEPAM 0.5 MG/1
TABLET ORAL
Qty: 30 TABLET | Refills: 0 | Status: SHIPPED | OUTPATIENT
Start: 2019-09-29 | End: 2020-03-06

## 2019-09-30 ENCOUNTER — TELEPHONE (OUTPATIENT)
Dept: CASE MANAGEMENT | Facility: HOSPITAL | Age: 84
End: 2019-09-30

## 2019-09-30 NOTE — TELEPHONE ENCOUNTER
Lauren Barron, was discharged to home on 9/27 from [facility]. I spoke today with her regarding the patient's discharge.   She indicates she has receive(d) sufficient information upon discharge. Medications were reviewed in full on discharge, including: Medications to be started; medications to be stopped; medications to be continued from preadmission and any side effects. Prescriptions were received at discharge and were able to be filled. Medications are being taken as prescribed.   She indicates they have an appointment scheduled for 10/3/19 and have transportation available. They are able to confirm their appointment time and have it written down.   Questions regarding discharge instructions or condition have been answered.  Per their request, the following employee (s) can be recognized for their outstanding services delivered:  [employer]!!   Suggestions to improve service: no   She was informed they may receive a survey in the mail from the hospital. Asked if they would kindly complete the survey in order for [facility] to know if services fully met patient needs.

## 2019-10-03 ENCOUNTER — OFFICE VISIT (OUTPATIENT)
Dept: FAMILY MEDICINE | Facility: OTHER | Age: 84
End: 2019-10-03
Attending: FAMILY MEDICINE
Payer: COMMERCIAL

## 2019-10-03 VITALS
WEIGHT: 112 LBS | SYSTOLIC BLOOD PRESSURE: 130 MMHG | BODY MASS INDEX: 21.99 KG/M2 | RESPIRATION RATE: 19 BRPM | DIASTOLIC BLOOD PRESSURE: 68 MMHG | HEART RATE: 60 BPM | HEIGHT: 60 IN | OXYGEN SATURATION: 99 %

## 2019-10-03 DIAGNOSIS — H61.23 BILATERAL IMPACTED CERUMEN: ICD-10-CM

## 2019-10-03 DIAGNOSIS — I25.10 ASCVD (ARTERIOSCLEROTIC CARDIOVASCULAR DISEASE): ICD-10-CM

## 2019-10-03 DIAGNOSIS — K26.9 DUODENAL ULCER: ICD-10-CM

## 2019-10-03 DIAGNOSIS — K44.9 HIATAL HERNIA: ICD-10-CM

## 2019-10-03 DIAGNOSIS — K92.2 ACUTE GI BLEEDING: Primary | ICD-10-CM

## 2019-10-03 LAB
ANION GAP SERPL CALCULATED.3IONS-SCNC: 7 MMOL/L (ref 3–14)
BASOPHILS # BLD AUTO: 0 10E9/L (ref 0–0.2)
BASOPHILS NFR BLD AUTO: 0.3 %
BUN SERPL-MCNC: 26 MG/DL (ref 7–30)
CALCIUM SERPL-MCNC: 8.7 MG/DL (ref 8.5–10.1)
CHLORIDE SERPL-SCNC: 97 MMOL/L (ref 94–109)
CO2 SERPL-SCNC: 26 MMOL/L (ref 20–32)
CREAT SERPL-MCNC: 1.08 MG/DL (ref 0.52–1.04)
DIFFERENTIAL METHOD BLD: ABNORMAL
EOSINOPHIL # BLD AUTO: 0.1 10E9/L (ref 0–0.7)
EOSINOPHIL NFR BLD AUTO: 0.8 %
ERYTHROCYTE [DISTWIDTH] IN BLOOD BY AUTOMATED COUNT: 13.9 % (ref 10–15)
GFR SERPL CREATININE-BSD FRML MDRD: 44 ML/MIN/{1.73_M2}
GLUCOSE SERPL-MCNC: 98 MG/DL (ref 70–99)
HCT VFR BLD AUTO: 25 % (ref 35–47)
HGB BLD-MCNC: 8.5 G/DL (ref 11.7–15.7)
IMM GRANULOCYTES # BLD: 0 10E9/L (ref 0–0.4)
IMM GRANULOCYTES NFR BLD: 0.5 %
LYMPHOCYTES # BLD AUTO: 1.4 10E9/L (ref 0.8–5.3)
LYMPHOCYTES NFR BLD AUTO: 21.5 %
MCH RBC QN AUTO: 29.8 PG (ref 26.5–33)
MCHC RBC AUTO-ENTMCNC: 34 G/DL (ref 31.5–36.5)
MCV RBC AUTO: 88 FL (ref 78–100)
MONOCYTES # BLD AUTO: 0.6 10E9/L (ref 0–1.3)
MONOCYTES NFR BLD AUTO: 9.9 %
NEUTROPHILS # BLD AUTO: 4.3 10E9/L (ref 1.6–8.3)
NEUTROPHILS NFR BLD AUTO: 67 %
NRBC # BLD AUTO: 0 10*3/UL
NRBC BLD AUTO-RTO: 0 /100
PLATELET # BLD AUTO: 395 10E9/L (ref 150–450)
POTASSIUM SERPL-SCNC: 4.4 MMOL/L (ref 3.4–5.3)
RBC # BLD AUTO: 2.85 10E12/L (ref 3.8–5.2)
SODIUM SERPL-SCNC: 130 MMOL/L (ref 133–144)
WBC # BLD AUTO: 6.5 10E9/L (ref 4–11)

## 2019-10-03 PROCEDURE — G0463 HOSPITAL OUTPT CLINIC VISIT: HCPCS | Mod: 25

## 2019-10-03 PROCEDURE — 85025 COMPLETE CBC W/AUTO DIFF WBC: CPT | Mod: ZL | Performed by: FAMILY MEDICINE

## 2019-10-03 PROCEDURE — 36415 COLL VENOUS BLD VENIPUNCTURE: CPT | Mod: ZL | Performed by: FAMILY MEDICINE

## 2019-10-03 PROCEDURE — 99213 OFFICE O/P EST LOW 20 MIN: CPT | Performed by: FAMILY MEDICINE

## 2019-10-03 PROCEDURE — G0463 HOSPITAL OUTPT CLINIC VISIT: HCPCS

## 2019-10-03 PROCEDURE — 69209 REMOVE IMPACTED EAR WAX UNI: CPT | Mod: 50

## 2019-10-03 PROCEDURE — 80048 BASIC METABOLIC PNL TOTAL CA: CPT | Mod: ZL | Performed by: FAMILY MEDICINE

## 2019-10-03 ASSESSMENT — MIFFLIN-ST. JEOR: SCORE: 834.53

## 2019-10-03 ASSESSMENT — PAIN SCALES - GENERAL: PAINLEVEL: NO PAIN (0)

## 2019-10-03 NOTE — LETTER
October 3, 2019      Lauren Barron  [address]  [place] [address]        Dear ,    We are writing to inform you of your test results.    Hemoglobin is up to 8.5. continue with high iron diet, broccoli, liver, red meat  Sodium is just borderline low as it has been in the past, and is ok, will follow this  Other chemistries are stable.   We will recheck at her next visit    Resulted Orders   CBC with platelets and differential   Result Value Ref [facility]    WBC 6.5 4.0 - 11.0 10e9/L    RBC Count 2.85 (L) 3.8 - 5.2 10e12/L    Hemoglobin 8.5 (L) 11.7 - 15.7 g/dL    Hematocrit 25.0 (L) 35.0 - 47.0 %    MCV 88 78 - 100 fl    MCH 29.8 26.5 - 33.0 pg    MCHC 34.0 31.5 - 36.5 g/dL    RDW 13.9 10.0 - 15.0 %    Platelet Count 395 150 - 450 10e9/L    Diff Method Automated Method     % Neutrophils 67.0 %    % Lymphocytes 21.5 %    % Monocytes 9.9 %    % Eosinophils 0.8 %    % Basophils 0.3 %    % Immature Granulocytes 0.5 %    Nucleated RBCs 0 0 /100    Absolute Neutrophil 4.3 1.6 - 8.3 10e9/L    Absolute Lymphocytes 1.4 0.8 - 5.3 10e9/L    Absolute Monocytes 0.6 0.0 - 1.3 10e9/L    Absolute Eosinophils 0.1 0.0 - 0.7 10e9/L    Absolute Basophils 0.0 0.0 - 0.2 10e9/L    Abs Immature Granulocytes 0.0 0 - 0.4 10e9/L    Absolute Nucleated RBC 0.0    Basic metabolic panel   Result Value Ref [facility]    Sodium 130 (L) 133 - 144 mmol/L    Potassium 4.4 3.4 - 5.3 mmol/L    Chloride 97 94 - 109 mmol/L    Carbon Dioxide 26 20 - 32 mmol/L    Anion Gap 7 3 - 14 mmol/L    Glucose 98 70 - 99 mg/dL    Urea Nitrogen 26 7 - 30 mg/dL    Creatinine 1.08 (H) 0.52 - 1.04 mg/dL    GFR Estimate 44 (L) >60 mL/min/[1.73_m2]      Comment:      Non  GFR Calc  Starting 12/18/2018, serum creatinine based estimated GFR (eGFR) will be   calculated using the Chronic Kidney Disease Epidemiology Collaboration   (CKD-EPI) equation.      GFR Estimate If Black 51 (L) >60 mL/min/[1.73_m2]      Comment:       GFR  Calc  Starting 12/18/2018, serum creatinine based estimated GFR (eGFR) will be   calculated using the Chronic Kidney Disease Epidemiology Collaboration   (CKD-EPI) equation.      Calcium 8.7 8.5 - 10.1 mg/dL       If you have any questions or concerns, please call the clinic at the number listed above.       Sincerely,        Vandana Jones MD

## 2019-10-03 NOTE — PROGRESS NOTES
Subjective     Lauren Barron is a 89 year old female who presents to clinic today for the following health issues:    HPI       Hospital Follow-up Visit:    Hospital/Nursing Home/IP Rehab Facility: [facility]  Date of Admission: 09/25/19  Date of Discharge: 09/27/19  Reason(s) for Admission: Acute GI bleed            Problems taking medications regularly:  None       Medication changes since discharge: None       Problems adhering to non-medication therapy:  None    Summary of hospitalization:  [facility] discharge summary reviewed  Diagnostic Tests/Treatments reviewed.  Follow up needed: hemoglobin  Other Healthcare Providers Involved in Patient s Care:         None  Update since discharge: improved.     Post Discharge Medication Reconciliation: discharge medications reconciled, continue medications without change.  Plan of care communicated with patient and daughter Raeann     Coding guidelines for this visit:  Type of Medical   Decision Making Face-to-Face Visit       within 7 Days of discharge Face-to-Face Visit        within 14 days of discharge   Moderate Complexity [id number]   High Complexity [id number]           Discharge Diagnoses     Active Problems:    Acute GI bleeding (9/25/2019)    Acute blood loss anemia    Duodenal ulcer    CAD s/p BMS, no outpatient antiplatelet agent    HTN    HLD, no statin therapy           History of Present Illness     Lauren Barron is an 89 year old female who presented with GI bleeding.  Please see admission H+P for additional details.        Hospital Course     Lauren Barron was admitted on 9/25/2019.  She is a 89 year old female with a history of upper GI bleed in the past status post EGD in 2012 with H. pylori positive esophagitis and gastric ulcer, history of coronary artery disease status post stenting, sick sinus syndrome status post pacemaker placement, hypertension, hyperlipidemia who presents with bloody stools x2.  She had  resultant acute blood loss anemia with hemoglobin down as far as 7.2.  CT scan of the abdomen pelvis on admission showed possible rectal mass.  She did have EGD performed on 9/26/2019 which showed presence of a duodenal ulcer with clean base.  No active bleeding was seen.  She also had a flex sig performed on 9/26/2019 which did not reveal any evidence of a rectal mass.  She was treated with IV proton pump inhibitor and serial hemoglobin monitoring.  Her hemoglobin did appear to stabilize in the low 7 range.  She had no further bleeding, and she is currently asymptomatic and ambulating without difficulty.  She is on the cusp of needing a transfusion, however with her being asymptomatic at this time, we will hold off on this.  We will have her follow-up with her primary care physician as soon as possible with a hemoglobin check.  She does not take any outpatient antiplatelet or blood thinning medications despite her history of bare-metal stenting.     Nupur Epperson MD      She notes that she has occasional shortness of breath ongoing. This has been evaluated in the past and has been found to be due to a hiatal hernia which comes and goes. She is comfortable living with this and would not tolerate surgery.     She is going to be moving to the [place] this fall from her home.       Patient Active Problem List   Diagnosis     Epistaxis, recurrent     Advance Care Planning     Hyperlipidemia     Essential hypertension     Transient global amnesia     GI bleed     ASCVD (arteriosclerotic cardiovascular disease)     Squamous cell skin cancer, ala nasi     Allergic rhinitis     Meniere's disease     Diastolic dysfunction     Atherosclerosis of aorta (H)     Cystocele     Mammogram declined     Bradycardia     Aortic insufficiency     Mitral regurgitation     Osteoarthritis     Sick sinus syndrome (H)     Acute posthemorrhagic anemia     Coronary arteriosclerosis in native artery     Epistaxis     Gastrointestinal  hemorrhage     Multiple-type hyperlipidemia     Presence of cardiac pacemaker     Thrombocytopenia (H)     Hiatal hernia     Splenic artery aneurysm (H)     Pulmonary fibrosis (H)     Benign essential hypertension     Pulmonary nodules     CKD (chronic kidney disease) stage 3, GFR 30-59 ml/min (H)     Other emphysema (H)     Acute GI bleeding     Duodenal ulcer     Past Surgical History:   Procedure Laterality Date     APPENDECTOMY       ARTHROSCOPY KNEE      RIGHT - Osteoarthritis     C TOTAL KNEE ARTHROPLASTY      LEFT - Osteoarthritis - Ramírez Ruvalcaba     C TOTAL KNEE ARTHROPLASTY      RIGHT - Osteoarthritis - Ramírez Ruvalcaba     CATARACT EXTRACTION and LENS IMPLANTATION      BILATERAL     COLONOSCOPY       Colonoscopy with Polypectomy      GI BLEED - DR. DEL CASTILLO     ESOPHAGOSCOPY, GASTROSCOPY, DUODENOSCOPY (EGD), COMBINED N/A 2019    Procedure: UPPER ENDOSCOPY WITH BIOPSY;  Surgeon: Jorge Freeman MD;  Location: [facility]             REMOVAL OF OVARIAN CYST(S)       HYSTERECTOMY, YANG      Metorrhagia     IMPLANT PACEMAKER  2016     Left Subclavian Line, Triple Lumen      Gastric Ulcer, Dieulfoy lesion, hemostatic clips placed - Massive GI Bleed - Transferred to [facility]     RELEASE CARPAL TUNNEL      RIGHT - Carpal Tunnel Syndrome     REPAIR BLADDER       SIGMOIDOSCOPY FLEXIBLE N/A 2019    Procedure: FLEXIBLE SIGMOIDOSCOPY;  Surgeon: Jorge Freeman MD;  Location: [facility] OR       Social History     Tobacco Use     Smoking status: Never Smoker     Smokeless tobacco: Never Used   Substance Use Topics     Alcohol use: No     Alcohol/week: 0.0 standard drinks     Family History   Problem Relation Age of Onset     Cancer Brother         Pancreatic     Cancer Brother         Prostate     Cancer Sister         Breast     Hypertension Brother      Hypertension Father      Hypertension Sister      Hypertension Mother      Aneurysm Daughter 68        cerebral, sudden  death     Osteoporosis Other      Thyroid Disease No family hx of      Diabetes No family hx of          Current Outpatient Medications   Medication Sig Dispense Refill     acetaminophen (TYLENOL) 500 MG tablet Take 2 tablet (1000mg) by oral route every 6 hours as needed 100 tablet 2     Ascorbic Acid (VITAMIN C) 500 MG CAPS Take 500 mg by mouth daily       calcium carbonate (OS-PEARL) 1500 (600 Ca) MG tablet Take 600 mg by mouth daily       isosorbide mononitrate (IMDUR) 30 MG 24 hr tablet TAKE ONE TABLET BY MOUTH AT BEDTIME. 90 tablet 3     LORazepam (ATIVAN) 0.5 MG tablet TAKE 1 TABLET BY MOUTH EVERY 8 HOURS AS NEEDED FOR ANXIETY 30 tablet 0     LORazepam (ATIVAN) 0.5 MG tablet TAKE 1 TABLET BY MOUTH EVERY 8 HOURS AS NEEDED FOR ANXIETY 30 tablet 0     Multiple Vitamins-Minerals (MULTIVITAMIN ADULT PO) Take 200 mg by mouth daily       nitroGLYcerin (NITROSTAT) 0.4 MG sublingual tablet Place 1 tablet (0.4mg) by sublingual route at the first sign of attack; may repeat ever 5 min until relief; if pain persists after 3 tablets in 15 minutes prompt medical attention is recommended. AS NEEDED 25 tablet 3     pantoprazole (PROTONIX) 40 MG EC tablet Take 1 tablet (40 mg) by mouth 2 times daily 60 tablet 0     potassium 99 MG TABS Take 99 mg by mouth daily       STATIN NOT PRESCRIBED, INTENTIONAL, Statin not prescribed intentionally due to Other patient declines (This option does not exclude patient from measure) 0 each 0     triamterene-HCTZ (DYAZIDE) 37.5-25 MG capsule Take 1 capsule by mouth daily 90 capsule 3     Allergies   Allergen Reactions     [place] Juice Other (See Comments), Nausea and GI Disturbance     Vertigo     Food      Oranges.     Naprosyn [Naproxen]      Incontinence       Niacin Swelling     BP Readings from Last 3 Encounters:   10/03/19 130/68   09/27/19 158/61   07/22/19 138/60    Wt Readings from Last 3 Encounters:   10/03/19 50.8 kg (112 lb)   09/25/19 51.8 kg (114 lb 3.2 oz)   07/22/19 50.8 kg (112  lb)                      Reviewed and updated as needed this visit by Provider  Meds  Problems  Med Hx         Review of Systems   ROS COMP: Constitutional, HEENT, cardiovascular, pulmonary, gi and gu systems are negative, except as otherwise noted.      Objective    /68   Pulse 60   Resp 19   Ht 1.524 m (5')   Wt 50.8 kg (112 lb)   SpO2 99%   BMI 21.87 kg/m    Body mass index is 21.87 kg/m .  Physical Exam   GENERAL APPEARANCE: healthy, alert and no distress  EYES: Eyes grossly normal to inspection  HENT: ear canals obstructed with cerumen and nose and mouth without ulcers or lesions  NECK: no adenopathy, no asymmetry, masses, or scars and thyroid normal to palpation  RESP: lungs clear to auscultation - no rales, rhonchi or wheezes  CV: regular rates and rhythm, normal S1 S2, no S3 or S4 and grade 1/6 DIVYA murmur heard best over the LSB  LYMPHATICS: no cervical adenopathy  ABDOMEN: soft, nontender, without hepatosplenomegaly or masses and bowel sounds normal  MS: extremities normal- no gross deformities noted and no edema  SKIN: no suspicious lesions or rashes  NEURO: Normal strength and tone, mentation intact and speech normal  PSYCH: mentation appears normal and affect normal/bright            Assessment & Plan     1. Acute GI bleeding  Due to duodenal ulcer. Family has removed all NSAIDS from her home. She has had GI bleeding in the past as well. We have stopped her aspirin due to this. Will recheck lab today.     - CBC with platelets and differential  - Basic metabolic panel    2. Duodenal ulcer  As above.     3. ASCVD (arteriosclerotic cardiovascular disease)  Bare metal stent place 10 years ago. GI bleed with anticoagulants so this was stopped. She has declined to continue with statins as well     4. Bilateral impacted cerumen  Ears cleaned bilaterally with good results.     5. Hiatal hernia  Causing intermittent shortness of breath, follow             Return in about 3 months (around  1/3/2020).    Vandana Jones MD  [facility] [place]

## 2019-10-03 NOTE — NURSING NOTE
Chief Complaint   Patient presents with     Hospital F/U       Initial /68   Pulse 60   Resp 19   Ht 1.524 m (5')   Wt 50.8 kg (112 lb)   SpO2 99%   BMI 21.87 kg/m   Estimated body mass index is 21.87 kg/m  as calculated from the following:    Height as of this encounter: 1.524 m (5').    Weight as of this encounter: 50.8 kg (112 lb).  Medication Reconciliation: complete  Lucero Mckeon LPN

## 2019-10-03 NOTE — NURSING NOTE
Patient identified using two patient identifiers.  Ear exam showing wax occlusion completed by LPN  .  Solution: warm water was placed in the bilateral ear(s) via irrigation tool: elephant ear.  Lucero Mckeon LPN

## 2019-10-10 ENCOUNTER — TELEPHONE (OUTPATIENT)
Dept: FAMILY MEDICINE | Facility: OTHER | Age: 84
End: 2019-10-10

## 2019-10-10 DIAGNOSIS — R30.0 DYSURIA: ICD-10-CM

## 2019-10-10 DIAGNOSIS — K92.2 ACUTE GI BLEEDING: ICD-10-CM

## 2019-10-10 DIAGNOSIS — R82.90 ABNORMAL URINE FINDINGS: Primary | ICD-10-CM

## 2019-10-10 DIAGNOSIS — I10 ESSENTIAL HYPERTENSION: ICD-10-CM

## 2019-10-10 DIAGNOSIS — R30.0 DYSURIA: Primary | ICD-10-CM

## 2019-10-10 LAB
ALBUMIN UR-MCNC: NEGATIVE MG/DL
AMORPH CRY #/AREA URNS HPF: ABNORMAL /HPF
APPEARANCE UR: ABNORMAL
BACTERIA #/AREA URNS HPF: ABNORMAL /HPF
BILIRUB UR QL STRIP: NEGATIVE
COLOR UR AUTO: ABNORMAL
GLUCOSE UR STRIP-MCNC: NEGATIVE MG/DL
HGB BLD-MCNC: 8.7 G/DL (ref 11.7–15.7)
HGB UR QL STRIP: NEGATIVE
KETONES UR STRIP-MCNC: NEGATIVE MG/DL
LEUKOCYTE ESTERASE UR QL STRIP: NEGATIVE
MUCOUS THREADS #/AREA URNS LPF: PRESENT /LPF
NITRATE UR QL: POSITIVE
PH UR STRIP: 7 PH (ref 4.7–8)
RBC #/AREA URNS AUTO: 1 /HPF (ref 0–2)
SOURCE: ABNORMAL
SP GR UR STRIP: 1.01 (ref 1–1.03)
UROBILINOGEN UR STRIP-MCNC: NORMAL MG/DL (ref 0–2)
WBC #/AREA URNS AUTO: 2 /HPF (ref 0–5)

## 2019-10-10 PROCEDURE — 85018 HEMOGLOBIN: CPT | Mod: ZL | Performed by: INTERNAL MEDICINE

## 2019-10-10 PROCEDURE — 87186 SC STD MICRODIL/AGAR DIL: CPT | Mod: ZL | Performed by: FAMILY MEDICINE

## 2019-10-10 PROCEDURE — 87086 URINE CULTURE/COLONY COUNT: CPT | Mod: ZL | Performed by: FAMILY MEDICINE

## 2019-10-10 PROCEDURE — 87088 URINE BACTERIA CULTURE: CPT | Mod: ZL | Performed by: FAMILY MEDICINE

## 2019-10-10 PROCEDURE — 36415 COLL VENOUS BLD VENIPUNCTURE: CPT | Mod: ZL | Performed by: INTERNAL MEDICINE

## 2019-10-10 PROCEDURE — 81001 URINALYSIS AUTO W/SCOPE: CPT | Mod: ZL | Performed by: FAMILY MEDICINE

## 2019-10-10 NOTE — TELEPHONE ENCOUNTER
"Patient daughter called this morning and states that her mother is worried about having a bladder infections. Got up in the night and stated that ir burned when it urination. States she was \"up a few times last night having to go\" which is unsual for patient. Patient does not wish to have an appointment as she was just seen last weekend but is wondering if she would be able to just have a lab only urine sample. Please advise.     Please call Yesica [phone]   Ok to leave message if she doesn't answer.   "

## 2019-10-11 DIAGNOSIS — N30.00 ACUTE CYSTITIS WITHOUT HEMATURIA: Primary | ICD-10-CM

## 2019-10-11 RX ORDER — SULFAMETHOXAZOLE/TRIMETHOPRIM 800-160 MG
1 TABLET ORAL 2 TIMES DAILY
Qty: 10 TABLET | Refills: 0 | Status: SHIPPED | OUTPATIENT
Start: 2019-10-11 | End: 2019-10-14

## 2019-10-12 LAB
BACTERIA SPEC CULT: ABNORMAL
SPECIMEN SOURCE: ABNORMAL

## 2019-10-14 ENCOUNTER — HOSPITAL ENCOUNTER (INPATIENT)
Facility: HOSPITAL | Age: 84
LOS: 3 days | Discharge: HOME | DRG: 690 | End: 2019-10-17
Attending: FAMILY MEDICINE | Admitting: INTERNAL MEDICINE
Payer: COMMERCIAL

## 2019-10-14 ENCOUNTER — TELEPHONE (OUTPATIENT)
Dept: FAMILY MEDICINE | Facility: OTHER | Age: 84
End: 2019-10-14

## 2019-10-14 ENCOUNTER — OFFICE VISIT (OUTPATIENT)
Dept: FAMILY MEDICINE | Facility: OTHER | Age: 84
DRG: 690 | End: 2019-10-14
Attending: FAMILY MEDICINE
Payer: COMMERCIAL

## 2019-10-14 VITALS
BODY MASS INDEX: 21.99 KG/M2 | TEMPERATURE: 97.1 F | HEIGHT: 60 IN | RESPIRATION RATE: 20 BRPM | SYSTOLIC BLOOD PRESSURE: 138 MMHG | DIASTOLIC BLOOD PRESSURE: 68 MMHG | OXYGEN SATURATION: 98 % | WEIGHT: 112 LBS | HEART RATE: 60 BPM

## 2019-10-14 DIAGNOSIS — E87.1 HYPONATREMIA: ICD-10-CM

## 2019-10-14 DIAGNOSIS — N18.30 CKD (CHRONIC KIDNEY DISEASE) STAGE 3, GFR 30-59 ML/MIN (H): ICD-10-CM

## 2019-10-14 DIAGNOSIS — R10.13 ABDOMINAL PAIN, EPIGASTRIC: ICD-10-CM

## 2019-10-14 DIAGNOSIS — N30.00 ACUTE CYSTITIS WITHOUT HEMATURIA: Primary | ICD-10-CM

## 2019-10-14 DIAGNOSIS — K26.9 DUODENAL ULCER: ICD-10-CM

## 2019-10-14 DIAGNOSIS — R82.90 ABNORMAL URINE FINDINGS: ICD-10-CM

## 2019-10-14 LAB
ALBUMIN SERPL-MCNC: 3.7 G/DL (ref 3.4–5)
ALBUMIN UR-MCNC: 10 MG/DL
ALP SERPL-CCNC: 55 U/L (ref 40–150)
ALT SERPL W P-5'-P-CCNC: 35 U/L (ref 0–50)
ANION GAP SERPL CALCULATED.3IONS-SCNC: 8 MMOL/L (ref 3–14)
ANION GAP SERPL CALCULATED.3IONS-SCNC: 9 MMOL/L (ref 3–14)
APPEARANCE UR: CLEAR
AST SERPL W P-5'-P-CCNC: 30 U/L (ref 0–45)
BACTERIA #/AREA URNS HPF: ABNORMAL /HPF
BASOPHILS # BLD AUTO: 0 10E9/L (ref 0–0.2)
BASOPHILS NFR BLD AUTO: 0.2 %
BILIRUB SERPL-MCNC: 0.4 MG/DL (ref 0.2–1.3)
BILIRUB UR QL STRIP: NEGATIVE
BUN SERPL-MCNC: 23 MG/DL (ref 7–30)
BUN SERPL-MCNC: 23 MG/DL (ref 7–30)
CALCIUM SERPL-MCNC: 8.3 MG/DL (ref 8.5–10.1)
CALCIUM SERPL-MCNC: 8.5 MG/DL (ref 8.5–10.1)
CHLORIDE SERPL-SCNC: 82 MMOL/L (ref 94–109)
CHLORIDE SERPL-SCNC: 83 MMOL/L (ref 94–109)
CO2 SERPL-SCNC: 24 MMOL/L (ref 20–32)
CO2 SERPL-SCNC: 25 MMOL/L (ref 20–32)
COLOR UR AUTO: ABNORMAL
CREAT SERPL-MCNC: 0.97 MG/DL (ref 0.52–1.04)
CREAT SERPL-MCNC: 1 MG/DL (ref 0.52–1.04)
DIFFERENTIAL METHOD BLD: ABNORMAL
EOSINOPHIL # BLD AUTO: 0 10E9/L (ref 0–0.7)
EOSINOPHIL NFR BLD AUTO: 0.2 %
ERYTHROCYTE [DISTWIDTH] IN BLOOD BY AUTOMATED COUNT: 12.3 % (ref 10–15)
GFR SERPL CREATININE-BSD FRML MDRD: 48 ML/MIN/{1.73_M2}
GFR SERPL CREATININE-BSD FRML MDRD: 50 ML/MIN/{1.73_M2}
GLUCOSE SERPL-MCNC: 105 MG/DL (ref 70–99)
GLUCOSE SERPL-MCNC: 127 MG/DL (ref 70–99)
GLUCOSE UR STRIP-MCNC: NEGATIVE MG/DL
HCT VFR BLD AUTO: 25.7 % (ref 35–47)
HGB BLD-MCNC: 9.1 G/DL (ref 11.7–15.7)
HGB UR QL STRIP: ABNORMAL
IMM GRANULOCYTES # BLD: 0 10E9/L (ref 0–0.4)
IMM GRANULOCYTES NFR BLD: 0.2 %
KETONES UR STRIP-MCNC: NEGATIVE MG/DL
LEUKOCYTE ESTERASE UR QL STRIP: NEGATIVE
LYMPHOCYTES # BLD AUTO: 0.8 10E9/L (ref 0.8–5.3)
LYMPHOCYTES NFR BLD AUTO: 19.3 %
MAGNESIUM SERPL-MCNC: 1.8 MG/DL (ref 1.6–2.3)
MCH RBC QN AUTO: 28.8 PG (ref 26.5–33)
MCHC RBC AUTO-ENTMCNC: 35.4 G/DL (ref 31.5–36.5)
MCV RBC AUTO: 81 FL (ref 78–100)
MONOCYTES # BLD AUTO: 0.6 10E9/L (ref 0–1.3)
MONOCYTES NFR BLD AUTO: 14.6 %
NEUTROPHILS # BLD AUTO: 2.6 10E9/L (ref 1.6–8.3)
NEUTROPHILS NFR BLD AUTO: 65.5 %
NITRATE UR QL: NEGATIVE
NRBC # BLD AUTO: 0 10*3/UL
NRBC BLD AUTO-RTO: 0 /100
PH UR STRIP: 7 PH (ref 4.7–8)
PHOSPHATE SERPL-MCNC: 2.4 MG/DL (ref 2.5–4.5)
PLATELET # BLD AUTO: 298 10E9/L (ref 150–450)
POTASSIUM SERPL-SCNC: 4.1 MMOL/L (ref 3.4–5.3)
POTASSIUM SERPL-SCNC: 4.2 MMOL/L (ref 3.4–5.3)
PROT SERPL-MCNC: 7.1 G/DL (ref 6.8–8.8)
RBC # BLD AUTO: 3.16 10E12/L (ref 3.8–5.2)
RBC #/AREA URNS AUTO: 13 /HPF (ref 0–2)
SODIUM SERPL-SCNC: 115 MMOL/L (ref 133–144)
SODIUM SERPL-SCNC: 116 MMOL/L (ref 133–144)
SODIUM SERPL-SCNC: 119 MMOL/L (ref 133–144)
SOURCE: ABNORMAL
SP GR UR STRIP: 1.01 (ref 1–1.03)
UROBILINOGEN UR STRIP-MCNC: NORMAL MG/DL (ref 0–2)
WBC # BLD AUTO: 4 10E9/L (ref 4–11)
WBC #/AREA URNS AUTO: 1 /HPF (ref 0–5)

## 2019-10-14 PROCEDURE — 25000132 ZZH RX MED GY IP 250 OP 250 PS 637: Performed by: NURSE PRACTITIONER

## 2019-10-14 PROCEDURE — 36415 COLL VENOUS BLD VENIPUNCTURE: CPT | Performed by: NURSE PRACTITIONER

## 2019-10-14 PROCEDURE — 12000000 ZZH R&B MED SURG/OB

## 2019-10-14 PROCEDURE — 83735 ASSAY OF MAGNESIUM: CPT | Performed by: NURSE PRACTITIONER

## 2019-10-14 PROCEDURE — 84100 ASSAY OF PHOSPHORUS: CPT | Performed by: NURSE PRACTITIONER

## 2019-10-14 PROCEDURE — 81001 URINALYSIS AUTO W/SCOPE: CPT | Mod: ZL | Performed by: FAMILY MEDICINE

## 2019-10-14 PROCEDURE — 80048 BASIC METABOLIC PNL TOTAL CA: CPT | Performed by: NURSE PRACTITIONER

## 2019-10-14 PROCEDURE — 99223 1ST HOSP IP/OBS HIGH 75: CPT | Performed by: NURSE PRACTITIONER

## 2019-10-14 PROCEDURE — 84295 ASSAY OF SERUM SODIUM: CPT | Performed by: NURSE PRACTITIONER

## 2019-10-14 PROCEDURE — 25800030 ZZH RX IP 258 OP 636: Performed by: NURSE PRACTITIONER

## 2019-10-14 PROCEDURE — G0463 HOSPITAL OUTPT CLINIC VISIT: HCPCS

## 2019-10-14 PROCEDURE — 87086 URINE CULTURE/COLONY COUNT: CPT | Mod: ZL | Performed by: FAMILY MEDICINE

## 2019-10-14 PROCEDURE — 40000786 ZZHCL STATISTIC ACTIVE MRSA SURVEILLANCE CULTURE: Performed by: NURSE PRACTITIONER

## 2019-10-14 PROCEDURE — 36415 COLL VENOUS BLD VENIPUNCTURE: CPT | Mod: ZL | Performed by: FAMILY MEDICINE

## 2019-10-14 PROCEDURE — 80053 COMPREHEN METABOLIC PANEL: CPT | Mod: ZL | Performed by: FAMILY MEDICINE

## 2019-10-14 PROCEDURE — 85025 COMPLETE CBC W/AUTO DIFF WBC: CPT | Mod: ZL | Performed by: FAMILY MEDICINE

## 2019-10-14 PROCEDURE — 99207 ZZC OFFICE-HOSPITAL ADMIT: CPT | Performed by: FAMILY MEDICINE

## 2019-10-14 RX ORDER — PANTOPRAZOLE SODIUM 40 MG/1
40 TABLET, DELAYED RELEASE ORAL 2 TIMES DAILY
Status: DISCONTINUED | OUTPATIENT
Start: 2019-10-14 | End: 2019-10-17 | Stop reason: HOSPADM

## 2019-10-14 RX ORDER — LIDOCAINE 40 MG/G
CREAM TOPICAL
Status: DISCONTINUED | OUTPATIENT
Start: 2019-10-14 | End: 2019-10-17 | Stop reason: HOSPADM

## 2019-10-14 RX ORDER — LORAZEPAM 0.5 MG/1
0.5 TABLET ORAL EVERY 8 HOURS PRN
Status: DISCONTINUED | OUTPATIENT
Start: 2019-10-14 | End: 2019-10-17 | Stop reason: HOSPADM

## 2019-10-14 RX ORDER — SODIUM CHLORIDE 9 MG/ML
INJECTION, SOLUTION INTRAVENOUS CONTINUOUS
Status: DISCONTINUED | OUTPATIENT
Start: 2019-10-14 | End: 2019-10-15

## 2019-10-14 RX ORDER — ACETAMINOPHEN 325 MG/1
650 TABLET ORAL EVERY 4 HOURS PRN
Status: DISCONTINUED | OUTPATIENT
Start: 2019-10-14 | End: 2019-10-17 | Stop reason: HOSPADM

## 2019-10-14 RX ORDER — NALOXONE HYDROCHLORIDE 0.4 MG/ML
.1-.4 INJECTION, SOLUTION INTRAMUSCULAR; INTRAVENOUS; SUBCUTANEOUS
Status: DISCONTINUED | OUTPATIENT
Start: 2019-10-14 | End: 2019-10-17 | Stop reason: HOSPADM

## 2019-10-14 RX ORDER — ISOSORBIDE MONONITRATE 30 MG/1
30 TABLET, EXTENDED RELEASE ORAL AT BEDTIME
Status: DISCONTINUED | OUTPATIENT
Start: 2019-10-14 | End: 2019-10-17 | Stop reason: HOSPADM

## 2019-10-14 RX ORDER — CEFDINIR 300 MG/1
300 CAPSULE ORAL
Status: DISCONTINUED | OUTPATIENT
Start: 2019-10-14 | End: 2019-10-17 | Stop reason: HOSPADM

## 2019-10-14 RX ORDER — ONDANSETRON 4 MG/1
4 TABLET, ORALLY DISINTEGRATING ORAL EVERY 6 HOURS PRN
Status: DISCONTINUED | OUTPATIENT
Start: 2019-10-14 | End: 2019-10-17 | Stop reason: HOSPADM

## 2019-10-14 RX ORDER — ONDANSETRON 2 MG/ML
4 INJECTION INTRAMUSCULAR; INTRAVENOUS EVERY 6 HOURS PRN
Status: DISCONTINUED | OUTPATIENT
Start: 2019-10-14 | End: 2019-10-17 | Stop reason: HOSPADM

## 2019-10-14 RX ORDER — MAGNESIUM SULFATE HEPTAHYDRATE 40 MG/ML
4 INJECTION, SOLUTION INTRAVENOUS EVERY 4 HOURS PRN
Status: DISCONTINUED | OUTPATIENT
Start: 2019-10-14 | End: 2019-10-15

## 2019-10-14 RX ADMIN — SODIUM CHLORIDE: 9 INJECTION, SOLUTION INTRAVENOUS at 17:01

## 2019-10-14 RX ADMIN — ISOSORBIDE MONONITRATE 30 MG: 30 TABLET, EXTENDED RELEASE ORAL at 22:26

## 2019-10-14 RX ADMIN — CEFDINIR 300 MG: 300 CAPSULE ORAL at 17:19

## 2019-10-14 RX ADMIN — PANTOPRAZOLE SODIUM 40 MG: 40 TABLET, DELAYED RELEASE ORAL at 21:39

## 2019-10-14 ASSESSMENT — MIFFLIN-ST. JEOR
SCORE: 834.53
SCORE: 872.25

## 2019-10-14 ASSESSMENT — PAIN SCALES - GENERAL: PAINLEVEL: NO PAIN (0)

## 2019-10-14 ASSESSMENT — ACTIVITIES OF DAILY LIVING (ADL): ADLS_ACUITY_SCORE: 20

## 2019-10-14 NOTE — PROGRESS NOTES
Subjective     Lauren Barron is a 89 year old female who presents to clinic today for the following health issues:    HPI   Gastrointestinal symptoms      Duration: started yesterday     Description:           ABDOMINAL PAIN - epigastric area  .   Pain is described as throbbing and radiates to none.    Intensity:  moderate    Accompanying signs and symptoms:  nausea and vomitting    History  Previous {similar problem: YES  Previous evaluation:  EGD    Aggravating factors: none    Alleviating factors: nothing    Other Therapies tried: None  This started after taking septra ds for a bladder infection. She describes intermittent pain, aching, in the mid epigastric area. She had an EGD on 9-26-19 which revealed a duodenal ulcer as she had been hospitalized for a GI bleed. No further bleeding. She has been eating but not much yesterday, and keeping fluids down per her daughter, Raeann who is with her.  She is a little confused since she had the bladder infection. She has been pushing fluids. She is accompanied by her daughters Raeann and later Yesica.          Patient Active Problem List   Diagnosis     Epistaxis, recurrent     Advance Care Planning     Hyperlipidemia     Essential hypertension     Transient global amnesia     GI bleed     ASCVD (arteriosclerotic cardiovascular disease)     Squamous cell skin cancer, ala nasi     Allergic rhinitis     Meniere's disease     Diastolic dysfunction     Atherosclerosis of aorta (H)     Cystocele     Mammogram declined     Bradycardia     Aortic insufficiency     Mitral regurgitation     Osteoarthritis     Sick sinus syndrome (H)     Acute posthemorrhagic anemia     Coronary arteriosclerosis in native artery     Epistaxis     Gastrointestinal hemorrhage     Multiple-type hyperlipidemia     Presence of cardiac pacemaker     Thrombocytopenia (H)     Hiatal hernia     Splenic artery aneurysm (H)     Pulmonary fibrosis (H)     Benign essential hypertension     Pulmonary nodules      CKD (chronic kidney disease) stage 3, GFR 30-59 ml/min (H)     Other emphysema (H)     Acute GI bleeding     Duodenal ulcer     Acute hyponatremia     Hyponatremia     Past Surgical History:   Procedure Laterality Date     APPENDECTOMY       ARTHROSCOPY KNEE      RIGHT - Osteoarthritis     C TOTAL KNEE ARTHROPLASTY      LEFT - Osteoarthritis - [facility] Ruvalc[facility]     C TOTAL KNEE ARTHROPLASTY      RIGHT - Osteoarthritis - Ramírez Ruvalcaba     CATARACT EXTRACTION and LENS IMPLANTATION      BILATERAL     COLONOSCOPY       Colonoscopy with Polypectomy      GI BLEED - DR. DEL CASTILLO     ESOPHAGOSCOPY, GASTROSCOPY, DUODENOSCOPY (EGD), COMBINED N/A 2019    Procedure: UPPER ENDOSCOPY WITH BIOPSY;  Surgeon: Jorge Freeman MD;  Location: [facility]             REMOVAL OF OVARIAN CYST(S)       HYSTERECTOMY, YANG      Metorrhagia     IMPLANT PACEMAKER  2016     Left Subclavian Line, Triple Lumen      Gastric Ulcer, Dieulfoy lesion, hemostatic clips placed - Massive GI Bleed - Transferred to [facility]     RELEASE CARPAL TUNNEL      RIGHT - Carpal Tunnel Syndrome     REPAIR BLADDER       SIGMOIDOSCOPY FLEXIBLE N/A 2019    Procedure: FLEXIBLE SIGMOIDOSCOPY;  Surgeon: Jorge Freeman MD;  Location: [facility]       Social History     Tobacco Use     Smoking status: Never Smoker     Smokeless tobacco: Never Used   Substance Use Topics     Alcohol use: No     Alcohol/week: 0.0 standard drinks     Family History   Problem Relation Age of Onset     Cancer Brother         Pancreatic     Cancer Brother         Prostate     Cancer Sister         Breast     Hypertension Brother      Hypertension Father      Hypertension Sister      Hypertension Mother      Aneurysm Daughter 68        cerebral, sudden death     Osteoporosis Other      Thyroid Disease No family hx of      Diabetes No family hx of          No current outpatient medications on file.     Allergies   Allergen Reactions     [place] Juice  Other (See Comments), Nausea and GI Disturbance     Vertigo     Food      Oranges.     Naprosyn [Naproxen]      Incontinence       Niacin Swelling     BP Readings from Last 3 Encounters:   10/14/19 159/66   10/14/19 138/68   10/03/19 130/68    Wt Readings from Last 3 Encounters:   10/14/19 51.4 kg (113 lb 5.1 oz)   10/14/19 50.8 kg (112 lb)   10/03/19 50.8 kg (112 lb)                      Reviewed and updated as needed this visit by Provider         Review of Systems   ROS COMP: Constitutional, HEENT, cardiovascular, pulmonary, GI, , musculoskeletal, neuro, skin, endocrine and psych systems are negative, except as otherwise noted.      Objective    /68   Pulse 60   Temp 97.1  F (36.2  C) (Tympanic)   Resp 20   Ht 1.524 m (5')   Wt 50.8 kg (112 lb)   SpO2 98%   BMI 21.87 kg/m    Body mass index is 21.87 kg/m .  Physical Exam   GENERAL: alert, no distress and fatigued  EYES: Eyes grossly normal to inspection, PERRL and conjunctivae and sclerae normal  HENT: ear canals and TM's normal, nose and mouth without ulcers or lesions  NECK: no adenopathy, no asymmetry, masses, or scars and thyroid normal to palpation  RESP: lungs clear to auscultation - no rales, rhonchi or wheezes  BREAST: normal without masses, tenderness or nipple discharge and no palpable axillary masses or adenopathy  CV: regular rates and rhythm, normal S1 S2, no S3 or S4, grade 1/6 DIVYA murmur heard best over the LSB, peripheral pulses strong and no peripheral edema  ABDOMEN: soft, nontender, without hepatosplenomegaly or masses, tenderness epigastric, mild, no rebound or guarding, and bowel sounds normal  MS: no gross musculoskeletal defects noted, no edema  SKIN: no suspicious lesions or rashes  NEURO: Normal strength and tone, mentation intact and speech normal  PSYCH: concentration poor, affect normal/bright and mildly confused            Assessment & Plan     1. Acute cystitis without hematuria  Recheck UA. Septra was upsetting to her  stomach. Will recheck her UA to see if more antibiotics are needed. She was able to keep down 2 1/2 days of Septra  - UA reflex to Microscopic and Culture    2. Abdominal pain, epigastric  With duodenal ulcer recently noted , check labs. No current bleeding  - CBC with platelets and differential  - Comprehensive metabolic panel    3. Duodenal ulcer  As abovel     4. Abnormal urine findings    - Urine Culture Aerobic Bacterial    5. Hyponatremia  115. dicussed with Dr Carrington, hospitalist, who accepts her for admission             No follow-ups on file.    Vandana Jones MD  [facility] [place]

## 2019-10-14 NOTE — PLAN OF CARE
[facility] Inpatient Admission Note:    Patient admitted to 3110/3110-1 at approximately 1530 via wheel chair accompanied by daughter from clinic .  Patient ambulated to bed via standby assist. Patient is alert and oriented X 3, denies pain; rates at 0 on 0-10 scale.  Patient oriented to room, unit, hourly rounding, and plan of care. Explained admission packet and patient handbook with patient bill of rights brochure. Will continue to monitor and document as needed.     Inpatient Nursing criteria listed below was met:    Health care directives status obtained and documented: Yes    Care Everywhere authorization obtained No    MRSA swab completed for patient 65 years and older: Yes    Patient identifies a surrogate decision maker: Yes If yes, who: see advance directive -  Contact Information: see advance directive     If initial lactic acid >2.0, repeat lactic acid drawn within one hour of arrival to unit: NA    Vaccination assessment and education completed: Yes   Vaccinations received prior to admission: Pneumovax yes  Influenza(seasonal)  YES   Vaccination(s) ordered: not given today because patient is up to date    Clergy visit ordered if patient requests: N/A - family will contact    Skin issues/needs documented: Yes    Isolation Patient: no Education given, correct sign in place and documentation row added to PCS:  No    Fall Prevention Yes: Care plan updated, education given and documented, sticker and magnet in place: Yes    Care Plan initiated: Yes    Education Documented (including assessment): Yes    Patient has discharge needs : No

## 2019-10-14 NOTE — PROVIDER NOTIFICATION
DATE:  10/14/2019   TIME OF RECEIPT FROM LAB:  1828  LAB TEST:  Sodium  LAB VALUE:  116  RESULTS GIVEN WITH READ-BACK TO (PROVIDER):  Dr. Bragg  TIME LAB VALUE REPORTED TO PROVIDER:   4833    Orders to continue NS infusion at 125mL/hr and recheck NA as scheduled at 10pm and in the AM.

## 2019-10-14 NOTE — TELEPHONE ENCOUNTER
Please schedule patient for date/time: can work in this afternoon per DR Vandana Jones at 1:45, arrive at 1:30    Have patient go to ER/Urgent Care Center. Urgent Care hours are 9:30 am to 8 pm, open 7 days a week. No.    Provider will call patient.No.    Other:

## 2019-10-14 NOTE — H&P
[facility]    History and Physical  Hospitalist       Date of Admission:  10/14/2019  Date of Service (when I saw the patient): 10/14/19    Assessment & Plan       Acute on chronic hyponatremia: Likely due to increased water intake with UTI plus diazide. Over the last years she hs had sodium level of 127 and 130. Will place 1500cc fluid restriction, gentle IVF NS overnight as she does appear mildly dry. Hold diazide-she already took this AM dose.  She is mildly symptomatic with fatigue, mild confusion. Recheck sodium in 2 hours, will adjust IVF if needed for goal to get her above 120meq then 24 hour goal of 4-6meq.    UTI: Took 2 doses of bactrim but she had abdominal pain after both then started having nausea. Pan-senstive e.coli on culture will treat with omnicef.     Abdominal pain with nausea: began after starting bactrim. She was discharged from hospital 9/27 with gastric ulcer-likely irritated with Bactrim. Switch to Omnicef, continue PPI.       Essential hypertension: Blood pressure mildly elevated on arrival, will monitor.       Diastolic dysfunction: No systolic heart failure on echo 2015, she does have moderate mitral regurgitation as well. Will get daily weights and strict I/O.       Pulmonary fibrosis (H): No signs of acute respiratory illness. No hypoxia.       CKD (chronic kidney disease) stage 3, GFR 30-59 ml/min (H): Creatinine baseline  9 hours-1.2, today 1.0. Will monitor for changes.       DVT Prophylaxis: Pneumatic Compression Devices  Code Status: Full Code-Discussed with patient and bedside    Disposition: Expected discharge in 2-3 days once sodium normalized.    Heidi Langford, CNP    Primary Care Physician   Vandana Jones    Chief Complaint   Nausea/abdominal pain    History is obtained from the patient and electronic health record    History of Present Illness   Lauren Barron is a 89 year old female who presents with abdominal pain and nausea. She had follow-up  appointment after hospital stay for acute GI bleed with ulcer found on 10/3 and felt well at that time. She then developed burning and frequency with urination on 10/9. Urine was collected and sent for culture. She was started On Bactrim 10/11 and has taken 2 doses. With both doses her daughter stated she developed upper epigastric pain and nausea. Then over the last 24 hours she was gagging frequently so she presented again to clinic. Labs showed significant hyponatremia so she was admitted directly to the hospital. On arrival here she is confused, does not recall the nausea or bladder infection. She is oriented to self/time/plcae but no recent events. She will be treated as above.     Past Medical History    I have reviewed this patient's medical history and updated it with pertinent information if needed.   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     Aortic insufficiency 6/2/2015    moderate, aortic sclerosis without stenosis echo 5/6/2015      ASCVD (arteriosclerotic cardiovascular disease) 08/25/2009    Bare metal stent obtuse margnial     Atherosclerosis of aorta (H) 03/12/2012    ECHO 2/2012     Cystocele 03/22/2012     Diastolic dysfunction 03/12/2012     GI bleed 07/25/2011    EGD-H Pylori, treated, Colonoscopy small Polyp - transfused 3 units of blood.     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  Past Surgical History:   Procedure Laterality Date     APPENDECTOMY       ARTHROSCOPY KNEE      RIGHT - Osteoarthritis     C TOTAL KNEE ARTHROPLASTY      LEFT - Osteoarthritis - [facility]     C TOTAL KNEE ARTHROPLASTY      RIGHT - Osteoarthritis - [facility]val[facility]     CATARACT EXTRACTION and LENS IMPLANTATION  2010    BILATERAL     COLONOSCOPY  2012     Colonoscopy with Polypectomy      GI BLEED - DR. DEL CASTILLO     ESOPHAGOSCOPY, GASTROSCOPY, DUODENOSCOPY (EGD), COMBINED N/A 2019    Procedure: UPPER ENDOSCOPY WITH BIOPSY;  Surgeon: Jorge Freeman MD;  Location: [facility] OR             REMOVAL OF OVARIAN CYST(S)       HYSTERECTOMY, YANG      Metorrhagia     IMPLANT PACEMAKER  2016     Left Subclavian Line, Triple Lumen      Gastric Ulcer, Dieulfoy lesion, hemostatic clips placed - Massive GI Bleed - Transferred to [facility]     RELEASE CARPAL TUNNEL      RIGHT - Carpal Tunnel Syndrome     REPAIR BLADDER       SIGMOIDOSCOPY FLEXIBLE N/A 2019    Procedure: FLEXIBLE SIGMOIDOSCOPY;  Surgeon: Jorge Freeman MD;  Location: [facility] OR       Prior to Admission Medications   Prior to Admission Medications   Prescriptions Last Dose Informant Patient Reported? Taking?   Ascorbic Acid (VITAMIN C) 500 MG CAPS   Yes No   Sig: Take 500 mg by mouth daily   LORazepam (ATIVAN) 0.5 MG tablet   No No   Sig: TAKE 1 TABLET BY MOUTH EVERY 8 HOURS AS NEEDED FOR ANXIETY   Multiple Vitamins-Minerals (MULTIVITAMIN ADULT PO)   Yes No   Sig: Take 200 mg by mouth daily   STATIN NOT PRESCRIBED, INTENTIONAL,  Self No No   Sig: Statin not prescribed intentionally due to Other patient declines (This option does not exclude patient from measure)   acetaminophen (TYLENOL) 500 MG tablet  Self No No   Sig: Take 2 tablet (1000mg) by oral route every 6 hours as needed   calcium carbonate (OS-PEARL) 1500 (600 Ca) MG tablet   Yes No    Sig: Take 600 mg by mouth daily   isosorbide mononitrate (IMDUR) 30 MG 24 hr tablet  Self No No   Sig: TAKE ONE TABLET BY MOUTH AT BEDTIME.   nitroGLYcerin (NITROSTAT) 0.4 MG sublingual tablet  Self No No   Sig: Place 1 tablet (0.4mg) by sublingual route at the first sign of attack; may repeat ever 5 min until relief; if pain persists after 3 tablets in 15 minutes prompt medical attention is recommended. AS NEEDED   pantoprazole (PROTONIX) 40 MG EC tablet   No No   Sig: Take 1 tablet (40 mg) by mouth 2 times daily   potassium 99 MG TABS   Yes No   Sig: Take 99 mg by mouth daily   triamterene-HCTZ (DYAZIDE) 37.5-25 MG capsule  Self No No   Sig: Take 1 capsule by mouth daily      Facility-Administered Medications: None     Allergies   Allergies   Allergen Reactions     [place] Juice Other (See Comments), Nausea and GI Disturbance     Vertigo     Food      Oranges.     Naprosyn [Naproxen]      Incontinence       Niacin Swelling       Social History   I have reviewed this patient's social history and updated it with pertinent information if needed. Lauren Barron  reports that she has never smoked. She has never used smokeless tobacco. She reports that she does not drink alcohol or use drugs.    Family History   I have reviewed this patient's family history and updated it with pertinent information if needed.   Family History   Problem Relation Age of Onset     Cancer Brother         Pancreatic     Cancer Brother         Prostate     Cancer Sister         Breast     Hypertension Brother      Hypertension Father      Hypertension Sister      Hypertension Mother      Aneurysm Daughter 68        cerebral, sudden death     Osteoporosis Other      Thyroid Disease No family hx of      Diabetes No family hx of        Review of Systems   Review of systems is limited by patient factors - abnormal mental status  CONSTITUTIONAL:  positive for  fatigue and malaise  negative for  fevers, chills and sweats  HEENT:  negative for   ear drainage, nasal congestion and sore throat  RESPIRATORY:  negative for  dry cough, cough with sputum, dyspnea and chest pain  CARDIOVASCULAR:  negative for  chest pain, dyspnea, palpitations, orthopnea, edema  GASTROINTESTINAL:  positive for nausea and abdominal pain  negative for vomiting and change in bowel habits  GENITOURINARY:  positive for frequency and dysuria  negative for urinary incontinence and hematuria  MUSCULOSKELETAL:  positive for  muscle weakness  negative for  myalgias and arthralgias  NEUROLOGICAL:  positive for memory problems and weakness  negative for headaches, dizziness, numbness and syncope  BEHAVIOR/PSYCH:  negative for decreased sleep, increased sleep, decreased energy level, increased energy level, agitated and anxiety    Physical Exam   Temp: 97.4  F (36.3  C) Temp src: Tympanic BP: 159/66   Heart Rate: 64 Resp: 20 SpO2: 98 % O2 Device: None (Room air)    Vital Signs with Ranges  Temp:  [97.1  F (36.2  C)-97.4  F (36.3  C)] 97.4  F (36.3  C)  Pulse:  [60] 60  Heart Rate:  [64] 64  Resp:  [20] 20  BP: (138-159)/(66-68) 159/66  SpO2:  [98 %] 98 %  113 lbs 5.06 oz    Constitutional: Awake,alert, no acute distress  HEENT: Mucous membranes mildly dry, pink, no cervical lymphadenopathy  Respiratory: Clear lungs bilaterally, no wheezes, crackles or rhonchi  Cardiovascular: HRR, faint murmur, no peripheral edema.   GI: Flat,soft,nontender, bowel sound are positive by hypoactive.  Skin: Pale, no unusual bruising, open areas or rashes.   Musculoskeletal: Moves all extremities well ,no unilateral weakness.   Neurologic: Confused to recent events but is alert and oriented to self, place, time.     Data   Data reviewed today:   Recent Labs   Lab 10/14/19  1410 10/10/19  0954   WBC 4.0  --    HGB 9.1* 8.7*   MCV 81  --      --    *  --    POTASSIUM 4.2  --    CHLORIDE 82*  --    CO2 24  --    BUN 23  --    CR 1.00  --    ANIONGAP 9  --    PEARL 8.5  --    *  --    ALBUMIN 3.7  --     PROTTOTAL 7.1  --    BILITOTAL 0.4  --    ALKPHOS 55  --    ALT 35  --    AST 30  --        No results found for this or any previous visit (from the past 24 hour(s)).

## 2019-10-14 NOTE — NURSING NOTE
Chief Complaint   Patient presents with     RECHECK       Initial /68   Pulse 60   Temp 97.1  F (36.2  C) (Tympanic)   Resp 20   Ht 1.524 m (5')   Wt 50.8 kg (112 lb)   SpO2 98%   BMI 21.87 kg/m   Estimated body mass index is 21.87 kg/m  as calculated from the following:    Height as of this encounter: 1.524 m (5').    Weight as of this encounter: 50.8 kg (112 lb).  Medication Reconciliation: complete  Lucero Mckeon LPN

## 2019-10-14 NOTE — TELEPHONE ENCOUNTER
"Patient took 2 tablets of the medication. Patient has vomited from the medication one time. Patient no longer has the burning from the UTI. Patient has been having pain in her stomach. Patient also has ulcers and a hernia and patient has been gagging over nothing, but has not been vomiting. Daughter is concerned if she needs new medication. Patient's daughter stated the pain in the stomach is gone but she is still gagging on nothing. Daughter would like a return call at [phone] Daughter's questions are if she should start another medication for the UTI, if she needs to be seen for the stomach pain she had and if she should be seen for the \"gagging on nothing?\" Please advise   "

## 2019-10-15 LAB
ANION GAP SERPL CALCULATED.3IONS-SCNC: 7 MMOL/L (ref 3–14)
ANION GAP SERPL CALCULATED.3IONS-SCNC: 8 MMOL/L (ref 3–14)
BACTERIA SPEC CULT: NORMAL
BASOPHILS # BLD AUTO: 0 10E9/L (ref 0–0.2)
BASOPHILS NFR BLD AUTO: 0.3 %
BUN SERPL-MCNC: 21 MG/DL (ref 7–30)
BUN SERPL-MCNC: 28 MG/DL (ref 7–30)
CALCIUM SERPL-MCNC: 8.3 MG/DL (ref 8.5–10.1)
CALCIUM SERPL-MCNC: 8.3 MG/DL (ref 8.5–10.1)
CHLORIDE SERPL-SCNC: 88 MMOL/L (ref 94–109)
CHLORIDE SERPL-SCNC: 90 MMOL/L (ref 94–109)
CO2 SERPL-SCNC: 24 MMOL/L (ref 20–32)
CO2 SERPL-SCNC: 25 MMOL/L (ref 20–32)
CREAT SERPL-MCNC: 0.96 MG/DL (ref 0.52–1.04)
CREAT SERPL-MCNC: 1.21 MG/DL (ref 0.52–1.04)
DIFFERENTIAL METHOD BLD: ABNORMAL
EOSINOPHIL # BLD AUTO: 0 10E9/L (ref 0–0.7)
EOSINOPHIL NFR BLD AUTO: 0.8 %
ERYTHROCYTE [DISTWIDTH] IN BLOOD BY AUTOMATED COUNT: 12.5 % (ref 10–15)
GFR SERPL CREATININE-BSD FRML MDRD: 38 ML/MIN/{1.73_M2}
GFR SERPL CREATININE-BSD FRML MDRD: 51 ML/MIN/{1.73_M2}
GLUCOSE SERPL-MCNC: 92 MG/DL (ref 70–99)
GLUCOSE SERPL-MCNC: 95 MG/DL (ref 70–99)
HCT VFR BLD AUTO: 24.2 % (ref 35–47)
HGB BLD-MCNC: 8.4 G/DL (ref 11.7–15.7)
IMM GRANULOCYTES # BLD: 0 10E9/L (ref 0–0.4)
IMM GRANULOCYTES NFR BLD: 0.3 %
LYMPHOCYTES # BLD AUTO: 1 10E9/L (ref 0.8–5.3)
LYMPHOCYTES NFR BLD AUTO: 27.6 %
MCH RBC QN AUTO: 28.8 PG (ref 26.5–33)
MCHC RBC AUTO-ENTMCNC: 34.7 G/DL (ref 31.5–36.5)
MCV RBC AUTO: 83 FL (ref 78–100)
MONOCYTES # BLD AUTO: 0.6 10E9/L (ref 0–1.3)
MONOCYTES NFR BLD AUTO: 17.1 %
NEUTROPHILS # BLD AUTO: 2 10E9/L (ref 1.6–8.3)
NEUTROPHILS NFR BLD AUTO: 53.9 %
NRBC # BLD AUTO: 0 10*3/UL
NRBC BLD AUTO-RTO: 0 /100
PLATELET # BLD AUTO: 267 10E9/L (ref 150–450)
POTASSIUM SERPL-SCNC: 4 MMOL/L (ref 3.4–5.3)
POTASSIUM SERPL-SCNC: 4.1 MMOL/L (ref 3.4–5.3)
RBC # BLD AUTO: 2.92 10E12/L (ref 3.8–5.2)
SODIUM SERPL-SCNC: 120 MMOL/L (ref 133–144)
SODIUM SERPL-SCNC: 122 MMOL/L (ref 133–144)
SODIUM SERPL-SCNC: 122 MMOL/L (ref 133–144)
SPECIMEN SOURCE: NORMAL
WBC # BLD AUTO: 3.7 10E9/L (ref 4–11)

## 2019-10-15 PROCEDURE — 84295 ASSAY OF SERUM SODIUM: CPT | Performed by: NURSE PRACTITIONER

## 2019-10-15 PROCEDURE — 25000132 ZZH RX MED GY IP 250 OP 250 PS 637: Performed by: NURSE PRACTITIONER

## 2019-10-15 PROCEDURE — 85025 COMPLETE CBC W/AUTO DIFF WBC: CPT | Performed by: NURSE PRACTITIONER

## 2019-10-15 PROCEDURE — 12000000 ZZH R&B MED SURG/OB

## 2019-10-15 PROCEDURE — 36415 COLL VENOUS BLD VENIPUNCTURE: CPT | Performed by: NURSE PRACTITIONER

## 2019-10-15 PROCEDURE — 25800030 ZZH RX IP 258 OP 636: Performed by: INTERNAL MEDICINE

## 2019-10-15 PROCEDURE — 80048 BASIC METABOLIC PNL TOTAL CA: CPT | Performed by: NURSE PRACTITIONER

## 2019-10-15 PROCEDURE — 99232 SBSQ HOSP IP/OBS MODERATE 35: CPT | Performed by: NURSE PRACTITIONER

## 2019-10-15 RX ADMIN — CEFDINIR 300 MG: 300 CAPSULE ORAL at 16:54

## 2019-10-15 RX ADMIN — ISOSORBIDE MONONITRATE 30 MG: 30 TABLET, EXTENDED RELEASE ORAL at 20:38

## 2019-10-15 RX ADMIN — SODIUM CHLORIDE: 9 INJECTION, SOLUTION INTRAVENOUS at 01:10

## 2019-10-15 RX ADMIN — PANTOPRAZOLE SODIUM 40 MG: 40 TABLET, DELAYED RELEASE ORAL at 20:38

## 2019-10-15 RX ADMIN — PANTOPRAZOLE SODIUM 40 MG: 40 TABLET, DELAYED RELEASE ORAL at 08:05

## 2019-10-15 ASSESSMENT — ACTIVITIES OF DAILY LIVING (ADL)
ADLS_ACUITY_SCORE: 18
ADLS_ACUITY_SCORE: 19
ADLS_ACUITY_SCORE: 20

## 2019-10-15 ASSESSMENT — MIFFLIN-ST. JEOR: SCORE: 867.25

## 2019-10-15 NOTE — PROVIDER NOTIFICATION
DATE:  10/14/2019   TIME OF RECEIPT FROM LAB:  9050  LAB TEST:  Sodium  LAB VALUE:  119  RESULTS GIVEN WITH READ-BACK TO (PROVIDER):  Dr. Vigil  TIME LAB VALUE REPORTED TO PROVIDER:   2300    Ordered to decrease NS infusion to 100mL/hr

## 2019-10-15 NOTE — PHARMACY
[facility]    Pharmacy      Antimicrobial Stewardship Note     Current antimicrobial therapy:  Anti-infectives (From now, onward)    Start     Dose/Rate Route Frequency Ordered Stop    10/14/19 1700  cefdinir (OMNICEF) capsule 300 mg      300 mg Oral DAILY WITH SUPPER 10/14/19 1612            Indication: UTI    Days of Therapy: 2     Pertinent labs:  Creatinine   Creatinine   Date Value Ref Range Status   10/15/2019 0.96 0.52 - 1.04 mg/dL Final   10/14/2019 0.97 0.52 - 1.04 mg/dL Final   10/14/2019 1.00 0.52 - 1.04 mg/dL Final     WBC   WBC   Date Value Ref Range Status   10/15/2019 3.7 (L) 4.0 - 11.0 10e9/L Final   10/14/2019 4.0 4.0 - 11.0 10e9/L Final   10/03/2019 6.5 4.0 - 11.0 10e9/L Final     Procalcitonin No results found for: PCAL  CRP No results found for: CRP    Culture Results:   Collected Updated Procedure Result Status    10/14/2019 1720 10/15/2019 0718 Active MRSA Surveillance Culture [[id number]]   Nares from Nose    Preliminary result Specimen Description Nares   Culture Micro Culture in progress P           10/14/2019 1418 10/15/2019 0733 Urine Culture Aerobic Bacterial [[id number]]   Midstream Urine    Preliminary result Specimen Description Midstream Urine   Culture Micro Culture in progress P           10/10/2019 1245 10/12/2019 0606 Urine Culture Aerobic Bacterial []    (Abnormal)   Midstream Urine    Final result Specimen Description Midstream Urine   Culture Micro >100,000 colonies/mL   Escherichia coli   Abnormal              Escherichia coli     Antibiotic Interpretation Sensitivity Method Status    Amoxicillin/Clav Sensitive 4 ug/mL RICHIE Final    AMPICILLIN Sensitive 8 ug/mL RICHIE Final    AMPICILLIN/SULBACTAM Sensitive 4 ug/mL RICHIE Final    CEFAZOLIN Sensitive <=4 ug/mL RICHIE Final     Cefazolin RICHIE breakpoints are for the treatment of uncomplicated urinary tract   infections.  For the treatment of systemic infections, please contact the   laboratory for additional testing.   CEFEPIME  Sensitive <=1 ug/mL RICHIE Final    CEFTAZIDIME Sensitive <=1 ug/mL RICHIE Final    CEFTRIAXONE Sensitive <=1 ug/mL RICHIE Final    CIPROFLOXACIN Sensitive <=0.25 ug/mL RICHIE Final    Ext Spect B Lac Prod* Sensitive Negative  RICHIE Final    GENTAMICIN Sensitive 2 ug/mL RICHIE Final    IMIPENEM* Sensitive 1 ug/mL RICHIE Final    LEVOFLOXACIN Sensitive <=0.12 ug/mL RICHIE Final    NITROFURANTOIN Sensitive <=16 ug/mL RICHIE Final    Piperacillin/Tazo Sensitive <=4 ug/mL RICHIE Final    TOBRAMYCIN Sensitive <=1 ug/mL RICHIE Final    Trimethoprim/Sulfa Sensitive <=1/19 ug/mL RICHIE Final    * Suppressed Antibiotic       Recommendations/Interventions:  1. Recommend adding stop date to cefdinir for a total duration of 5-7 days.      Socorro Hebert, [facility]  October 15, 2019

## 2019-10-15 NOTE — PLAN OF CARE
Face to face report given with opportunity to observe patient.  Report given to HEAVEN Calhonu.    Karina Maxwell RN  10/15/2019, 3:53 PM

## 2019-10-15 NOTE — PLAN OF CARE
"Reason for hospital stay:  UTI, Hyponatremia    Most recent vitals: /50   Temp 97.7  F (36.5  C) (Tympanic)   Resp 16   Ht 1.575 m (5' 2\")   Wt 51.4 kg (113 lb 5.1 oz)   SpO2 98%   BMI 20.73 kg/m      Pain Management:  Denied any pain this shift.     Orientation:  A+O x4 but does have some forgetfulness at times.     Cardiac:  AP irregular.  /50 at HS and scheduled to receive IMDUR - updated Dr. Bragg and received okay to  Give IMDUR.     Respiratory:  Lungs clear, denies any dyspnea or shortness of breath. Sats 98% on RA.    GI:  Bowel sounds present x4 quadrants. Fair appetite - ate approx 50% mashed potatoes, gravy and rast beef for supper. Tolerating 1500mL fluid restriction without difficulty.       :  WDL - denies any issues voiding    IVF:  NS infusing at 125mL/hr    ABX:  Started on PO omnicef     Mobility:  Up with standby assist, gait belt.     Safety:  Alarms on     Comments: Magnesium 1.8 and no replacement indicated. Sodium recheck at 6:08pm 116 and at 10:22 was up to 119 (see provider notifications).    Face to face report given with opportunity to observe patient.    Report given to Racquel Cunningham RN   10/14/2019  11:29 PM  "

## 2019-10-15 NOTE — PLAN OF CARE
Pt alert and oriented but forgetful at times. She denies pain. VSS. Up to bathroom with SBA, voiding well. IVF continued throughout night, saline locked this AM. 1500 mL fluid restriction remains in place, pt has had 120 mLs of water in. Alarms on, call light within reach and pt calls appropriately.     Face to face report given with opportunity to observe patient.    Report given to HEAVEN Monte RN   10/15/2019  7:36 AM

## 2019-10-15 NOTE — PROGRESS NOTES
Assessment completed see flow sheet.    LOC: alert   Others present: Patient     Dx: hyponatremia, UTI  Chronic Disease Management: HTN, hyperlipidemia, diastolic dysfunction, CKD, cardiovascular disease.    Lives with: alone  Living at:  Home in [place]  Transportation: YES, drives self    Primary PCP: Vandana Jones  Insurance:  [facility]  Medicare: Inpatient letter reviewed with Lauren.    Support System:  family  Homecare/PCA: no  /County Services:   no  [place]: NO      How was the [facility] notification completed: n/a    Health Care Directive: YES, on file  Guardian: no  POA: no    Pharmacy: [facility]  Meds management: YES, manages own    Adequate Resources for needs (housing, utilities, food/med): YES  Household chores: Lauren does inside, family does outside  Work/community/social activity: YES, very social, gets out on a regular basis    ADLs: independent  Ambulation:uses cane only for long distances  Falls: no  Nutrition: no concern  Sleep: sleeps well    Equipment used: cane      Oxygen supplier: no      Does the supplier have valid oxygen orders: n/a    Mental health: no  Substance abuse: no  Exposure to violence/abuse: no  Stressors: denies    Able to Return to Prior Living Arrangements: YES    Choice of Vendor: n/a    Barriers: none    JOSEFA: elevated    Plan: home, family to transport.

## 2019-10-15 NOTE — PLAN OF CARE
"Reason for hospital stay:  Patient is admitted for acute hyponatremia and a UTI.     Most recent vitals: /56   Temp 97.8  F (36.6  C) (Tympanic)   Resp 16   Ht 1.575 m (5' 2\")   Wt 50.9 kg (112 lb 3.4 oz)   SpO2 98%   BMI 20.52 kg/m      Pain Management: Patient denied any pain this shift.     Orientation:  Patient is alert and oriented. She answers all questions appropriately but is forgetful on short term/new concepts. She asked this nurse approx. 6 times in a 15 minute period why she is hospitalized. She said \"I know that I am here for two reasons but I can't remember what they are and what we are doing about them.\" The first couple of times, this nurse just re-explained. Then this nurse wrote all the information down for the patient and she really appreciated that. She then read and re-read them over and over.     Cardiac: AP regular. Pt. Has a permanent defibrillator/pacemaker (according to the patient).  BP and HR WNL.     Respiratory: Lung Sounds CTA. No supplemental O2 required.      GI: Bowel sounds audible and active.     : Patient is voiding appropriately.     Nutrition: Patient is on a regular diet with a 1500 ml fluid restriction.     Skin Issues: See PCS for skin assessment.     IVF: IVSL    ABX: Omnicef PO.     Mobility: Patient uses a cane at home but does not have a cane here. She is using her IV pole as a cane at this time. 1 assist with gait belt and pole/cane. Update: At 14:30, her daughter brought her cane.     Safety:  Bed in low position, brakes on, call light in reach, bed and chair alarms on.     Comments: Patient talked about wanting to go home many times this shift. Each time this nurse checked on the pt., she stated she wants to go to her home because her stuff is there and she would be more comfortable. This nurse offered the newspaper, magazines, and television to try and help pass the time. She did have several visitors (family) today as well.       10/15/2019  2:10 " PM  Karina Maxwell RN

## 2019-10-15 NOTE — PLAN OF CARE
Alert, oriented. Forgetful at times. VSS and afebrile. Denies pain. O2 sats adequate on room air. Lungs clear but diminished. Tolerates diet. Remains within 1500 ml fluid restriction. IV saline locked. Up to the commode stand by assist. Alarms active and audible. Will continue to monitor.

## 2019-10-15 NOTE — PROGRESS NOTES
"CLINICAL NUTRITION SERVICES  -  ASSESSMENT NOTE    Lauren Barron : Admission Nutrition Risk Screen - reduced oral intake    89 yof admitted acute hyponatremia.Pt also being treated for UTI and anemia,  Confusion is improving. Pt has a hx of hypertension, hyperlipidemia, diastolic dysfunction, CKD stage 3 and arteriosclerotic cardiovascular disease. Pt reports her appetite/intake is ok. Mostly she dislikes having to cook for one, but she will make meals for herself. She has 1 Ensure (possibly Plus) in the morning with her breakfast. Pt believes her weight has been fairly stable for the past few months. Usual body weight reported from the pt is around 115lb. Appetite has been good today with eating 100% of her breakfast and she had an almost clean plate for lunch.    Diet Order: Regular with 1500ml fluid restriction  Intake: % of 2 meals documented this admission    Height: 5' 2\"  Weight: 112 lbs 3.43 oz  Body mass index is 20.52 kg/m .  Weight Status:  Normal BMI  IBWR: 101-135lb  Weight History: weight fairly stable  Wt Readings from Last 10 Encounters:   10/15/19 50.9 kg (112 lb 3.4 oz)   10/14/19 50.8 kg (112 lb)   10/03/19 50.8 kg (112 lb)   09/25/19 51.8 kg (114 lb 3.2 oz)   07/22/19 50.8 kg (112 lb)   06/25/19 53.5 kg (118 lb)   05/21/19 53.1 kg (117 lb)   10/29/18 51.7 kg (114 lb)   08/14/18 52.2 kg (115 lb)   05/24/18 52.2 kg (115 lb)     Calculated using current body weight- 112lb (50.9kg)  Estimated Energy Needs: 1321-7858 kcals (25-30 Kcal/Kg)   Estimated Protein Needs: 50-60 grams protein (1-1.2 g pro/Kg)    Malnutrition Diagnosis: Patient does not meet two of the criteria necessary for diagnosing malnutrition. Suspect pt is undernourished due to reduced intake from advanced age. Pt is petite. 115lb is typical weight for past 2 years, and was around 125lb before that time. High risk for malnutrition.    NUTRITION RECOMMENDATIONS  - Encourage small frequent meals  - Will send Ensure with " breakfast (prefers Vanilla). Encourage additional supplements if intake is poor to maintain weight/promote healthy weight gain.  - Monitor weight    MONITORING AND EVALUATION:  RD will monitor intake, weight, labs

## 2019-10-15 NOTE — PROGRESS NOTES
[facility]    Hospitalist Progress Note    Date of Service (when I saw the patient): 10/15/2019    Assessment & Plan     Acute on chronic hyponatremia: Likely due to increased water intake with UTI plus diazide. Over the last years she hs had sodium level of 127 and 130. Will place 1500cc fluid restriction, gentle IVF NS overnight as she does appear mildly dry. Hold diazide-she already took this AM dose.  She is mildly symptomatic with fatigue, mild confusion. Recheck sodium in 2 hours, will adjust IVF if needed for goal to get her above 120meq then 24 hour goal of 4-6meq.  -10/15: Mentation improved this morning, sodium 122, recheck at noon and 6pm. Saline lock.     UTI: Took 2 doses of bactrim but she had abdominal pain after both then started having nausea. Pan-senstive e.coli on culture will treat with omnicef.      Abdominal pain with nausea: began after starting bactrim. She was discharged from hospital 9/27 with gastric ulcer-likely irritated with Bactrim. Switch to Omnicef, continue PPI.     Anemia, subacute blood loss: Recent hospitalization for GI bleed. Hemoglobin was 8.7 on 10/10, 9.1 10/14. Considering IVF dilution stable this morning at 8.4. Asymptomatic.       Essential hypertension: Blood pressure mildly elevated on arrival, will monitor.        Diastolic dysfunction: No systolic heart failure on echo 2015, she does have moderate mitral regurgitation as well. Will get daily weights and strict I/O.        Pulmonary fibrosis (H): No signs of acute respiratory illness. No hypoxia.        CKD (chronic kidney disease) stage 3, GFR 30-59 ml/min (H): Creatinine baseline  9 hours-1.2, today 1.0. Will monitor for changes.       DVT Prophylaxis: Pneumatic Compression Devices  Code Status: Full Code    Disposition: Expected discharge in 1-2 days once sodium normal.    Heidi Langford, CNP    Interval History   Denies chest pain, dyspnea, abdominal pain or nausea. She has not had any gagging or  dry heaving that was reported from home.     -Data reviewed today: I reviewed all new labs and imaging results over the last 24 hours. I personally reviewed no images or EKG's today.    Physical Exam   Temp: 97.8  F (36.6  C) Temp src: Tympanic BP: 136/56   Heart Rate: 60 Resp: 16 SpO2: 98 % O2 Device: None (Room air)    Vitals:    10/14/19 1548 10/15/19 0356   Weight: 51.4 kg (113 lb 5.1 oz) 50.9 kg (112 lb 3.4 oz)     Vital Signs with Ranges  Temp:  [97.1  F (36.2  C)-98.2  F (36.8  C)] 97.8  F (36.6  C)  Pulse:  [60] 60  Heart Rate:  [60-65] 60  Resp:  [16-20] 16  BP: (124-159)/(50-68) 136/56  SpO2:  [98 %] 98 %  I/O last 3 completed shifts:  In: 1896 [P.O.:420; I.V.:1476]  Out: 2300 [Urine:2300]    Peripheral IV 10/14/19 Right Lower forearm (Active)   Site Assessment WDL 10/15/2019  7:52 AM   Line Status Saline locked 10/15/2019  7:52 AM   Phlebitis Scale 0-->no symptoms 10/15/2019  7:52 AM   Infiltration Scale 0 10/15/2019  7:52 AM   Extravasation? No 10/15/2019  7:52 AM   Number of days: 1     Line/device assessment(s) completed for medical necessity    Constitutional: Awake,alert, no acute distress  Respiratory: Clear bilaterally, no wheezes, crackles or rhonchi.  Cardiovascular: HRR, + murmur, no peripheral edema.   GI: Soft,nontender, bowel sounds positive.   Skin/Integumen: No unusual bruising, no rashes or open areas.        Medications       cefdinir  300 mg Oral Daily with supper     isosorbide mononitrate  30 mg Oral At Bedtime     pantoprazole  40 mg Oral BID     sodium chloride (PF)  3 mL Intracatheter Q8H       Data   Recent Labs   Lab 10/15/19  0510 10/14/19  2222 10/14/19  1808 10/14/19  1410  10/10/19  0954   WBC 3.7*  --   --  4.0  --   --    HGB 8.4*  --   --  9.1*  --  8.7*   MCV 83  --   --  81  --   --      --   --  298  --   --    * 119* 116* 115*   < >  --    POTASSIUM 4.1  --  4.1 4.2  --   --    CHLORIDE 90*  --  83* 82*  --   --    CO2 25  --  25 24  --   --    BUN 21  --   23 23  --   --    CR 0.96  --  0.97 1.00  --   --    ANIONGAP 7  --  8 9  --   --    PEARL 8.3*  --  8.3* 8.5  --   --    GLC 95  --  127* 105*  --   --    ALBUMIN  --   --   --  3.7  --   --    PROTTOTAL  --   --   --  7.1  --   --    BILITOTAL  --   --   --  0.4  --   --    ALKPHOS  --   --   --  55  --   --    ALT  --   --   --  35  --   --    AST  --   --   --  30  --   --     < > = values in this interval not displayed.       No results found for this or any previous visit (from the past 24 hour(s)).

## 2019-10-16 LAB
ANION GAP SERPL CALCULATED.3IONS-SCNC: 6 MMOL/L (ref 3–14)
ANION GAP SERPL CALCULATED.3IONS-SCNC: 7 MMOL/L (ref 3–14)
BACTERIA SPEC CULT: NORMAL
BUN SERPL-MCNC: 29 MG/DL (ref 7–30)
BUN SERPL-MCNC: 30 MG/DL (ref 7–30)
CALCIUM SERPL-MCNC: 8.2 MG/DL (ref 8.5–10.1)
CALCIUM SERPL-MCNC: 8.6 MG/DL (ref 8.5–10.1)
CHLORIDE SERPL-SCNC: 92 MMOL/L (ref 94–109)
CHLORIDE SERPL-SCNC: 97 MMOL/L (ref 94–109)
CO2 SERPL-SCNC: 24 MMOL/L (ref 20–32)
CO2 SERPL-SCNC: 25 MMOL/L (ref 20–32)
CREAT SERPL-MCNC: 0.99 MG/DL (ref 0.52–1.04)
CREAT SERPL-MCNC: 1.08 MG/DL (ref 0.52–1.04)
ERYTHROCYTE [DISTWIDTH] IN BLOOD BY AUTOMATED COUNT: 12.5 % (ref 10–15)
GFR SERPL CREATININE-BSD FRML MDRD: 44 ML/MIN/{1.73_M2}
GFR SERPL CREATININE-BSD FRML MDRD: 49 ML/MIN/{1.73_M2}
GLUCOSE SERPL-MCNC: 118 MG/DL (ref 70–99)
GLUCOSE SERPL-MCNC: 91 MG/DL (ref 70–99)
HCT VFR BLD AUTO: 24.7 % (ref 35–47)
HGB BLD-MCNC: 8.6 G/DL (ref 11.7–15.7)
MCH RBC QN AUTO: 28.6 PG (ref 26.5–33)
MCHC RBC AUTO-ENTMCNC: 34.8 G/DL (ref 31.5–36.5)
MCV RBC AUTO: 82 FL (ref 78–100)
OSMOLALITY UR: 403 MMOL/KG (ref 100–1200)
PLATELET # BLD AUTO: 255 10E9/L (ref 150–450)
POTASSIUM SERPL-SCNC: 4 MMOL/L (ref 3.4–5.3)
POTASSIUM SERPL-SCNC: 4.1 MMOL/L (ref 3.4–5.3)
RBC # BLD AUTO: 3.01 10E12/L (ref 3.8–5.2)
SODIUM SERPL-SCNC: 123 MMOL/L (ref 133–144)
SODIUM SERPL-SCNC: 128 MMOL/L (ref 133–144)
SODIUM UR-SCNC: 29 MMOL/L
SPECIMEN SOURCE: NORMAL
WBC # BLD AUTO: 5.3 10E9/L (ref 4–11)

## 2019-10-16 PROCEDURE — 85027 COMPLETE CBC AUTOMATED: CPT | Performed by: NURSE PRACTITIONER

## 2019-10-16 PROCEDURE — 25000132 ZZH RX MED GY IP 250 OP 250 PS 637: Performed by: NURSE PRACTITIONER

## 2019-10-16 PROCEDURE — 80048 BASIC METABOLIC PNL TOTAL CA: CPT | Performed by: NURSE PRACTITIONER

## 2019-10-16 PROCEDURE — 12000000 ZZH R&B MED SURG/OB

## 2019-10-16 PROCEDURE — 84300 ASSAY OF URINE SODIUM: CPT | Performed by: NURSE PRACTITIONER

## 2019-10-16 PROCEDURE — 25800030 ZZH RX IP 258 OP 636: Performed by: NURSE PRACTITIONER

## 2019-10-16 PROCEDURE — 83935 ASSAY OF URINE OSMOLALITY: CPT | Performed by: NURSE PRACTITIONER

## 2019-10-16 PROCEDURE — 99232 SBSQ HOSP IP/OBS MODERATE 35: CPT | Performed by: NURSE PRACTITIONER

## 2019-10-16 PROCEDURE — 36415 COLL VENOUS BLD VENIPUNCTURE: CPT | Performed by: NURSE PRACTITIONER

## 2019-10-16 RX ORDER — SODIUM CHLORIDE 9 MG/ML
INJECTION, SOLUTION INTRAVENOUS CONTINUOUS
Status: DISCONTINUED | OUTPATIENT
Start: 2019-10-16 | End: 2019-10-17 | Stop reason: HOSPADM

## 2019-10-16 RX ADMIN — CEFDINIR 300 MG: 300 CAPSULE ORAL at 16:54

## 2019-10-16 RX ADMIN — PANTOPRAZOLE SODIUM 40 MG: 40 TABLET, DELAYED RELEASE ORAL at 21:26

## 2019-10-16 RX ADMIN — PANTOPRAZOLE SODIUM 40 MG: 40 TABLET, DELAYED RELEASE ORAL at 09:25

## 2019-10-16 RX ADMIN — ISOSORBIDE MONONITRATE 30 MG: 30 TABLET, EXTENDED RELEASE ORAL at 21:26

## 2019-10-16 RX ADMIN — SODIUM CHLORIDE, PRESERVATIVE FREE: 5 INJECTION INTRAVENOUS at 07:50

## 2019-10-16 ASSESSMENT — ACTIVITIES OF DAILY LIVING (ADL)
ADLS_ACUITY_SCORE: 18
ADLS_ACUITY_SCORE: 20
ADLS_ACUITY_SCORE: 18
ADLS_ACUITY_SCORE: 18

## 2019-10-16 ASSESSMENT — MIFFLIN-ST. JEOR: SCORE: 860.25

## 2019-10-16 NOTE — PROGRESS NOTES
[facility]    Hospitalist Progress Note    Date of Service (when I saw the patient): 10/16/2019    Assessment & Plan     Acute on chronic hyponatremia: Likely due to increased water intake with UTI plus diazide. Over the last years she hs had sodium level of 127 and 130. Will place 1500cc fluid restriction, gentle IVF NS overnight as she does appear mildly dry. Hold diazide-she already took this AM dose.  She is mildly symptomatic with fatigue, mild confusion. Recheck sodium in 2 hours, will adjust IVF if needed for goal to get her above 120meq then 24 hour goal of 4-6meq.  -10/15: Mentation improved this morning, sodium 122, recheck at noon and 6pm. Saline lock.  -10/16: Sodium 123 this AM, will start NS back at 75cc/hr. Not improving as expected with fluid restriction and holding diazide, will get urine osmo and sodium.     UTI: Took 2 doses of bactrim but she had abdominal pain after both then started having nausea. Pan-senstive e.coli on original culture from 10/10culture will treat with omnicef.      Abdominal pain with nausea: began after starting bactrim. She was discharged from hospital 9/27 with gastric ulcer-likely irritated with Bactrim. Switch to Omnicef, continue PPI.     Anemia, subacute blood loss: Recent hospitalization for GI bleed. Hemoglobin was 8.7 on 10/10, 9.1 10/14. Considering IVF dilution has been stable.       Essential hypertension: Blood pressure mildly elevated on arrival, will monitor.        Diastolic dysfunction: No systolic heart failure on echo 2015, she does have moderate mitral regurgitation as well. Will get daily weights and strict I/O.        Pulmonary fibrosis (H): No signs of acute respiratory illness. No hypoxia.        CKD (chronic kidney disease) stage 3, GFR 30-59 ml/min (H): Creatinine baseline 0.9-1.2,remians at baseline, continue to monitor.       DVT Prophylaxis: Pneumatic Compression Devices  Code Status: Full Code    Disposition: Expected discharge in  1-2 days once sodium normal.    Heidi DALLAS Anastasiya, CNP    Interval History   Denies chest pain, dyspnea, abdominal pain or nausea. She has not had any gagging or dry heaving that was reported from home. Eating well     -Data reviewed today: I reviewed all new labs and imaging results over the last 24 hours. I personally reviewed no images or EKG's today.    Physical Exam   Temp: 98  F (36.7  C) Temp src: Temporal BP: 128/60 Pulse: 65 Heart Rate: 60 Resp: 15 SpO2: 98 % O2 Device: None (Room air)    Vitals:    10/14/19 1548 10/15/19 0356 10/16/19 0500   Weight: 51.4 kg (113 lb 5.1 oz) 50.9 kg (112 lb 3.4 oz) 50.2 kg (110 lb 10.7 oz)     Vital Signs with Ranges  Temp:  [96.6  F (35.9  C)-98  F (36.7  C)] 98  F (36.7  C)  Pulse:  [65] 65  Heart Rate:  [60-65] 60  Resp:  [15-17] 15  BP: (128-163)/(56-62) 128/60  SpO2:  [95 %-100 %] 98 %  I/O last 3 completed shifts:  In: 1420 [P.O.:1420]  Out: 875 [Urine:875]    Peripheral IV 10/14/19 Right Lower forearm (Active)   Site Assessment WDL 10/16/2019  7:00 AM   Line Status Infusing 10/16/2019  7:00 AM   Phlebitis Scale 0-->no symptoms 10/16/2019  7:00 AM   Infiltration Scale 0 10/16/2019  7:00 AM   Extravasation? No 10/15/2019  6:00 PM   Number of days: 2     Line/device assessment(s) completed for medical necessity    Constitutional: Awake,alert, no acute distress  Respiratory: Clear bilaterally, no wheezes, crackles or rhonchi.  Cardiovascular: HRR, + murmur, no peripheral edema.   GI: Soft, nontender, bowel sounds positive.   Skin/Integumen: No unusual bruising, no rashes or open areas.        Medications     sodium chloride 75 mL/hr at 10/16/19 0750       cefdinir  300 mg Oral Daily with supper     isosorbide mononitrate  30 mg Oral At Bedtime     pantoprazole  40 mg Oral BID     sodium chloride (PF)  3 mL Intracatheter Q8H       Data   Recent Labs   Lab 10/16/19  0540 10/15/19  1810 10/15/19  1204 10/15/19  0510  10/14/19  1410   WBC 5.3  --   --  3.7*  --  4.0   HGB  8.6*  --   --  8.4*  --  9.1*   MCV 82  --   --  83  --  81     --   --  267  --  298   * 120* 122* 122*   < > 115*   POTASSIUM 4.0 4.0  --  4.1   < > 4.2   CHLORIDE 92* 88*  --  90*   < > 82*   CO2 25 24  --  25   < > 24   BUN 29 28  --  21   < > 23   CR 0.99 1.21*  --  0.96   < > 1.00   ANIONGAP 6 8  --  7   < > 9   PEARL 8.6 8.3*  --  8.3*   < > 8.5   GLC 91 92  --  95   < > 105*   ALBUMIN  --   --   --   --   --  3.7   PROTTOTAL  --   --   --   --   --  7.1   BILITOTAL  --   --   --   --   --  0.4   ALKPHOS  --   --   --   --   --  55   ALT  --   --   --   --   --  35   AST  --   --   --   --   --  30    < > = values in this interval not displayed.       No results found for this or any previous visit (from the past 24 hour(s)).

## 2019-10-16 NOTE — PLAN OF CARE
"Reason for hospital stay:  UTI, hyponatremia    Most recent vitals: Blood Pressure 134/59 (BP Location: Left arm)   Pulse 65   Temperature 97.8  F (36.6  C) (Temporal)   Respiration 16   Height 1.575 m (5' 2\")   Weight 50.9 kg (112 lb 3.4 oz)   Oxygen Saturation 100%   Body Mass Index 20.52 kg/m      Pain Management:  Denies pain    Orientation:  A&O x 4, forgetful    Cardiac:  Pacemaker/defib, RRR S1S2    Respiratory:  LS clear equal bilaterally on RA    GI:  BS active, abdomen soft/nontender    :  C/o dribbling and frequency, incontinent at times    Nutrition: regular diet 1500 mL fluid restriction    IVF:  SL    ABX:  PO omnicef    Mobility:  Up SBA to bathroom    Safety:  Bed alarm on, does not use call light, very forgetful, steady on feet        10/16/2019  4:43 AM  aCthleen Ferreira RN    Face to face report given with opportunity to observe patient.    Report given to HEAVEN Matson RN   10/16/2019  0700        "

## 2019-10-16 NOTE — PLAN OF CARE
Face to face report given with opportunity to observe patient.    Report given to Cathleen Sandoval RN   10/15/2019  7:15 PM

## 2019-10-16 NOTE — PHARMACY
[facility]    Pharmacy      Antimicrobial Stewardship Note     Current antimicrobial therapy:  Anti-infectives (From now, onward)    Start     Dose/Rate Route Frequency Ordered Stop    10/14/19 1700  cefdinir (OMNICEF) capsule 300 mg      300 mg Oral DAILY WITH SUPPER 10/14/19 1612          Indication: UTI    Days of Therapy: 3     Pertinent labs:  Creatinine   Creatinine   Date Value Ref Range Status   10/16/2019 0.99 0.52 - 1.04 mg/dL Final   10/15/2019 1.21 (H) 0.52 - 1.04 mg/dL Final   10/15/2019 0.96 0.52 - 1.04 mg/dL Final     WBC   WBC   Date Value Ref Range Status   10/16/2019 5.3 4.0 - 11.0 10e9/L Final   10/15/2019 3.7 (L) 4.0 - 11.0 10e9/L Final   10/14/2019 4.0 4.0 - 11.0 10e9/L Final     Procalcitonin No results found for: PCAL  CRP No results found for: CRP    Culture Results:   Collected Updated Procedure Result Status    10/14/2019 1720 10/15/2019 1524 Active MRSA Surveillance Culture [[id number]]   Nares from Nose    Final result Specimen Description Nares   Culture Micro No MRSA isolated           10/14/2019 1418 10/16/2019 0658 Urine Culture Aerobic Bacterial [[id number]]   Midstream Urine    Final result Specimen Description Midstream Urine   Culture Micro <10,000 colonies/mL   mixed urogenital neetu   No further identification or sensitivity done            10/10/2019 1245 10/12/2019 0606 Urine Culture Aerobic Bacterial []    (Abnormal)   Midstream Urine    Final result Specimen Description Midstream Urine   Culture Micro >100,000 colonies/mL   Escherichia coli   Abnormal              Escherichia coli     Antibiotic Interpretation Sensitivity Method Status    Amoxicillin/Clav Sensitive 4 ug/mL RICHIE Final    AMPICILLIN Sensitive 8 ug/mL RICHIE Final    AMPICILLIN/SULBACTAM Sensitive 4 ug/mL RICHIE Final    CEFAZOLIN Sensitive <=4 ug/mL RICHIE Final     Cefazolin RICHIE breakpoints are for the treatment of uncomplicated urinary tract   infections.  For the treatment of systemic infections, please  contact the   laboratory for additional testing.   CEFEPIME Sensitive <=1 ug/mL RICHIE Final    CEFTAZIDIME Sensitive <=1 ug/mL RICHIE Final    CEFTRIAXONE Sensitive <=1 ug/mL RICHIE Final    CIPROFLOXACIN Sensitive <=0.25 ug/mL RICHIE Final    Ext Spect B Lac Prod* Sensitive Negative  RICHIE Final    GENTAMICIN Sensitive 2 ug/mL RICHIE Final    IMIPENEM* Sensitive 1 ug/mL RICHIE Final    LEVOFLOXACIN Sensitive <=0.12 ug/mL RICHIE Final    NITROFURANTOIN Sensitive <=16 ug/mL RICHIE Final    Piperacillin/Tazo Sensitive <=4 ug/mL RICHIE Final    TOBRAMYCIN Sensitive <=1 ug/mL RICHIE Final    Trimethoprim/Sulfa Sensitive <=1/19 ug/mL RICHIE Final    * Suppressed Antibiotic          Recommendations/Interventions:  1. Recommend adding stop date to cefdinir for a total duration of 5-7 days.    Socorro Hebert, [facility]  October 16, 2019

## 2019-10-16 NOTE — PROGRESS NOTES
Face to face report given with opportunity to observe patient.    Report given to Unique JORDAN  10/15/2019  7:03 PM

## 2019-10-17 ENCOUNTER — TELEPHONE (OUTPATIENT)
Dept: FAMILY MEDICINE | Facility: OTHER | Age: 84
End: 2019-10-17

## 2019-10-17 VITALS
HEART RATE: 62 BPM | SYSTOLIC BLOOD PRESSURE: 137 MMHG | OXYGEN SATURATION: 100 % | DIASTOLIC BLOOD PRESSURE: 52 MMHG | HEIGHT: 62 IN | RESPIRATION RATE: 15 BRPM | WEIGHT: 110.67 LBS | BODY MASS INDEX: 20.37 KG/M2 | TEMPERATURE: 97.5 F

## 2019-10-17 LAB
ANION GAP SERPL CALCULATED.3IONS-SCNC: 5 MMOL/L (ref 3–14)
BUN SERPL-MCNC: 26 MG/DL (ref 7–30)
CALCIUM SERPL-MCNC: 8.1 MG/DL (ref 8.5–10.1)
CHLORIDE SERPL-SCNC: 104 MMOL/L (ref 94–109)
CO2 SERPL-SCNC: 24 MMOL/L (ref 20–32)
CREAT SERPL-MCNC: 0.92 MG/DL (ref 0.52–1.04)
ERYTHROCYTE [DISTWIDTH] IN BLOOD BY AUTOMATED COUNT: 13.1 % (ref 10–15)
GFR SERPL CREATININE-BSD FRML MDRD: 54 ML/MIN/{1.73_M2}
GLUCOSE SERPL-MCNC: 89 MG/DL (ref 70–99)
HCT VFR BLD AUTO: 22.2 % (ref 35–47)
HGB BLD-MCNC: 7.7 G/DL (ref 11.7–15.7)
MCH RBC QN AUTO: 29.4 PG (ref 26.5–33)
MCHC RBC AUTO-ENTMCNC: 34.7 G/DL (ref 31.5–36.5)
MCV RBC AUTO: 85 FL (ref 78–100)
PLATELET # BLD AUTO: 237 10E9/L (ref 150–450)
POTASSIUM SERPL-SCNC: 4.2 MMOL/L (ref 3.4–5.3)
RBC # BLD AUTO: 2.62 10E12/L (ref 3.8–5.2)
SODIUM SERPL-SCNC: 133 MMOL/L (ref 133–144)
WBC # BLD AUTO: 5.4 10E9/L (ref 4–11)

## 2019-10-17 PROCEDURE — 99239 HOSP IP/OBS DSCHRG MGMT >30: CPT | Performed by: INTERNAL MEDICINE

## 2019-10-17 PROCEDURE — 85027 COMPLETE CBC AUTOMATED: CPT | Performed by: NURSE PRACTITIONER

## 2019-10-17 PROCEDURE — 80048 BASIC METABOLIC PNL TOTAL CA: CPT | Performed by: NURSE PRACTITIONER

## 2019-10-17 PROCEDURE — 25800030 ZZH RX IP 258 OP 636: Performed by: NURSE PRACTITIONER

## 2019-10-17 PROCEDURE — 25000132 ZZH RX MED GY IP 250 OP 250 PS 637: Performed by: NURSE PRACTITIONER

## 2019-10-17 PROCEDURE — 36415 COLL VENOUS BLD VENIPUNCTURE: CPT | Performed by: NURSE PRACTITIONER

## 2019-10-17 RX ORDER — CEFDINIR 300 MG/1
300 CAPSULE ORAL
Qty: 5 CAPSULE | Refills: 0 | Status: SHIPPED | OUTPATIENT
Start: 2019-10-17 | End: 2019-10-24

## 2019-10-17 RX ADMIN — SODIUM CHLORIDE, PRESERVATIVE FREE: 5 INJECTION INTRAVENOUS at 08:21

## 2019-10-17 RX ADMIN — PANTOPRAZOLE SODIUM 40 MG: 40 TABLET, DELAYED RELEASE ORAL at 08:21

## 2019-10-17 ASSESSMENT — ACTIVITIES OF DAILY LIVING (ADL)
ADLS_ACUITY_SCORE: 20
ADLS_ACUITY_SCORE: 16

## 2019-10-17 NOTE — DISCHARGE SUMMARY
[facility]    Discharge Summary  Hospitalist    Date of Admission:  10/14/2019  Date of Discharge:  10/17/2019  Discharging Provider: Nupur Epperson MD  Date of Service (when I saw the patient): 10/17/19    Discharge Diagnoses   Principal Problem:    Acute hyponatremia (10/14/2019)  Active Problems:    Essential hypertension (12/6/1999)    Diastolic dysfunction (3/12/2012)    Mitral regurgitation (6/2/2015)    Acute posthemorrhagic anemia (10/15/2012)    Pulmonary fibrosis (H) (10/6/2016)    CKD (chronic kidney disease) stage 3, GFR 30-59 ml/min (H) (11/20/2017)      History of Present Illness   Lauren Barron is an 89 year old female who presented with hyponatremia.  Please see admission H+P for additional details.    Hospital Course   Lauren Barron was admitted on 10/14/2019.  She is a pleasant 89-year-old female with history of chronic kidney disease stage III, pulmonary fibrosis, diastolic dysfunction as well as hypertension who presents with acute hyponatremia.  She seems to be chronically hyponatremic around high 120s to low 130s.  She recently was treated with a urinary infection, and she admits to increased water intake over the last several days as well.  In addition she is also on Dyazide.  She has some mild confusion, but was other wise a symptomatic.  She was treated with fluid restriction and gentle IV fluids normal saline.  She was corrected slowly, and discharge sodium level was 133.  She appears back to baseline at this time, and is wanting to go home.  Her blood pressures have been good here, therefore we will discontinue her Dyazide for the time being.  We will finish treating her urinary infection with Omnicef p.o. for 5 more days, will have been 12 days of total treatment from her diagnosis on 10/10.  She does have anemia as well, likely related to her chronic kidney disease.  She has no evidence of clinical blood loss, and her hemoglobin dropped from 8.7-7.7.  This is mostly  dilutional, and she is asymptomatic.  We will hold off on transfusion currently and have her follow-up with her primary care physician within 7 days to get a repeat hemoglobin level as well as a repeat basic metabolic panel to assess her sodium off of her Dyazide.  She is currently stable to discharge home.  Of note, she does live alone however she is moving into the [place] starting in November.  She does have strong family support in the meantime.    Nupur Epperson MD    Significant Results and Procedures   See below    Pending Results   These results will be followed up by Vandana Jones    Unresulted Labs Ordered in the Past 30 Days of this Admission     No orders found from 9/14/2019 to 10/15/2019.          Code Status   Full Code       Primary Care Physician   Vandana Jones    Physical Exam   Temp: 97.5  F (36.4  C) Temp src: Tympanic BP: 137/52 Pulse: 62 Heart Rate: 62 Resp: 15 SpO2: 100 % O2 Device: None (Room air)    Vitals:    10/14/19 1548 10/15/19 0356 10/16/19 0500   Weight: 51.4 kg (113 lb 5.1 oz) 50.9 kg (112 lb 3.4 oz) 50.2 kg (110 lb 10.7 oz)     Vital Signs with Ranges  Temp:  [97.5  F (36.4  C)-98.9  F (37.2  C)] 97.5  F (36.4  C)  Pulse:  [62] 62  Heart Rate:  [60-62] 62  Resp:  [15-16] 15  BP: (128-151)/(47-59) 137/52  SpO2:  [97 %-100 %] 100 %  I/O last 3 completed shifts:  In: 1492 [P.O.:680; I.V.:812]  Out: 775 [Urine:775]    Constitutional: AA, NAD  Eyes: PERRLA, no injection, no icterus  HEENT: atraumatic, normocephalic  Respiratory: CTA b/l  Cardiovascular: S1 S2 RRR  GI: soft, NT, ND, + bowel sounds  Lymph/Hematologic: no palpable lymphadenopathy  Skin: no rashes, no lesions  Musculoskeletal: No edema, good tone, no deformities  Neurologic: oriented x 3, no focal deficits  Psychiatric: appropriate affect    Discharge Disposition   Discharged to home  Condition at discharge: Stable    Consultations This Hospital Stay   None    Time Spent on this Encounter   I, Nupur Epperson MD, MD,  personally saw the patient today and spent greater than 30 minutes discharging this patient.    Discharge Orders      Reason for your hospital stay    hyponatremia     Follow-up and recommended labs and tests     Follow up with primary care provider, Vandana Jones, within 7 days to evaluate medication change.  The following labs/tests are recommended: bmp, hgb.     Activity    Your activity upon discharge: activity as tolerated     Full Code     Diet    Follow this diet upon discharge: Orders Placed This Encounter      Combination Diet Regular Diet Adult     Discharge Medications   Current Discharge Medication List      START taking these medications    Details   cefdinir (OMNICEF) 300 MG capsule Take 1 capsule (300 mg) by mouth daily (with dinner) for 5 days  Qty: 5 capsule, Refills: 0    Associated Diagnoses: Acute cystitis without hematuria; Hyponatremia         CONTINUE these medications which have NOT CHANGED    Details   acetaminophen (TYLENOL) 500 MG tablet Take 2 tablet (1000mg) by oral route every 6 hours as needed  Qty: 100 tablet, Refills: 2    Associated Diagnoses: Other unknown and unspecified cause of morbidity or mortality      Ascorbic Acid (VITAMIN C) 500 MG CAPS Take 500 mg by mouth daily      calcium carbonate (OS-PEARL) 1500 (600 Ca) MG tablet Take 600 mg by mouth daily      isosorbide mononitrate (IMDUR) 30 MG 24 hr tablet TAKE ONE TABLET BY MOUTH AT BEDTIME.  Qty: 90 tablet, Refills: 3    Associated Diagnoses: Chronic ischemic heart disease      LORazepam (ATIVAN) 0.5 MG tablet TAKE 1 TABLET BY MOUTH EVERY 8 HOURS AS NEEDED FOR ANXIETY  Qty: 30 tablet, Refills: 0    Associated Diagnoses: Anxiety      Multiple Vitamins-Minerals (MULTIVITAMIN ADULT PO) Take 200 mg by mouth daily      pantoprazole (PROTONIX) 40 MG EC tablet Take 1 tablet (40 mg) by mouth 2 times daily  Qty: 60 tablet, Refills: 0    Associated Diagnoses: Acute GI bleeding      nitroGLYcerin (NITROSTAT) 0.4 MG sublingual tablet Place  1 tablet (0.4mg) by sublingual route at the first sign of attack; may repeat ever 5 min until relief; if pain persists after 3 tablets in 15 minutes prompt medical attention is recommended. AS NEEDED  Qty: 25 tablet, Refills: 3    Associated Diagnoses: Chronic ischemic heart disease, unspecified      STATIN NOT PRESCRIBED, INTENTIONAL, Statin not prescribed intentionally due to Other patient declines (This option does not exclude patient from measure)  Qty: 0 each, Refills: 0    Associated Diagnoses: Essential hypertension with goal blood pressure less than 140/90         STOP taking these medications       triamterene-HCTZ (DYAZIDE) 37.5-25 MG capsule Comments:   Reason for Stopping:             Allergies   Allergies   Allergen Reactions     [place] Juice Other (See Comments), Nausea and GI Disturbance     Vertigo     Food      Oranges.     Naprosyn [Naproxen]      Incontinence       Niacin Swelling     Data   Most Recent 3 CBC's:  Recent Labs   Lab Test 10/17/19  0542 10/16/19  0540 10/15/19  0510   WBC 5.4 5.3 3.7*   HGB 7.7* 8.6* 8.4*   MCV 85 82 83    255 267      Most Recent 3 BMP's:  Recent Labs   Lab Test 10/17/19  0542 10/16/19  1757 10/16/19  0540    128* 123*   POTASSIUM 4.2 4.1 4.0   CHLORIDE 104 97 92*   CO2 24 24 25   BUN 26 30 29   CR 0.92 1.08* 0.99   ANIONGAP 5 7 6   PEARL 8.1* 8.2* 8.6   GLC 89 118* 91     Most Recent 2 LFT's:  Recent Labs   Lab Test 10/14/19  1410 09/25/19  1052   AST 30 19   ALT 35 29   ALKPHOS 55 47   BILITOTAL 0.4 0.3     Most Recent INR's and Anticoagulation Dosing History:  Anticoagulation Dose History     Recent Dosing and Labs Latest Ref Rng & Units 10/23/2009 10/24/2009 10/25/2009 10/26/2009    INR 0.86 - 1.14 1.09 1.15(H) 1.37(H) 1.36(H)        Most Recent 3 Troponin's:  Recent Labs   Lab Test 09/25/19  1205 11/20/15  1650 05/05/15  1015   TROPI 0.029 0.019 <0.015  The 99th percentile for upper reference range is 0.045 ug/L.  Troponin values in   the range of  0.045 - 0.120 ug/L may be associated with risks of adverse   clinical events.       Most Recent Cholesterol Panel:  Recent Labs   Lab Test 12/05/16  1000   CHOL 227*   *   HDL 54   TRIG 271*     Most Recent 6 Bacteria Isolates From Any Culture (See EPIC Reports for Culture Details):  Recent Labs   Lab Test 10/14/19  1720 10/14/19  1418 10/10/19  1245 09/25/19  1633 10/29/18  1145 05/05/15  1411   CULT No MRSA isolated <10,000 colonies/mL  mixed urogenital neetu  No further identification or sensitivity done   >100,000 colonies/mL  Escherichia coli  * No MRSA isolated >100,000 colonies/mL  Klebsiella oxytoca  * No MRSA isolated     Most Recent TSH, T4 and A1c Labs:  Recent Labs   Lab Test 10/29/18  1140   TSH 1.84

## 2019-10-17 NOTE — PLAN OF CARE
VSS, denies pain. LS CTA bilat. BS normoactive. Urinary frequency and occasional stress incontinence. Ambulates SBA w/cane. PO appetite fair, remains on 1500mL fluid restriction. IVF continues, ABX PO Omnicef. Call light w/in reach, does not use appropriately. Family in to visit t/o shift.     Face to face report given with opportunity to observe patient.    Report given to Krista MANRIQUEZ RN.     Dano Pantoja RN   10/16/2019  7:25 PM

## 2019-10-17 NOTE — PLAN OF CARE
VSS, denies pain. LS CTA bilat. BS normoactive. Stress incontinence remains, voiding adequate urine.     Patient discharged at 12:47PM via wheel chair accompanied by daughter and staff. Prescriptions sent to patients preferred pharmacy. All belongings sent with patient.     Discharge instructions reviewed with Lauren dumont (liliana). Listed belongings gathered and returned to patient. Yes    Patient discharged to Home.   Report called to n/a    Core Measures and Vaccines  Core Measures applicable during stay: No. If yes, state diagnosis: n/a   Pneumonia and Influenza given prior to discharge, if indicated: No    Surgical Patient   Surgical Procedures during stay: none  Did patient receive discharge instruction on wound care and recognition of infection symptoms? N/A    MISC  Follow up appointment made:  Yes  Home and hospital aquired medications returned to patient: N/A  Patient reports pain was well managed at discharge: Yes

## 2019-10-17 NOTE — PROGRESS NOTES
Name: Lauren Barron    MRN#: [id number]    Reason for Hospitalization: Hyponatremia, Epigastric pain  Hyponatremia    Discharge Date: 10/17/2019    Patient / Family response to discharge plan: in agreement    Follow-Up Appt:   Future Appointments   Date Time Provider Department Center   10/24/2019  2:00 PM Vandana Jones MD [facility]   1/23/2020 10:45 AM Vandana Jones MD [facility]       Other Providers (Care Coordinator, County Services, PCA services etc): No, services declined.    Discharge Disposition: home    Holly Dan CM

## 2019-10-17 NOTE — DISCHARGE INSTRUCTIONS
You have been scheduled a follow up appointment with Dr. Jones on Thursday October 24th at 1:45pm. If this appointment time does not work for you please call the clinic to reschedule within 7 days of your hospital discharge.

## 2019-10-17 NOTE — TELEPHONE ENCOUNTER
11:33 AM    Reason for Call: OVERBOOK      The patient is requesting an appointment for Hosp Follow Up with Vandana Jones within 7-10 business days. PT is discharging today.    Was an appointment offered for this call? No  If yes : Appointment type              Date    Preferred method for responding to this message: Telephone Call  What is your phone number ?  EXT [phone] in ICU     If we cannot reach you directly, may we leave a detailed response at the number you provided?     Can this message wait until your PCP/provider returns, if unavailable today? Not applicable, PCP is in    [facility] Alize Rich

## 2019-10-17 NOTE — PLAN OF CARE
Raeann (daughter) called to inquire if we found keys left behind in patient room - room was cleaned and no keys discovered - Raeann was called back with information.

## 2019-10-23 NOTE — PROGRESS NOTES
Subjective     Lauren Barron is a 89 year old female who presents to clinic today for the following health issues:    HPI : Acute Hyponatremia/UTI    She was hospitalized for a sodium of 115. We had rechecked her urine prior to admit and her culture was negative. She is accompanied by her sister Arlen today.     Cold  She has a dry cough, no fever. She hasn't taken anything for symptoms      Hospital Course     Lauren Barron was admitted on 10/14/2019.  She is a pleasant 89-year-old female with history of chronic kidney disease stage III, pulmonary fibrosis, diastolic dysfunction as well as hypertension who presents with acute hyponatremia.  She seems to be chronically hyponatremic around high 120s to low 130s.  She recently was treated with a urinary infection, and she admits to increased water intake over the last several days as well.  In addition she is also on Dyazide.  She has some mild confusion, but was other wise a symptomatic.  She was treated with fluid restriction and gentle IV fluids normal saline.  She was corrected slowly, and discharge sodium level was 133.  She appears back to baseline at this time, and is wanting to go home.  Her blood pressures have been good here, therefore we will discontinue her Dyazide for the time being.  We will finish treating her urinary infection with Omnicef p.o. for 5 more days, will have been 12 days of total treatment from her diagnosis on 10/10.  She does have anemia as well, likely related to her chronic kidney disease.  She has no evidence of clinical blood loss, and her hemoglobin dropped from 8.7-7.7.  This is mostly dilutional, and she is asymptomatic.  We will hold off on transfusion currently and have her follow-up with her primary care physician within 7 days to get a repeat hemoglobin level as well as a repeat basic metabolic panel to assess her sodium off of her Dyazide.  She is currently stable to discharge home.  Of note, she does live alone  however she is moving into the [place] starting in November.  She does have strong family support in the meantime.    Feeling better today: just a little cold      Nupur Epperson MD     Patient Active Problem List   Diagnosis     Epistaxis, recurrent     Advance Care Planning     Hyperlipidemia     Essential hypertension     Transient global amnesia     GI bleed     ASCVD (arteriosclerotic cardiovascular disease)     Squamous cell skin cancer, ala nasi     Allergic rhinitis     Meniere's disease     Diastolic dysfunction     Atherosclerosis of aorta (H)     Cystocele     Mammogram declined     Bradycardia     Aortic insufficiency     Mitral regurgitation     Osteoarthritis     Sick sinus syndrome (H)     Acute posthemorrhagic anemia     Coronary arteriosclerosis in native artery     Epistaxis     Gastrointestinal hemorrhage     Multiple-type hyperlipidemia     Presence of cardiac pacemaker     Thrombocytopenia (H)     Hiatal hernia     Splenic artery aneurysm (H)     Pulmonary fibrosis (H)     Benign essential hypertension     Pulmonary nodules     CKD (chronic kidney disease) stage 3, GFR 30-59 ml/min (H)     Other emphysema (H)     Acute GI bleeding     Duodenal ulcer     Acute hyponatremia     Hyponatremia     Past Surgical History:   Procedure Laterality Date     APPENDECTOMY       ARTHROSCOPY KNEE      RIGHT - Osteoarthritis     C TOTAL KNEE ARTHROPLASTY      LEFT - Osteoarthritis - Ram[facility]rez Ruvalcaba     C TOTAL KNEE ARTHROPLASTY  2009    RIGHT - Osteoarthritis - [facility] Ruvalcaba     CATARACT EXTRACTION and LENS IMPLANTATION  2010    BILATERAL     COLONOSCOPY  2012     Colonoscopy with Polypectomy  2012    GI BLEED - DR. DEL CASTILLO     ESOPHAGOSCOPY, GASTROSCOPY, DUODENOSCOPY (EGD), COMBINED N/A 2019    Procedure: UPPER ENDOSCOPY WITH BIOPSY;  Surgeon: Jorge Freeman MD;  Location: [facility] OR     [address] REMOVAL OF OVARIAN CYST(S)       HYSTERECTOMY, YANG      Metorrhagia     IMPLANT  PACEMAKER  03/28/2016     Left Subclavian Line, Triple Lumen  2012    Gastric Ulcer, Dieulfoy lesion, hemostatic clips placed - Massive GI Bleed - Transferred to [facility]     RELEASE CARPAL TUNNEL      RIGHT - Carpal Tunnel Syndrome     REPAIR BLADDER       SIGMOIDOSCOPY FLEXIBLE N/A 9/26/2019    Procedure: FLEXIBLE SIGMOIDOSCOPY;  Surgeon: Jorge Freeman MD;  Location: [facility] OR       Social History     Tobacco Use     Smoking status: Never Smoker     Smokeless tobacco: Never Used   Substance Use Topics     Alcohol use: No     Alcohol/week: 0.0 standard drinks     Family History   Problem Relation Age of Onset     Cancer Brother         Pancreatic     Cancer Brother         Prostate     Cancer Sister         Breast     Hypertension Brother      Hypertension Father      Hypertension Sister      Hypertension Mother      Aneurysm Daughter 68        cerebral, sudden death     Osteoporosis Other      Thyroid Disease No family hx of      Diabetes No family hx of          Current Outpatient Medications   Medication Sig Dispense Refill     Ascorbic Acid (VITAMIN C) 500 MG CAPS Take 500 mg by mouth daily       calcium carbonate (OS-PEARL) 1500 (600 Ca) MG tablet Take 600 mg by mouth daily       isosorbide mononitrate (IMDUR) 30 MG 24 hr tablet TAKE ONE TABLET BY MOUTH AT BEDTIME. 90 tablet 3     LORazepam (ATIVAN) 0.5 MG tablet TAKE 1 TABLET BY MOUTH EVERY 8 HOURS AS NEEDED FOR ANXIETY 30 tablet 0     Multiple Vitamins-Minerals (MULTIVITAMIN ADULT PO) Take 200 mg by mouth daily       pantoprazole (PROTONIX) 40 MG EC tablet Take 1 tablet (40 mg) by mouth 2 times daily 60 tablet 0     STATIN NOT PRESCRIBED, INTENTIONAL, Statin not prescribed intentionally due to Other patient declines (This option does not exclude patient from measure) 0 each 0     acetaminophen (TYLENOL) 500 MG tablet Take 2 tablet (1000mg) by oral route every 6 hours as needed (Patient not taking: Reported on 10/24/2019) 100 tablet 2     nitroGLYcerin  (NITROSTAT) 0.4 MG sublingual tablet Place 1 tablet (0.4mg) by sublingual route at the first sign of attack; may repeat ever 5 min until relief; if pain persists after 3 tablets in 15 minutes prompt medical attention is recommended. AS NEEDED (Patient not taking: Reported on 10/24/2019) 25 tablet 3     Allergies   Allergen Reactions     [place] Juice Other (See Comments), Nausea and GI Disturbance     Vertigo     Food      Oranges.     Naprosyn [Naproxen]      Incontinence       Niacin Swelling     BP Readings from Last 3 Encounters:   10/24/19 117/61   10/17/19 137/52   10/14/19 138/68    Wt Readings from Last 3 Encounters:   10/24/19 53.1 kg (117 lb)   10/16/19 50.2 kg (110 lb 10.7 oz)   10/14/19 50.8 kg (112 lb)                      Reviewed and updated as needed this visit by Provider         Review of Systems   ROS COMP: Constitutional, HEENT, cardiovascular, pulmonary, gi and gu systems are negative, except as otherwise noted.      Objective    Pulse 71   Temp 97.4  F (36.3  C) (Tympanic)   Wt 53.1 kg (117 lb)   SpO2 98%   BMI 21.40 kg/m    Body mass index is 21.4 kg/m .  Physical Exam   GENERAL APPEARANCE: healthy, alert and no distress  EYES: Eyes grossly normal to inspection, PERRL and conjunctivae and sclerae normal  HENT: ear canals and TM's normal and nose and mouth without ulcers or lesions  NECK: no adenopathy, no asymmetry, masses, or scars and thyroid normal to palpation  RESP: lungs clear to auscultation - no rales, rhonchi or wheezes  CV: regular rates and rhythm, normal S1 S2, no S3 or S4 and grade 2/6 DIVYA murmur heard best over the :LSB  LYMPHATICS: no cervical adenopathy  ABDOMEN: soft, nontender, without hepatosplenomegaly or masses and bowel sounds normal  MS: no edema  SKIN: no suspicious lesions or rashes  NEURO: Normal strength and tone, mentation intact and speech normal  PSYCH: mentation appears normal and worried            Assessment & Plan     1. Hyponatremia  Will recheck labs  today  - Comprehensive metabolic panel    2. Gastrointestinal hemorrhage associated with duodenal ulcer  Due for hemoglobin recheck. She was not transfused.   - CBC with platelets and differential           Return in about 4 weeks (around 11/21/2019) for hyponatremia.    Vandana Jones MD  [facility] - [place]

## 2019-10-24 ENCOUNTER — OFFICE VISIT (OUTPATIENT)
Dept: FAMILY MEDICINE | Facility: OTHER | Age: 84
End: 2019-10-24
Attending: FAMILY MEDICINE
Payer: COMMERCIAL

## 2019-10-24 VITALS
BODY MASS INDEX: 21.4 KG/M2 | WEIGHT: 117 LBS | SYSTOLIC BLOOD PRESSURE: 117 MMHG | DIASTOLIC BLOOD PRESSURE: 61 MMHG | OXYGEN SATURATION: 98 % | HEART RATE: 71 BPM | TEMPERATURE: 97.4 F

## 2019-10-24 DIAGNOSIS — K26.4 GASTROINTESTINAL HEMORRHAGE ASSOCIATED WITH DUODENAL ULCER: ICD-10-CM

## 2019-10-24 DIAGNOSIS — E87.1 HYPONATREMIA: Primary | ICD-10-CM

## 2019-10-24 LAB
ALBUMIN SERPL-MCNC: 2.8 G/DL (ref 3.4–5)
ALP SERPL-CCNC: 118 U/L (ref 40–150)
ALT SERPL W P-5'-P-CCNC: 84 U/L (ref 0–50)
ANION GAP SERPL CALCULATED.3IONS-SCNC: 6 MMOL/L (ref 3–14)
AST SERPL W P-5'-P-CCNC: 45 U/L (ref 0–45)
BASOPHILS # BLD AUTO: 0 10E9/L (ref 0–0.2)
BASOPHILS NFR BLD AUTO: 0.6 %
BILIRUB SERPL-MCNC: 0.3 MG/DL (ref 0.2–1.3)
BUN SERPL-MCNC: 38 MG/DL (ref 7–30)
CALCIUM SERPL-MCNC: 8.4 MG/DL (ref 8.5–10.1)
CHLORIDE SERPL-SCNC: 98 MMOL/L (ref 94–109)
CO2 SERPL-SCNC: 26 MMOL/L (ref 20–32)
CREAT SERPL-MCNC: 0.9 MG/DL (ref 0.52–1.04)
DIFFERENTIAL METHOD BLD: ABNORMAL
EOSINOPHIL # BLD AUTO: 0.1 10E9/L (ref 0–0.7)
EOSINOPHIL NFR BLD AUTO: 1.5 %
ERYTHROCYTE [DISTWIDTH] IN BLOOD BY AUTOMATED COUNT: 13.2 % (ref 10–15)
GFR SERPL CREATININE-BSD FRML MDRD: 55 ML/MIN/{1.73_M2}
GLUCOSE SERPL-MCNC: 100 MG/DL (ref 70–99)
HCT VFR BLD AUTO: 23.7 % (ref 35–47)
HGB BLD-MCNC: 7.9 G/DL (ref 11.7–15.7)
IMM GRANULOCYTES # BLD: 0 10E9/L (ref 0–0.4)
IMM GRANULOCYTES NFR BLD: 0.3 %
LYMPHOCYTES # BLD AUTO: 1.1 10E9/L (ref 0.8–5.3)
LYMPHOCYTES NFR BLD AUTO: 15.1 %
MCH RBC QN AUTO: 28.2 PG (ref 26.5–33)
MCHC RBC AUTO-ENTMCNC: 33.3 G/DL (ref 31.5–36.5)
MCV RBC AUTO: 85 FL (ref 78–100)
MONOCYTES # BLD AUTO: 0.7 10E9/L (ref 0–1.3)
MONOCYTES NFR BLD AUTO: 9.8 %
NEUTROPHILS # BLD AUTO: 5.3 10E9/L (ref 1.6–8.3)
NEUTROPHILS NFR BLD AUTO: 72.7 %
NRBC # BLD AUTO: 0 10*3/UL
NRBC BLD AUTO-RTO: 0 /100
PLATELET # BLD AUTO: 356 10E9/L (ref 150–450)
POTASSIUM SERPL-SCNC: 4.2 MMOL/L (ref 3.4–5.3)
PROT SERPL-MCNC: 6.6 G/DL (ref 6.8–8.8)
RBC # BLD AUTO: 2.8 10E12/L (ref 3.8–5.2)
SODIUM SERPL-SCNC: 130 MMOL/L (ref 133–144)
WBC # BLD AUTO: 7.2 10E9/L (ref 4–11)

## 2019-10-24 PROCEDURE — 85025 COMPLETE CBC W/AUTO DIFF WBC: CPT | Mod: ZL | Performed by: FAMILY MEDICINE

## 2019-10-24 PROCEDURE — 80053 COMPREHEN METABOLIC PANEL: CPT | Mod: ZL | Performed by: FAMILY MEDICINE

## 2019-10-24 PROCEDURE — 99213 OFFICE O/P EST LOW 20 MIN: CPT | Performed by: FAMILY MEDICINE

## 2019-10-24 PROCEDURE — G0463 HOSPITAL OUTPT CLINIC VISIT: HCPCS

## 2019-10-24 PROCEDURE — 36415 COLL VENOUS BLD VENIPUNCTURE: CPT | Mod: ZL | Performed by: FAMILY MEDICINE

## 2019-10-24 ASSESSMENT — PAIN SCALES - GENERAL: PAINLEVEL: NO PAIN (0)

## 2019-10-24 NOTE — NURSING NOTE
"Chief Complaint   Patient presents with     Hospital F/U       Initial Pulse 71   Temp 97.4  F (36.3  C) (Tympanic)   Wt 53.1 kg (117 lb)   SpO2 98%   BMI 21.40 kg/m   Estimated body mass index is 21.4 kg/m  as calculated from the following:    Height as of 10/14/19: 1.575 m (5' 2\").    Weight as of this encounter: 53.1 kg (117 lb).  Medication Reconciliation: complete  Gabriella Sweet LPN  "

## 2019-10-28 DIAGNOSIS — F41.9 ANXIETY: Primary | ICD-10-CM

## 2019-10-29 RX ORDER — LORAZEPAM 0.5 MG/1
TABLET ORAL
Qty: 30 TABLET | Refills: 0 | Status: SHIPPED | OUTPATIENT
Start: 2019-10-29 | End: 2019-11-25

## 2019-10-29 NOTE — TELEPHONE ENCOUNTER
Ativan      Last Written Prescription Date:  9.30.19  Last Fill Quantity: #30,   # refills: 0  Last Office Visit: 10.24.19  Future Office visit:    Next 5 appointments (look out 90 days)    Jan 23, 2020 10:45 AM CST  (Arrive by 10:30 AM)  SHORT with Vandana Jones MD  [facility] [address]  [place] [address]  [phone]           Routing refill request to provider for review/approval because:  Drug not on the [facility], P or  Health refill protocol or controlled substance

## 2019-11-05 DIAGNOSIS — K92.2 ACUTE GI BLEEDING: ICD-10-CM

## 2019-11-05 NOTE — TELEPHONE ENCOUNTER
pantoprazole (PROTONIX) 40 MG EC tablet  Last Written Prescription Date:  9-27-19  Last Fill Quantity: 60,   # refills: 0  Last Office Visit: 10/24/19  Future Office visit:    Next 5 appointments (look out 90 days)    Nov 25, 2019  2:15 PM CST  (Arrive by 2:00 PM)  SHORT with Vandana Jones MD  [facility] ([facility] ) [address]  [place] [address]  [phone]   Jan 23, 2020 10:45 AM CST  (Arrive by 10:30 AM)  SHORT with Vandana Jones MD  [facility] ([facility] ) [address]  [place] [address]  [phone]

## 2019-11-08 RX ORDER — PANTOPRAZOLE SODIUM 40 MG/1
40 TABLET, DELAYED RELEASE ORAL 2 TIMES DAILY
Qty: 60 TABLET | Refills: 0 | Status: SHIPPED | OUTPATIENT
Start: 2019-11-08 | End: 2019-11-25

## 2019-11-18 NOTE — PROGRESS NOTES
Subjective     Lauren Barron is a 89 year old female who presents to clinic today accompanied by her 2 daughtersfor the following health issues:    HPI   Hyponatremia       Duration: 4 week follow up    Description (location/character/radiation): follow up    Intensity:  0/10    Accompanying signs and symptoms: none    History (similar episodes/previous evaluation): None    Precipitating or alleviating factors: None    Therapies tried and outcome: None   She was hospitalized for severe hyponatremia and her Dyazide  was stopped. She is feeling well and is settling into the [place] now    Acute GI bleed  With anemia. She has been taking Protonix bid for acute anemia due to acute GI bleeding. She isn't having any symptoms at this time.           Patient Active Problem List   Diagnosis     Epistaxis, recurrent     Advance Care Planning     Hyperlipidemia     Essential hypertension     Transient global amnesia     GI bleed     ASCVD (arteriosclerotic cardiovascular disease)     Squamous cell skin cancer, ala nasi     Allergic rhinitis     Meniere's disease     Diastolic dysfunction     Atherosclerosis of aorta (H)     Cystocele     Mammogram declined     Bradycardia     Aortic insufficiency     Mitral regurgitation     Osteoarthritis     Sick sinus syndrome (H)     Acute posthemorrhagic anemia     Coronary arteriosclerosis in native artery     Epistaxis     Gastrointestinal hemorrhage     Multiple-type hyperlipidemia     Presence of cardiac pacemaker     Thrombocytopenia (H)     Hiatal hernia     Splenic artery aneurysm (H)     Pulmonary fibrosis (H)     Benign essential hypertension     Pulmonary nodules     CKD (chronic kidney disease) stage 3, GFR 30-59 ml/min (H)     Other emphysema (H)     Acute GI bleeding     Duodenal ulcer     Acute hyponatremia     Hyponatremia     Past Surgical History:   Procedure Laterality Date     APPENDECTOMY       ARTHROSCOPY KNEE      RIGHT - Osteoarthritis     C TOTAL KNEE  ARTHROPLASTY  2007    LEFT - Osteoarthritis - [facility] Ruvalcaba TOTAL KNEE ARTHROPLASTY      RIGHT - Osteoarthritis - [facility] Ruvalcaba     CATARACT EXTRACTION and LENS IMPLANTATION      BILATERAL     COLONOSCOPY       Colonoscopy with Polypectomy      GI BLEED - DR. DEL CASTILLO     ESOPHAGOSCOPY, GASTROSCOPY, DUODENOSCOPY (EGD), COMBINED N/A 2019    Procedure: UPPER ENDOSCOPY WITH BIOPSY;  Surgeon: Jorge Freeman MD;  Location: [facility] OR            [facility] REMOVAL OF OVARIAN CYST(S)       HYSTERECTOMY, YANG      Metorrhagia     IMPLANT PACEMAKER  2016     Left Subclavian Line, Triple Lumen      Gastric Ulcer, Dieulfoy lesion, hemostatic clips placed - Massive GI Bleed - Transferred to [facility]     RELEASE CARPAL TUNNEL      RIGHT - Carpal Tunnel Syndrome     REPAIR BLADDER       SIGMOIDOSCOPY FLEXIBLE N/A 2019    Procedure: FLEXIBLE SIGMOIDOSCOPY;  Surgeon: Jorge Freeman MD;  Location: [facility]       Social History     Tobacco Use     Smoking status: Never Smoker     Smokeless tobacco: Never Used   Substance Use Topics     Alcohol use: No     Alcohol/week: 0.0 standard drinks     Family History   Problem Relation Age of Onset     Cancer Brother         Pancreatic     Cancer Brother         Prostate     Cancer Sister         Breast     Hypertension Brother      Hypertension Father      Hypertension Sister      Hypertension Mother      Aneurysm Daughter 68        cerebral, sudden death     Osteoporosis Other      Thyroid Disease No family hx of      Diabetes No family hx of          Current Outpatient Medications   Medication Sig Dispense Refill     acetaminophen (TYLENOL) 500 MG tablet Take 2 tablet (1000mg) by oral route every 6 hours as needed 100 tablet 2     Ascorbic Acid (VITAMIN C) 500 MG CAPS Take 500 mg by mouth daily       calcium carbonate (OS-PEARL) 1500 (600 Ca) MG tablet Take 600 mg by mouth daily       isosorbide mononitrate (IMDUR) 30 MG 24 hr tablet TAKE ONE  "TABLET BY MOUTH AT BEDTIME. 90 tablet 3     LORazepam (ATIVAN) 0.5 MG tablet TAKE 1 TABLET BY MOUTH EVERY 8 HOURS AS NEEDED FOR ANXIETY 30 tablet 0     Multiple Vitamins-Minerals (MULTIVITAMIN ADULT PO) Take 200 mg by mouth daily       nitroGLYcerin (NITROSTAT) 0.4 MG sublingual tablet Place 1 tablet (0.4mg) by sublingual route at the first sign of attack; may repeat ever 5 min until relief; if pain persists after 3 tablets in 15 minutes prompt medical attention is recommended. AS NEEDED 25 tablet 3     pantoprazole (PROTONIX) 40 MG EC tablet Take 1 tablet (40 mg) by mouth daily 60 tablet 0     STATIN NOT PRESCRIBED, INTENTIONAL, Statin not prescribed intentionally due to Other patient declines (This option does not exclude patient from measure) 0 each 0     Allergies   Allergen Reactions     [place] Juice Other (See Comments), Nausea and GI Disturbance     Vertigo     Food      Oranges.     Naprosyn [Naproxen]      Incontinence       Niacin Swelling     BP Readings from Last 3 Encounters:   11/25/19 135/62   10/24/19 117/61   10/17/19 137/52    Wt Readings from Last 3 Encounters:   11/25/19 51.7 kg (114 lb)   10/24/19 53.1 kg (117 lb)   10/16/19 50.2 kg (110 lb 10.7 oz)                      Reviewed and updated as needed this visit by Provider  Allergies  Meds         Review of Systems   ROS COMP: Constitutional, HEENT, cardiovascular, pulmonary, gi and gu systems are negative, except as otherwise noted.      Objective    /62   Pulse 62   Resp 20   Ht 1.575 m (5' 2\")   Wt 51.7 kg (114 lb)   SpO2 98%   BMI 20.85 kg/m    Body mass index is 20.85 kg/m .  Physical Exam   GENERAL: healthy, alert and no distress  NECK: no adenopathy, no asymmetry, masses, or scars and thyroid normal to palpation  RESP: lungs clear to auscultation - no rales, rhonchi or wheezes  CV: regular rate and rhythm, normal S1 S2, no S3 or S4, no murmur, click or rub, no peripheral edema and peripheral pulses strong  MS: no gross " musculoskeletal defects noted, no edema  NEURO: Normal strength and tone, mentation intact and speech normal  PSYCH: mentation appears normal, affect normal/bright            Assessment & Plan     1. Hyponatremia  Recheck labs today.   - Basic metabolic panel    2. Acute GI bleeding  Decrease this down to once daily. If she remains asymptomatic she is advised she can stop this  - pantoprazole (PROTONIX) 40 MG EC tablet; Take 1 tablet (40 mg) by mouth daily  Dispense: 60 tablet; Refill: 0    3. Other iron deficiency anemia  Check lab today  - CBC with platelets           Return in about 6 months (around 5/25/2020) for BP Recheck.    Vandana Jones MD  [facility] - [place]

## 2019-11-25 ENCOUNTER — OFFICE VISIT (OUTPATIENT)
Dept: FAMILY MEDICINE | Facility: OTHER | Age: 84
End: 2019-11-25
Attending: FAMILY MEDICINE
Payer: COMMERCIAL

## 2019-11-25 VITALS
OXYGEN SATURATION: 98 % | DIASTOLIC BLOOD PRESSURE: 62 MMHG | BODY MASS INDEX: 20.98 KG/M2 | RESPIRATION RATE: 20 BRPM | HEART RATE: 62 BPM | SYSTOLIC BLOOD PRESSURE: 135 MMHG | HEIGHT: 62 IN | WEIGHT: 114 LBS

## 2019-11-25 DIAGNOSIS — D50.8 OTHER IRON DEFICIENCY ANEMIA: ICD-10-CM

## 2019-11-25 DIAGNOSIS — K92.2 ACUTE GI BLEEDING: ICD-10-CM

## 2019-11-25 DIAGNOSIS — D64.9 ANEMIA, UNSPECIFIED TYPE: ICD-10-CM

## 2019-11-25 DIAGNOSIS — E87.1 HYPONATREMIA: Primary | ICD-10-CM

## 2019-11-25 LAB
ANION GAP SERPL CALCULATED.3IONS-SCNC: 6 MMOL/L (ref 3–14)
BUN SERPL-MCNC: 29 MG/DL (ref 7–30)
CALCIUM SERPL-MCNC: 8.9 MG/DL (ref 8.5–10.1)
CHLORIDE SERPL-SCNC: 106 MMOL/L (ref 94–109)
CO2 SERPL-SCNC: 26 MMOL/L (ref 20–32)
CREAT SERPL-MCNC: 0.95 MG/DL (ref 0.52–1.04)
GFR SERPL CREATININE-BSD FRML MDRD: 52 ML/MIN/{1.73_M2}
GLUCOSE SERPL-MCNC: 90 MG/DL (ref 70–99)
POTASSIUM SERPL-SCNC: 4.3 MMOL/L (ref 3.4–5.3)
SODIUM SERPL-SCNC: 138 MMOL/L (ref 133–144)

## 2019-11-25 PROCEDURE — 36415 COLL VENOUS BLD VENIPUNCTURE: CPT | Mod: ZL | Performed by: FAMILY MEDICINE

## 2019-11-25 PROCEDURE — 80048 BASIC METABOLIC PNL TOTAL CA: CPT | Mod: ZL | Performed by: FAMILY MEDICINE

## 2019-11-25 PROCEDURE — G0463 HOSPITAL OUTPT CLINIC VISIT: HCPCS

## 2019-11-25 PROCEDURE — 99213 OFFICE O/P EST LOW 20 MIN: CPT | Performed by: FAMILY MEDICINE

## 2019-11-25 RX ORDER — PANTOPRAZOLE SODIUM 40 MG/1
40 TABLET, DELAYED RELEASE ORAL DAILY
Qty: 60 TABLET | Refills: 0 | COMMUNITY
Start: 2019-11-25 | End: 2020-02-19

## 2019-11-25 ASSESSMENT — PAIN SCALES - GENERAL: PAINLEVEL: NO PAIN (0)

## 2019-11-25 ASSESSMENT — MIFFLIN-ST. JEOR: SCORE: 875.35

## 2019-11-25 NOTE — NURSING NOTE
"Chief Complaint   Patient presents with     RECHECK       Initial BP (!) 144/78   Pulse 62   Resp 20   Ht 1.575 m (5' 2\")   Wt 51.7 kg (114 lb)   SpO2 98%   BMI 20.85 kg/m   Estimated body mass index is 20.85 kg/m  as calculated from the following:    Height as of this encounter: 1.575 m (5' 2\").    Weight as of this encounter: 51.7 kg (114 lb).  Medication Reconciliation: complete  Lucero Mckeon LPN    "

## 2019-12-09 DIAGNOSIS — F41.9 ANXIETY: ICD-10-CM

## 2019-12-09 DIAGNOSIS — K92.2 ACUTE GI BLEEDING: ICD-10-CM

## 2019-12-10 RX ORDER — PANTOPRAZOLE SODIUM 40 MG/1
TABLET, DELAYED RELEASE ORAL
Qty: 60 TABLET | Refills: 0 | Status: SHIPPED | OUTPATIENT
Start: 2019-12-10 | End: 2020-01-03

## 2019-12-10 RX ORDER — LORAZEPAM 0.5 MG/1
TABLET ORAL
Qty: 30 TABLET | Refills: 0 | Status: SHIPPED | OUTPATIENT
Start: 2019-12-10 | End: 2020-01-06

## 2019-12-10 NOTE — TELEPHONE ENCOUNTER
lorazepam      Last Written Prescription Date:  9/29/19  Last Fill Quantity: 30,   # refills: 0  Last Office Visit: 11/25/19  Future Office visit:       Routing refill request to provider for review/approval because:  Drug not on the [facility], P or [facility] refill protocol or controlled substance  protonix 11/25/19 #60

## 2019-12-30 ENCOUNTER — TELEPHONE (OUTPATIENT)
Dept: FAMILY MEDICINE | Facility: OTHER | Age: 84
End: 2019-12-30

## 2019-12-30 NOTE — TELEPHONE ENCOUNTER
Patient is wondering if she could take prevagen OTC, states she is getting forgetful.    [phone]  Yesica

## 2020-01-01 ENCOUNTER — OFFICE VISIT (OUTPATIENT)
Dept: FAMILY MEDICINE | Facility: OTHER | Age: 85
End: 2020-01-01
Attending: FAMILY MEDICINE
Payer: COMMERCIAL

## 2020-01-01 ENCOUNTER — TELEPHONE (OUTPATIENT)
Dept: FAMILY MEDICINE | Facility: OTHER | Age: 85
End: 2020-01-01

## 2020-01-01 ENCOUNTER — HOSPITAL ENCOUNTER (EMERGENCY)
Facility: HOSPITAL | Age: 85
Discharge: HOME | End: 2020-11-10
Attending: NURSE PRACTITIONER | Admitting: NURSE PRACTITIONER
Payer: COMMERCIAL

## 2020-01-01 ENCOUNTER — HOSPITAL ENCOUNTER (OUTPATIENT)
Facility: HOSPITAL | Age: 85
Setting detail: OBSERVATION
Discharge: HOME | End: 2020-11-13
Attending: INTERNAL MEDICINE | Admitting: INTERNAL MEDICINE
Payer: COMMERCIAL

## 2020-01-01 ENCOUNTER — TRANSFERRED RECORDS (OUTPATIENT)
Dept: HEALTH INFORMATION MANAGEMENT | Facility: CLINIC | Age: 85
End: 2020-01-01

## 2020-01-01 ENCOUNTER — NURSE TRIAGE (OUTPATIENT)
Dept: NURSING | Facility: CLINIC | Age: 85
End: 2020-01-01

## 2020-01-01 ENCOUNTER — ANCILLARY PROCEDURE (OUTPATIENT)
Dept: GENERAL RADIOLOGY | Facility: OTHER | Age: 85
End: 2020-01-01
Attending: FAMILY MEDICINE
Payer: COMMERCIAL

## 2020-01-01 ENCOUNTER — NURSE TRIAGE (OUTPATIENT)
Dept: FAMILY MEDICINE | Facility: OTHER | Age: 85
End: 2020-01-01

## 2020-01-01 ENCOUNTER — APPOINTMENT (OUTPATIENT)
Dept: OCCUPATIONAL THERAPY | Facility: HOSPITAL | Age: 85
End: 2020-01-01
Attending: INTERNAL MEDICINE
Payer: COMMERCIAL

## 2020-01-01 ENCOUNTER — APPOINTMENT (OUTPATIENT)
Dept: LAB | Facility: OTHER | Age: 85
End: 2020-01-01
Attending: FAMILY MEDICINE
Payer: COMMERCIAL

## 2020-01-01 VITALS
BODY MASS INDEX: 22.14 KG/M2 | SYSTOLIC BLOOD PRESSURE: 132 MMHG | OXYGEN SATURATION: 99 % | WEIGHT: 125 LBS | RESPIRATION RATE: 20 BRPM | DIASTOLIC BLOOD PRESSURE: 62 MMHG | HEART RATE: 60 BPM | TEMPERATURE: 96 F

## 2020-01-01 VITALS
HEART RATE: 60 BPM | SYSTOLIC BLOOD PRESSURE: 132 MMHG | WEIGHT: 122.8 LBS | DIASTOLIC BLOOD PRESSURE: 76 MMHG | TEMPERATURE: 96.3 F | BODY MASS INDEX: 25.67 KG/M2 | OXYGEN SATURATION: 98 %

## 2020-01-01 VITALS
WEIGHT: 119.69 LBS | HEART RATE: 65 BPM | BODY MASS INDEX: 21.21 KG/M2 | DIASTOLIC BLOOD PRESSURE: 54 MMHG | TEMPERATURE: 96.2 F | RESPIRATION RATE: 17 BRPM | OXYGEN SATURATION: 98 % | SYSTOLIC BLOOD PRESSURE: 150 MMHG | HEIGHT: 63 IN

## 2020-01-01 VITALS
OXYGEN SATURATION: 99 % | TEMPERATURE: 98.1 F | BODY MASS INDEX: 21.21 KG/M2 | DIASTOLIC BLOOD PRESSURE: 61 MMHG | SYSTOLIC BLOOD PRESSURE: 159 MMHG | HEART RATE: 65 BPM | RESPIRATION RATE: 18 BRPM | HEIGHT: 63 IN | WEIGHT: 119.71 LBS

## 2020-01-01 VITALS
OXYGEN SATURATION: 99 % | HEART RATE: 60 BPM | RESPIRATION RATE: 18 BRPM | TEMPERATURE: 97.4 F | DIASTOLIC BLOOD PRESSURE: 74 MMHG | SYSTOLIC BLOOD PRESSURE: 179 MMHG

## 2020-01-01 DIAGNOSIS — R41.0 CONFUSION: ICD-10-CM

## 2020-01-01 DIAGNOSIS — E87.1 HYPONATREMIA: ICD-10-CM

## 2020-01-01 DIAGNOSIS — K26.9 DUODENAL ULCER: ICD-10-CM

## 2020-01-01 DIAGNOSIS — F41.9 ANXIETY: ICD-10-CM

## 2020-01-01 DIAGNOSIS — I50.42 CHRONIC COMBINED SYSTOLIC AND DIASTOLIC CONGESTIVE HEART FAILURE (H): ICD-10-CM

## 2020-01-01 DIAGNOSIS — N17.9 AKI (ACUTE KIDNEY INJURY) (H): ICD-10-CM

## 2020-01-01 DIAGNOSIS — N17.9 ACUTE KIDNEY INJURY (H): ICD-10-CM

## 2020-01-01 DIAGNOSIS — N30.00 ACUTE CYSTITIS WITHOUT HEMATURIA: Primary | ICD-10-CM

## 2020-01-01 DIAGNOSIS — R35.0 URINARY FREQUENCY: ICD-10-CM

## 2020-01-01 DIAGNOSIS — I10 ESSENTIAL HYPERTENSION: ICD-10-CM

## 2020-01-01 DIAGNOSIS — R30.0 DYSURIA: ICD-10-CM

## 2020-01-01 DIAGNOSIS — N30.00 ACUTE CYSTITIS WITHOUT HEMATURIA: ICD-10-CM

## 2020-01-01 DIAGNOSIS — R30.0 DYSURIA: Primary | ICD-10-CM

## 2020-01-01 DIAGNOSIS — R82.90 ABNORMAL URINE FINDINGS: Primary | ICD-10-CM

## 2020-01-01 DIAGNOSIS — R35.0 URINARY FREQUENCY: Primary | ICD-10-CM

## 2020-01-01 DIAGNOSIS — E87.1 HYPONATREMIA: Primary | ICD-10-CM

## 2020-01-01 LAB
ALBUMIN SERPL-MCNC: 3.5 G/DL (ref 3.4–5)
ALBUMIN UR-MCNC: 10 MG/DL
ALBUMIN UR-MCNC: 10 MG/DL
ALBUMIN UR-MCNC: NEGATIVE MG/DL
ALP SERPL-CCNC: 103 U/L (ref 40–150)
ALT SERPL W P-5'-P-CCNC: 73 U/L (ref 0–50)
ALT SERPL-CCNC: 41 U/L (ref 18–65)
ALT SERPL-CCNC: 88 U/L (ref 18–65)
ANION GAP SERPL CALCULATED.3IONS-SCNC: 7 MMOL/L (ref 3–14)
ANION GAP SERPL CALCULATED.3IONS-SCNC: 9 MMOL/L (ref 3–14)
ANION GAP SERPL CALCULATED.3IONS-SCNC: 9 MMOL/L (ref 3–14)
APPEARANCE UR: CLEAR
AST SERPL W P-5'-P-CCNC: 33 U/L (ref 0–45)
AST SERPL-CCNC: 32 U/L (ref 10–30)
AST SERPL-CCNC: 88 U/L (ref 10–30)
BACTERIA #/AREA URNS HPF: ABNORMAL /HPF
BACTERIA SPEC CULT: ABNORMAL
BACTERIA SPEC CULT: ABNORMAL
BASOPHILS # BLD AUTO: 0 10E9/L (ref 0–0.2)
BASOPHILS NFR BLD AUTO: 0.4 %
BILIRUB SERPL-MCNC: 0.3 MG/DL (ref 0.2–1.3)
BILIRUB UR QL STRIP: NEGATIVE
BUN SERPL-MCNC: 20 MG/DL (ref 7–30)
BUN SERPL-MCNC: 23 MG/DL (ref 7–30)
BUN SERPL-MCNC: 25 MG/DL (ref 7–30)
BUN SERPL-MCNC: 27 MG/DL (ref 7–30)
BUN SERPL-MCNC: 27 MG/DL (ref 7–30)
BUN SERPL-MCNC: 28 MG/DL (ref 7–30)
CALCIUM SERPL-MCNC: 8.2 MG/DL (ref 8.5–10.1)
CALCIUM SERPL-MCNC: 8.5 MG/DL (ref 8.5–10.1)
CALCIUM SERPL-MCNC: 8.6 MG/DL (ref 8.5–10.1)
CALCIUM SERPL-MCNC: 9 MG/DL (ref 8.5–10.1)
CHLORIDE SERPL-SCNC: 87 MMOL/L (ref 94–109)
CHLORIDE SERPL-SCNC: 91 MMOL/L (ref 94–109)
CHLORIDE SERPL-SCNC: 94 MMOL/L (ref 94–109)
CHLORIDE SERPL-SCNC: 98 MMOL/L (ref 94–109)
CO2 SERPL-SCNC: 23 MMOL/L (ref 20–32)
CO2 SERPL-SCNC: 24 MMOL/L (ref 20–32)
CO2 SERPL-SCNC: 25 MMOL/L (ref 20–32)
CO2 SERPL-SCNC: 25 MMOL/L (ref 20–32)
COLOR UR AUTO: ABNORMAL
COLOR UR AUTO: NORMAL
CREAT SERPL-MCNC: 0.73 MG/DL (ref 0.52–1.04)
CREAT SERPL-MCNC: 0.82 MG/DL (ref 0.7–1.2)
CREAT SERPL-MCNC: 0.84 MG/DL (ref 0.7–1.2)
CREAT SERPL-MCNC: 0.86 MG/DL (ref 0.52–1.04)
CREAT SERPL-MCNC: 0.87 MG/DL (ref 0.52–1.04)
CREAT SERPL-MCNC: 0.91 MG/DL (ref 0.52–1.04)
CREAT SERPL-MCNC: 0.96 MG/DL (ref 0.52–1.04)
CREAT SERPL-MCNC: 1.13 MG/DL (ref 0.52–1.04)
DIFFERENTIAL METHOD BLD: ABNORMAL
EOSINOPHIL # BLD AUTO: 0 10E9/L (ref 0–0.7)
EOSINOPHIL NFR BLD AUTO: 0.4 %
ERYTHROCYTE [DISTWIDTH] IN BLOOD BY AUTOMATED COUNT: 16.2 % (ref 10–15)
GFR SERPL CREATININE-BSD FRML MDRD: 41 ML/MIN/{1.73_M2}
GFR SERPL CREATININE-BSD FRML MDRD: 51 ML/MIN/{1.73_M2}
GFR SERPL CREATININE-BSD FRML MDRD: 54 ML/MIN/{1.73_M2}
GFR SERPL CREATININE-BSD FRML MDRD: 57 ML/MIN/{1.73_M2}
GFR SERPL CREATININE-BSD FRML MDRD: 58 ML/MIN/{1.73_M2}
GFR SERPL CREATININE-BSD FRML MDRD: 70 ML/MIN/{1.73_M2}
GFR SERPL CREATININE-BSD FRML MDRD: >60 ML/MIN/1.73M2
GFR SERPL CREATININE-BSD FRML MDRD: >60 ML/MIN/1.73M2
GLUCOSE SERPL-MCNC: 100 MG/DL (ref 70–99)
GLUCOSE SERPL-MCNC: 116 MG/DL (ref 70–99)
GLUCOSE SERPL-MCNC: 89 MG/DL (ref 70–99)
GLUCOSE SERPL-MCNC: 91 MG/DL (ref 60–99)
GLUCOSE SERPL-MCNC: 91 MG/DL (ref 70–99)
GLUCOSE SERPL-MCNC: 91 MG/DL (ref 70–99)
GLUCOSE SERPL-MCNC: 94 MG/DL (ref 70–99)
GLUCOSE SERPL-MCNC: 97 MG/DL (ref 60–99)
GLUCOSE UR STRIP-MCNC: NEGATIVE MG/DL
HCT VFR BLD AUTO: 34.1 % (ref 35–47)
HGB BLD-MCNC: 11.5 G/DL (ref 11.7–15.7)
HGB UR QL STRIP: ABNORMAL
HGB UR QL STRIP: NEGATIVE
IMM GRANULOCYTES # BLD: 0 10E9/L (ref 0–0.4)
IMM GRANULOCYTES NFR BLD: 0.4 %
KETONES UR STRIP-MCNC: NEGATIVE MG/DL
LABORATORY COMMENT REPORT: NORMAL
LEUKOCYTE ESTERASE UR QL STRIP: ABNORMAL
LEUKOCYTE ESTERASE UR QL STRIP: ABNORMAL
LEUKOCYTE ESTERASE UR QL STRIP: NEGATIVE
LYMPHOCYTES # BLD AUTO: 1.3 10E9/L (ref 0.8–5.3)
LYMPHOCYTES NFR BLD AUTO: 28.7 %
MCH RBC QN AUTO: 27.3 PG (ref 26.5–33)
MCHC RBC AUTO-ENTMCNC: 33.7 G/DL (ref 31.5–36.5)
MCV RBC AUTO: 81 FL (ref 78–100)
MONOCYTES # BLD AUTO: 0.7 10E9/L (ref 0–1.3)
MONOCYTES NFR BLD AUTO: 14.6 %
MUCOUS THREADS #/AREA URNS LPF: PRESENT /LPF
NEUTROPHILS # BLD AUTO: 2.5 10E9/L (ref 1.6–8.3)
NEUTROPHILS NFR BLD AUTO: 55.5 %
NITRATE UR QL: NEGATIVE
NITRATE UR QL: POSITIVE
NRBC # BLD AUTO: 0 10*3/UL
NRBC BLD AUTO-RTO: 0 /100
OSMOLALITY SERPL: 257 MMOL/KG (ref 280–295)
OSMOLALITY UR: 308 MMOL/KG (ref 100–1200)
PH UR STRIP: 5.5 PH (ref 4.7–8)
PH UR STRIP: 5.5 PH (ref 4.7–8)
PH UR STRIP: 6 PH (ref 4.7–8)
PH UR STRIP: 6.5 PH (ref 4.7–8)
PH UR STRIP: 6.5 PH (ref 4.7–8)
PLATELET # BLD AUTO: 311 10E9/L (ref 150–450)
POTASSIUM SERPL-SCNC: 3.9 MMOL/L (ref 3.4–5.3)
POTASSIUM SERPL-SCNC: 4 MMOL/L (ref 3.4–5.3)
POTASSIUM SERPL-SCNC: 4.2 MMOL/L (ref 3.4–5.3)
POTASSIUM SERPL-SCNC: 4.3 MMOL/L (ref 3.4–5.3)
POTASSIUM SERPL-SCNC: 4.5 MMOL/L (ref 3.4–5.3)
POTASSIUM SERPL-SCNC: 4.6 MMOL/L (ref 3.5–5.1)
POTASSIUM SERPL-SCNC: 4.8 MMOL/L (ref 3.5–5.1)
POTASSIUM SERPL-SCNC: 4.9 MMOL/L (ref 3.4–5.3)
PROT SERPL-MCNC: 7.3 G/DL (ref 6.8–8.8)
RBC # BLD AUTO: 4.21 10E12/L (ref 3.8–5.2)
RBC #/AREA URNS AUTO: 1 /HPF (ref 0–2)
RBC #/AREA URNS AUTO: 1 /HPF (ref 0–2)
RBC #/AREA URNS AUTO: <1 /HPF (ref 0–2)
RBC #/AREA URNS AUTO: <1 /HPF (ref 0–2)
SARS-COV-2 RNA SPEC QL NAA+PROBE: NEGATIVE
SARS-COV-2 RNA SPEC QL NAA+PROBE: NORMAL
SODIUM SERPL-SCNC: 120 MMOL/L (ref 133–144)
SODIUM SERPL-SCNC: 123 MMOL/L (ref 133–144)
SODIUM SERPL-SCNC: 125 MMOL/L (ref 133–144)
SODIUM SERPL-SCNC: 126 MMOL/L (ref 133–144)
SODIUM SERPL-SCNC: 127 MMOL/L (ref 133–144)
SODIUM SERPL-SCNC: 128 MMOL/L (ref 133–144)
SODIUM UR-SCNC: 37 MMOL/L
SOURCE: ABNORMAL
SOURCE: NORMAL
SP GR UR STRIP: 1 (ref 1–1.03)
SP GR UR STRIP: 1.01 (ref 1–1.03)
SPECIMEN SOURCE: ABNORMAL
SPECIMEN SOURCE: ABNORMAL
SPECIMEN SOURCE: NORMAL
SPECIMEN SOURCE: NORMAL
SQUAMOUS #/AREA URNS AUTO: 0 /HPF (ref 0–1)
SQUAMOUS #/AREA URNS AUTO: 1 /HPF (ref 0–1)
SQUAMOUS #/AREA URNS AUTO: 1 /HPF (ref 0–1)
SQUAMOUS #/AREA URNS AUTO: <1 /HPF (ref 0–1)
TSH SERPL-ACNC: 2.04 MIU/ML (ref 0.35–4.8)
UROBILINOGEN UR STRIP-MCNC: NORMAL MG/DL (ref 0–2)
WBC # BLD AUTO: 4.5 10E9/L (ref 4–11)
WBC #/AREA URNS AUTO: 0 /HPF (ref 0–5)
WBC #/AREA URNS AUTO: 15 /HPF (ref 0–5)
WBC #/AREA URNS AUTO: 9 /HPF (ref 0–5)
WBC #/AREA URNS AUTO: <1 /HPF (ref 0–5)

## 2020-01-01 PROCEDURE — 80048 BASIC METABOLIC PNL TOTAL CA: CPT | Performed by: INTERNAL MEDICINE

## 2020-01-01 PROCEDURE — 80048 BASIC METABOLIC PNL TOTAL CA: CPT | Mod: ZL | Performed by: FAMILY MEDICINE

## 2020-01-01 PROCEDURE — U0003 INFECTIOUS AGENT DETECTION BY NUCLEIC ACID (DNA OR RNA); SEVERE ACUTE RESPIRATORY SYNDROME CORONAVIRUS 2 (SARS-COV-2) (CORONAVIRUS DISEASE [COVID-19]), AMPLIFIED PROBE TECHNIQUE, MAKING USE OF HIGH THROUGHPUT TECHNOLOGIES AS DESCRIBED BY CMS-2020-01-R: HCPCS | Performed by: INTERNAL MEDICINE

## 2020-01-01 PROCEDURE — 99213 OFFICE O/P EST LOW 20 MIN: CPT | Performed by: FAMILY MEDICINE

## 2020-01-01 PROCEDURE — 36415 COLL VENOUS BLD VENIPUNCTURE: CPT | Mod: ZL | Performed by: FAMILY MEDICINE

## 2020-01-01 PROCEDURE — G0463 HOSPITAL OUTPT CLINIC VISIT: HCPCS

## 2020-01-01 PROCEDURE — 258N000003 HC RX IP 258 OP 636: Performed by: INTERNAL MEDICINE

## 2020-01-01 PROCEDURE — 81001 URINALYSIS AUTO W/SCOPE: CPT | Mod: ZL | Performed by: FAMILY MEDICINE

## 2020-01-01 PROCEDURE — 99217 PR OBSERVATION CARE DISCHARGE: CPT | Performed by: INTERNAL MEDICINE

## 2020-01-01 PROCEDURE — 80048 BASIC METABOLIC PNL TOTAL CA: CPT | Mod: 91 | Performed by: INTERNAL MEDICINE

## 2020-01-01 PROCEDURE — 83935 ASSAY OF URINE OSMOLALITY: CPT | Mod: ZL | Performed by: FAMILY MEDICINE

## 2020-01-01 PROCEDURE — 71046 X-RAY EXAM CHEST 2 VIEWS: CPT | Mod: TC

## 2020-01-01 PROCEDURE — G0378 HOSPITAL OBSERVATION PER HR: HCPCS

## 2020-01-01 PROCEDURE — 99214 OFFICE O/P EST MOD 30 MIN: CPT | Performed by: NURSE PRACTITIONER

## 2020-01-01 PROCEDURE — 36415 COLL VENOUS BLD VENIPUNCTURE: CPT | Performed by: INTERNAL MEDICINE

## 2020-01-01 PROCEDURE — 51798 US URINE CAPACITY MEASURE: CPT

## 2020-01-01 PROCEDURE — 81001 URINALYSIS AUTO W/SCOPE: CPT | Performed by: NURSE PRACTITIONER

## 2020-01-01 PROCEDURE — 36415 COLL VENOUS BLD VENIPUNCTURE: CPT | Performed by: NURSE PRACTITIONER

## 2020-01-01 PROCEDURE — 87086 URINE CULTURE/COLONY COUNT: CPT | Mod: ZL | Performed by: FAMILY MEDICINE

## 2020-01-01 PROCEDURE — G0463 HOSPITAL OUTPT CLINIC VISIT: HCPCS | Mod: 25

## 2020-01-01 PROCEDURE — 250N000013 HC RX MED GY IP 250 OP 250 PS 637: Performed by: INTERNAL MEDICINE

## 2020-01-01 PROCEDURE — 84300 ASSAY OF URINE SODIUM: CPT | Mod: ZL | Performed by: FAMILY MEDICINE

## 2020-01-01 PROCEDURE — 83930 ASSAY OF BLOOD OSMOLALITY: CPT | Mod: ZL | Performed by: FAMILY MEDICINE

## 2020-01-01 PROCEDURE — 85025 COMPLETE CBC W/AUTO DIFF WBC: CPT | Mod: ZL | Performed by: FAMILY MEDICINE

## 2020-01-01 PROCEDURE — 87186 SC STD MICRODIL/AGAR DIL: CPT | Mod: ZL | Performed by: FAMILY MEDICINE

## 2020-01-01 PROCEDURE — 99220 PR INITIAL OBSERVATION CARE,LEVEL III: CPT | Performed by: INTERNAL MEDICINE

## 2020-01-01 PROCEDURE — 999N000209 HC STATISTIC OT OUTPT VISIT

## 2020-01-01 PROCEDURE — 87088 URINE BACTERIA CULTURE: CPT | Mod: ZL | Performed by: FAMILY MEDICINE

## 2020-01-01 PROCEDURE — 81003 URINALYSIS AUTO W/O SCOPE: CPT | Mod: ZL | Performed by: FAMILY MEDICINE

## 2020-01-01 PROCEDURE — 80053 COMPREHEN METABOLIC PANEL: CPT | Mod: ZL | Performed by: FAMILY MEDICINE

## 2020-01-01 PROCEDURE — 80048 BASIC METABOLIC PNL TOTAL CA: CPT | Performed by: NURSE PRACTITIONER

## 2020-01-01 RX ORDER — LORAZEPAM 0.5 MG/1
TABLET ORAL
Qty: 30 TABLET | Refills: 0 | Status: SHIPPED | OUTPATIENT
Start: 2020-01-01 | End: 2020-01-01

## 2020-01-01 RX ORDER — PANTOPRAZOLE SODIUM 40 MG/1
40 TABLET, DELAYED RELEASE ORAL
Status: DISCONTINUED | OUTPATIENT
Start: 2020-01-01 | End: 2020-01-01 | Stop reason: HOSPADM

## 2020-01-01 RX ORDER — SULFAMETHOXAZOLE/TRIMETHOPRIM 800-160 MG
1 TABLET ORAL 2 TIMES DAILY
Qty: 10 TABLET | Refills: 0 | Status: SHIPPED | OUTPATIENT
Start: 2020-01-01 | End: 2020-01-01

## 2020-01-01 RX ORDER — ACETAMINOPHEN 325 MG/1
650 TABLET ORAL EVERY 4 HOURS PRN
Status: DISCONTINUED | OUTPATIENT
Start: 2020-01-01 | End: 2020-01-01 | Stop reason: HOSPADM

## 2020-01-01 RX ORDER — ACETAMINOPHEN 325 MG/1
1000 TABLET ORAL EVERY 6 HOURS PRN
Status: DISCONTINUED | OUTPATIENT
Start: 2020-01-01 | End: 2020-01-01

## 2020-01-01 RX ORDER — ONDANSETRON 2 MG/ML
4 INJECTION INTRAMUSCULAR; INTRAVENOUS EVERY 6 HOURS PRN
Status: DISCONTINUED | OUTPATIENT
Start: 2020-01-01 | End: 2020-01-01 | Stop reason: HOSPADM

## 2020-01-01 RX ORDER — METOPROLOL TARTRATE 25 MG/1
25 TABLET, FILM COATED ORAL 2 TIMES DAILY
Qty: 30 TABLET | Refills: 11 | Status: SHIPPED | OUTPATIENT
Start: 2020-01-01 | End: 2021-01-01

## 2020-01-01 RX ORDER — ONDANSETRON 4 MG/1
4 TABLET, ORALLY DISINTEGRATING ORAL EVERY 6 HOURS PRN
Status: DISCONTINUED | OUTPATIENT
Start: 2020-01-01 | End: 2020-01-01 | Stop reason: HOSPADM

## 2020-01-01 RX ORDER — FUROSEMIDE 20 MG
10 TABLET ORAL DAILY
Qty: 90 TABLET | Refills: 3 | COMMUNITY
Start: 2020-01-01 | End: 2021-01-01

## 2020-01-01 RX ORDER — AMOXICILLIN 250 MG
2 CAPSULE ORAL 2 TIMES DAILY
Status: DISCONTINUED | OUTPATIENT
Start: 2020-01-01 | End: 2020-01-01 | Stop reason: HOSPADM

## 2020-01-01 RX ORDER — AMOXICILLIN 250 MG
1 CAPSULE ORAL 2 TIMES DAILY
Status: DISCONTINUED | OUTPATIENT
Start: 2020-01-01 | End: 2020-01-01 | Stop reason: HOSPADM

## 2020-01-01 RX ORDER — SODIUM CHLORIDE 9 MG/ML
INJECTION, SOLUTION INTRAVENOUS CONTINUOUS
Status: ACTIVE | OUTPATIENT
Start: 2020-01-01 | End: 2020-01-01

## 2020-01-01 RX ORDER — POLYETHYLENE GLYCOL 3350 17 G/17G
17 POWDER, FOR SOLUTION ORAL DAILY
Status: DISCONTINUED | OUTPATIENT
Start: 2020-01-01 | End: 2020-01-01 | Stop reason: HOSPADM

## 2020-01-01 RX ORDER — CEFPROZIL 500 MG/1
500 TABLET, FILM COATED ORAL 2 TIMES DAILY
Qty: 20 TABLET | Refills: 0 | Status: SHIPPED | OUTPATIENT
Start: 2020-01-01 | End: 2020-01-01

## 2020-01-01 RX ORDER — PANTOPRAZOLE SODIUM 40 MG/1
TABLET, DELAYED RELEASE ORAL
Qty: 30 TABLET | Refills: 0 | Status: SHIPPED | OUTPATIENT
Start: 2020-01-01 | End: 2020-01-01

## 2020-01-01 RX ORDER — ACETAMINOPHEN 650 MG/1
650 SUPPOSITORY RECTAL EVERY 4 HOURS PRN
Status: DISCONTINUED | OUTPATIENT
Start: 2020-01-01 | End: 2020-01-01 | Stop reason: HOSPADM

## 2020-01-01 RX ORDER — LORAZEPAM 0.5 MG/1
TABLET ORAL
Qty: 30 TABLET | Refills: 0 | Status: SHIPPED | OUTPATIENT
Start: 2020-01-01 | End: 2021-01-01

## 2020-01-01 RX ADMIN — SODIUM CHLORIDE: 9 INJECTION, SOLUTION INTRAVENOUS at 01:24

## 2020-01-01 RX ADMIN — SENNOSIDES AND DOCUSATE SODIUM 2 TABLET: 8.6; 5 TABLET ORAL at 09:20

## 2020-01-01 RX ADMIN — PANTOPRAZOLE SODIUM 40 MG: 40 TABLET, DELAYED RELEASE ORAL at 06:56

## 2020-01-01 RX ADMIN — SODIUM CHLORIDE 250 ML: 9 INJECTION, SOLUTION INTRAVENOUS at 20:37

## 2020-01-01 RX ADMIN — POLYETHYLENE GLYCOL 3350 17 G: 17 POWDER, FOR SOLUTION ORAL at 09:20

## 2020-01-01 ASSESSMENT — ENCOUNTER SYMPTOMS
FEVER: 0
ABDOMINAL PAIN: 0
CHILLS: 0
DIZZINESS: 0
DIARRHEA: 0
DYSURIA: 1
DIFFICULTY URINATING: 1
SHORTNESS OF BREATH: 0
HEMATURIA: 0
HEADACHES: 0
BACK PAIN: 0
LIGHT-HEADEDNESS: 0
VOMITING: 0
FREQUENCY: 0
ACTIVITY CHANGE: 1
NAUSEA: 0

## 2020-01-01 ASSESSMENT — MIFFLIN-ST. JEOR
SCORE: 912.13
SCORE: 912.03

## 2020-01-01 ASSESSMENT — COLUMBIA-SUICIDE SEVERITY RATING SCALE - C-SSRS
1. IN THE PAST MONTH, HAVE YOU WISHED YOU WERE DEAD OR WISHED YOU COULD GO TO SLEEP AND NOT WAKE UP?: NO
3. HAVE YOU BEEN THINKING ABOUT HOW YOU MIGHT KILL YOURSELF?: NO
2. HAVE YOU ACTUALLY HAD ANY THOUGHTS OF KILLING YOURSELF IN THE PAST MONTH?: NO

## 2020-01-01 ASSESSMENT — PAIN SCALES - GENERAL
PAINLEVEL: NO PAIN (0)

## 2020-01-02 DIAGNOSIS — K92.2 ACUTE GI BLEEDING: ICD-10-CM

## 2020-01-03 DIAGNOSIS — F41.9 ANXIETY: ICD-10-CM

## 2020-01-03 RX ORDER — PANTOPRAZOLE SODIUM 40 MG/1
TABLET, DELAYED RELEASE ORAL
Qty: 60 TABLET | Refills: 0 | Status: SHIPPED | OUTPATIENT
Start: 2020-01-03 | End: 2020-02-19

## 2020-01-06 RX ORDER — LORAZEPAM 0.5 MG/1
TABLET ORAL
Qty: 30 TABLET | Refills: 0 | Status: SHIPPED | OUTPATIENT
Start: 2020-01-06 | End: 2020-02-19

## 2020-01-06 NOTE — TELEPHONE ENCOUNTER
lorazepam      Last Written Prescription Date:  12/10/19  Last Fill Quantity: 30,   # refills: 0  Last Office Visit: 11/25/19  Future Office visit:       Routing refill request to provider for review/approval because:  Drug not on the [facility], P or [facility] refill protocol or controlled substance

## 2020-01-31 ENCOUNTER — TELEPHONE (OUTPATIENT)
Dept: FAMILY MEDICINE | Facility: OTHER | Age: 85
End: 2020-01-31

## 2020-01-31 DIAGNOSIS — M15.0 PRIMARY OSTEOARTHRITIS INVOLVING MULTIPLE JOINTS: Primary | ICD-10-CM

## 2020-01-31 NOTE — TELEPHONE ENCOUNTER
3:29 PM    Reason for Call: Phone Call    Description: Pt states she has been using a cane for walking, but she doesn't feel she has enough control with a cane any more. She would like to know if Dr. SNEHAL Jones could order/prescribe a walker for her so she can feel more steady when getting around.    Was an appointment offered for this call? No  If yes : Appointment type              Date    Preferred method for responding to this message: Telephone Call  What is your phone number ? [phone]    If we cannot reach you directly, may we leave a detailed response at the number you provided? Yes    Can this message wait until your PCP/provider returns, if available today? YES, pt is aware she may not get a call back until Monday    Shameka Deng

## 2020-02-19 ENCOUNTER — APPOINTMENT (OUTPATIENT)
Dept: GENERAL RADIOLOGY | Facility: HOSPITAL | Age: 85
DRG: 683 | End: 2020-02-19
Attending: PHYSICIAN ASSISTANT
Payer: COMMERCIAL

## 2020-02-19 ENCOUNTER — HOSPITAL ENCOUNTER (INPATIENT)
Facility: HOSPITAL | Age: 85
LOS: 1 days | Discharge: HOME | DRG: 683 | End: 2020-02-20
Attending: PHYSICIAN ASSISTANT | Admitting: INTERNAL MEDICINE
Payer: COMMERCIAL

## 2020-02-19 ENCOUNTER — NURSE TRIAGE (OUTPATIENT)
Dept: FAMILY MEDICINE | Facility: OTHER | Age: 85
End: 2020-02-19

## 2020-02-19 DIAGNOSIS — J06.9 UPPER RESPIRATORY TRACT INFECTION, UNSPECIFIED TYPE: Primary | ICD-10-CM

## 2020-02-19 DIAGNOSIS — I50.9 ACUTE CHF (CONGESTIVE HEART FAILURE) (H): ICD-10-CM

## 2020-02-19 DIAGNOSIS — I10 ESSENTIAL HYPERTENSION: ICD-10-CM

## 2020-02-19 DIAGNOSIS — I16.1 HYPERTENSIVE EMERGENCY: ICD-10-CM

## 2020-02-19 DIAGNOSIS — I50.9 CONGESTIVE HEART FAILURE, UNSPECIFIED HF CHRONICITY, UNSPECIFIED HEART FAILURE TYPE (H): ICD-10-CM

## 2020-02-19 LAB
ANION GAP SERPL CALCULATED.3IONS-SCNC: 8 MMOL/L (ref 3–14)
BASOPHILS # BLD AUTO: 0 10E9/L (ref 0–0.2)
BASOPHILS NFR BLD AUTO: 0.5 %
BUN SERPL-MCNC: 24 MG/DL (ref 7–30)
CALCIUM SERPL-MCNC: 8.8 MG/DL (ref 8.5–10.1)
CHLORIDE SERPL-SCNC: 105 MMOL/L (ref 94–109)
CO2 SERPL-SCNC: 25 MMOL/L (ref 20–32)
CREAT SERPL-MCNC: 0.85 MG/DL (ref 0.52–1.04)
D DIMER PPP FEU-MCNC: 1.4 UG/ML FEU (ref 0–0.5)
DIFFERENTIAL METHOD BLD: ABNORMAL
EOSINOPHIL # BLD AUTO: 0.1 10E9/L (ref 0–0.7)
EOSINOPHIL NFR BLD AUTO: 1.7 %
ERYTHROCYTE [DISTWIDTH] IN BLOOD BY AUTOMATED COUNT: 20.4 % (ref 10–15)
GFR SERPL CREATININE-BSD FRML MDRD: 59 ML/MIN/{1.73_M2}
GLUCOSE SERPL-MCNC: 91 MG/DL (ref 70–99)
HCT VFR BLD AUTO: 33.2 % (ref 35–47)
HGB BLD-MCNC: 10.3 G/DL (ref 11.7–15.7)
IMM GRANULOCYTES # BLD: 0 10E9/L (ref 0–0.4)
IMM GRANULOCYTES NFR BLD: 0.3 %
IRON SERPL-MCNC: 26 UG/DL (ref 35–180)
LYMPHOCYTES # BLD AUTO: 1.2 10E9/L (ref 0.8–5.3)
LYMPHOCYTES NFR BLD AUTO: 20 %
MCH RBC QN AUTO: 23.2 PG (ref 26.5–33)
MCHC RBC AUTO-ENTMCNC: 31 G/DL (ref 31.5–36.5)
MCV RBC AUTO: 75 FL (ref 78–100)
MONOCYTES # BLD AUTO: 0.6 10E9/L (ref 0–1.3)
MONOCYTES NFR BLD AUTO: 10.3 %
NEUTROPHILS # BLD AUTO: 4 10E9/L (ref 1.6–8.3)
NEUTROPHILS NFR BLD AUTO: 67.2 %
NRBC # BLD AUTO: 0 10*3/UL
NRBC BLD AUTO-RTO: 0 /100
NT-PROBNP SERPL-MCNC: 7573 PG/ML (ref 0–1800)
PLATELET # BLD AUTO: 296 10E9/L (ref 150–450)
POTASSIUM SERPL-SCNC: 3.8 MMOL/L (ref 3.4–5.3)
RBC # BLD AUTO: 4.44 10E12/L (ref 3.8–5.2)
SODIUM SERPL-SCNC: 138 MMOL/L (ref 133–144)
TROPONIN I SERPL-MCNC: 0.07 UG/L (ref 0–0.04)
TROPONIN I SERPL-MCNC: 0.08 UG/L (ref 0–0.04)
WBC # BLD AUTO: 5.9 10E9/L (ref 4–11)

## 2020-02-19 PROCEDURE — 99222 1ST HOSP IP/OBS MODERATE 55: CPT | Performed by: INTERNAL MEDICINE

## 2020-02-19 PROCEDURE — 25000132 ZZH RX MED GY IP 250 OP 250 PS 637: Performed by: INTERNAL MEDICINE

## 2020-02-19 PROCEDURE — 12000000 ZZH R&B MED SURG/OB

## 2020-02-19 PROCEDURE — 25000132 ZZH RX MED GY IP 250 OP 250 PS 637: Performed by: PHYSICIAN ASSISTANT

## 2020-02-19 PROCEDURE — 80048 BASIC METABOLIC PNL TOTAL CA: CPT | Performed by: PHYSICIAN ASSISTANT

## 2020-02-19 PROCEDURE — 36415 COLL VENOUS BLD VENIPUNCTURE: CPT | Performed by: PHYSICIAN ASSISTANT

## 2020-02-19 PROCEDURE — 85379 FIBRIN DEGRADATION QUANT: CPT | Performed by: PHYSICIAN ASSISTANT

## 2020-02-19 PROCEDURE — 85025 COMPLETE CBC W/AUTO DIFF WBC: CPT | Performed by: PHYSICIAN ASSISTANT

## 2020-02-19 PROCEDURE — 93010 ELECTROCARDIOGRAM REPORT: CPT | Performed by: INTERNAL MEDICINE

## 2020-02-19 PROCEDURE — 99285 EMERGENCY DEPT VISIT HI MDM: CPT | Mod: Z6 | Performed by: PHYSICIAN ASSISTANT

## 2020-02-19 PROCEDURE — 99285 EMERGENCY DEPT VISIT HI MDM: CPT | Mod: 25

## 2020-02-19 PROCEDURE — 83540 ASSAY OF IRON: CPT | Performed by: INTERNAL MEDICINE

## 2020-02-19 PROCEDURE — 71046 X-RAY EXAM CHEST 2 VIEWS: CPT | Mod: TC

## 2020-02-19 PROCEDURE — 83880 ASSAY OF NATRIURETIC PEPTIDE: CPT | Performed by: PHYSICIAN ASSISTANT

## 2020-02-19 PROCEDURE — 96374 THER/PROPH/DIAG INJ IV PUSH: CPT

## 2020-02-19 PROCEDURE — 93005 ELECTROCARDIOGRAM TRACING: CPT

## 2020-02-19 PROCEDURE — 84484 ASSAY OF TROPONIN QUANT: CPT | Performed by: PHYSICIAN ASSISTANT

## 2020-02-19 PROCEDURE — 40000786 ZZHCL STATISTIC ACTIVE MRSA SURVEILLANCE CULTURE: Performed by: INTERNAL MEDICINE

## 2020-02-19 PROCEDURE — 25000128 H RX IP 250 OP 636: Performed by: PHYSICIAN ASSISTANT

## 2020-02-19 RX ORDER — FUROSEMIDE 40 MG
40 TABLET ORAL DAILY
Status: DISCONTINUED | OUTPATIENT
Start: 2020-02-20 | End: 2020-02-20 | Stop reason: HOSPADM

## 2020-02-19 RX ORDER — NALOXONE HYDROCHLORIDE 0.4 MG/ML
.1-.4 INJECTION, SOLUTION INTRAMUSCULAR; INTRAVENOUS; SUBCUTANEOUS
Status: DISCONTINUED | OUTPATIENT
Start: 2020-02-19 | End: 2020-02-20 | Stop reason: HOSPADM

## 2020-02-19 RX ORDER — PANTOPRAZOLE SODIUM 40 MG/1
40 TABLET, DELAYED RELEASE ORAL 2 TIMES DAILY
Status: DISCONTINUED | OUTPATIENT
Start: 2020-02-19 | End: 2020-02-20 | Stop reason: HOSPADM

## 2020-02-19 RX ORDER — AMOXICILLIN 250 MG
1 CAPSULE ORAL 2 TIMES DAILY
Status: DISCONTINUED | OUTPATIENT
Start: 2020-02-19 | End: 2020-02-20 | Stop reason: HOSPADM

## 2020-02-19 RX ORDER — METOPROLOL TARTRATE 50 MG
50 TABLET ORAL 2 TIMES DAILY
Status: DISCONTINUED | OUTPATIENT
Start: 2020-02-19 | End: 2020-02-20 | Stop reason: HOSPADM

## 2020-02-19 RX ORDER — ACETAMINOPHEN 500 MG
1000 TABLET ORAL EVERY 6 HOURS PRN
COMMUNITY

## 2020-02-19 RX ORDER — NITROGLYCERIN 0.4 MG/1
0.4 TABLET SUBLINGUAL EVERY 5 MIN PRN
Status: DISCONTINUED | OUTPATIENT
Start: 2020-02-19 | End: 2020-02-20 | Stop reason: HOSPADM

## 2020-02-19 RX ORDER — LIDOCAINE 40 MG/G
CREAM TOPICAL
Status: DISCONTINUED | OUTPATIENT
Start: 2020-02-19 | End: 2020-02-20 | Stop reason: HOSPADM

## 2020-02-19 RX ORDER — LORAZEPAM 0.5 MG/1
0.5 TABLET ORAL EVERY 8 HOURS PRN
Status: DISCONTINUED | OUTPATIENT
Start: 2020-02-19 | End: 2020-02-20 | Stop reason: HOSPADM

## 2020-02-19 RX ORDER — AMOXICILLIN 250 MG
2 CAPSULE ORAL 2 TIMES DAILY
Status: DISCONTINUED | OUTPATIENT
Start: 2020-02-19 | End: 2020-02-20 | Stop reason: HOSPADM

## 2020-02-19 RX ORDER — ACETAMINOPHEN 325 MG/1
650 TABLET ORAL EVERY 6 HOURS PRN
Status: DISCONTINUED | OUTPATIENT
Start: 2020-02-19 | End: 2020-02-20 | Stop reason: HOSPADM

## 2020-02-19 RX ORDER — PANTOPRAZOLE SODIUM 40 MG/1
40 TABLET, DELAYED RELEASE ORAL 2 TIMES DAILY
COMMUNITY
End: 2020-03-20

## 2020-02-19 RX ORDER — AMLODIPINE BESYLATE 5 MG/1
5 TABLET ORAL DAILY
Status: DISCONTINUED | OUTPATIENT
Start: 2020-02-19 | End: 2020-02-20

## 2020-02-19 RX ORDER — ISOSORBIDE MONONITRATE 30 MG/1
30 TABLET, EXTENDED RELEASE ORAL AT BEDTIME
Status: DISCONTINUED | OUTPATIENT
Start: 2020-02-19 | End: 2020-02-20

## 2020-02-19 RX ORDER — CALCIUM CARBONATE 500 MG/1
1000 TABLET, CHEWABLE ORAL 4 TIMES DAILY PRN
Status: DISCONTINUED | OUTPATIENT
Start: 2020-02-19 | End: 2020-02-20 | Stop reason: HOSPADM

## 2020-02-19 RX ORDER — HYDRALAZINE HYDROCHLORIDE 20 MG/ML
5 INJECTION INTRAMUSCULAR; INTRAVENOUS EVERY 4 HOURS PRN
Status: DISCONTINUED | OUTPATIENT
Start: 2020-02-19 | End: 2020-02-20 | Stop reason: HOSPADM

## 2020-02-19 RX ORDER — FUROSEMIDE 10 MG/ML
20 INJECTION INTRAMUSCULAR; INTRAVENOUS ONCE
Status: COMPLETED | OUTPATIENT
Start: 2020-02-19 | End: 2020-02-19

## 2020-02-19 RX ADMIN — ISOSORBIDE MONONITRATE 30 MG: 30 TABLET, EXTENDED RELEASE ORAL at 21:24

## 2020-02-19 RX ADMIN — AMLODIPINE BESYLATE 5 MG: 5 TABLET ORAL at 19:54

## 2020-02-19 RX ADMIN — PANTOPRAZOLE SODIUM 40 MG: 40 TABLET, DELAYED RELEASE ORAL at 20:08

## 2020-02-19 RX ADMIN — METOPROLOL TARTRATE 50 MG: 50 TABLET, FILM COATED ORAL at 19:54

## 2020-02-19 RX ADMIN — FUROSEMIDE 20 MG: 10 INJECTION, SOLUTION INTRAMUSCULAR; INTRAVENOUS at 14:14

## 2020-02-19 RX ADMIN — NITROGLYCERIN 30 MG: 20 OINTMENT TOPICAL at 14:14

## 2020-02-19 ASSESSMENT — ACTIVITIES OF DAILY LIVING (ADL): ADLS_ACUITY_SCORE: 14

## 2020-02-19 ASSESSMENT — ENCOUNTER SYMPTOMS
CHEST TIGHTNESS: 0
DIZZINESS: 0
FEVER: 0
CONFUSION: 0
SHORTNESS OF BREATH: 1
GASTROINTESTINAL NEGATIVE: 1
MUSCULOSKELETAL NEGATIVE: 1
EYES NEGATIVE: 1
COUGH: 0

## 2020-02-19 ASSESSMENT — MIFFLIN-ST. JEOR: SCORE: 802.75

## 2020-02-19 NOTE — PLAN OF CARE
[facility] Inpatient Admission Note:    Patient admitted to 3224/3224-1 at approximately 1450 via cart accompanied by transport tech from emergency room . Report received from [facility] in SBAR format at 1430 via telephone. Patient ambulated to bed via self.. Patient is alert and oriented X 3, denies pain; rates at 0 on 0-10 scale.  Patient oriented to room, unit, hourly rounding, and plan of care. Explained admission packet and patient handbook with patient bill of rights brochure. Will continue to monitor and document as needed.     Inpatient Nursing criteria listed below was met:    MRSA swab completed for patient 65 years and older: Yes        This writer obtained VS, flushed IV, ht/wt and MRSA swab completed.  Med scribe in room at this time.  Writer did NOT tell scribe that pt has had her medications today, writer unaware of when pt last took meds.  Rest of admit deferred to afternoons.    Face to face report given with opportunity to observe patient.    Report given to HEAVEN Herbert RN   2/19/2020  3:49 PM

## 2020-02-19 NOTE — PLAN OF CARE
Prior to Admission Medication Reconciliation:     Medications added:   [] None  [x] As listed below:    Pantoprazole- rx verified. Patient stated she doesn't use as directed. Son said it was prescribed for stomach bleeding some time ago.    Medications deleted:   [] None  [x] As listed below:    Calcium- patient stated she doesn't take it and instead drinks lots of milk    Changes made to existing medications:   [] None  [x] As listed below:    Multivitamin- she gets a very specific brand she has been using for years- Dr. Ayala [facility] multivitamin. I printed out a picture of the bottle to verify that's the one she takes. (she didn't seem specific about having to have it while admitted though)    Lorazepam- son stated she throws these pills away, she told me she has a few on hand in case she needs them but she told me she doesn't think she is anxious and barely needs to use them. They make her feel groggy.     Last times/dates taken verified with patient:  [] Yes- completed myself  [] Nurse completed no changes made  [] Unable to review with patient:  [x] Nurse completed/changes made:     Nurse stated patient took everything today. Patient told me otherwise. Changed to reflect.     Allergy modifications:    [x]Did not review  []Patient verified NKA  []Patient verified current existing allergies: no changes made  []New allergies: listed below    Medication reconciliation sources:   [x]Patient  []Patient family member/emergency contact: **  [][facility] Report Review  [x]Epic Chart Review  []Care Everywhere review  [x]Pharmacy med list: [facility]  []Pharmacy phone call  []Outside meds dispense report  []Nursing home MAR:  []Other: **    Is patient on coumadin?  [x]No      Requests for consultation by provider or pharmacist:   [] Patient understands why all of their meds were prescribed and how to take them. No questions.   [] Fill dates coincide with compliancy for all maintenance meds.   [x] Fill dates do  not coincide with compliancy with maintenance meds.  [] Patient has questions about the following:        Comments: the only maintenance med she seems compliant with is her isosorbide.       Jaki Breen on 2/19/2020 at 3:23 PM       Discrepancies: [x] No []Not Applicable []Yes: listed below        Time spent on medication reconciliation:   [x]5-20 mins  []20-40 mins  []> 40 mins    Issues completing PTA medication reconciliation:  [] On hold for a long time  [] Waited for a call back  [] Fax didn't come through  [] Fax took a long time  [] Other:    Notifying appropriate party of changes/additions/discrepancies:  [] Notified attending provider via text page  [] Notified attending provider in person  [] Notified pharmacy  [] Notified nurse  [] Attending provider not available, left detailed notes  [x] Changes/additions made don't need provider notification because provider has not seen patient or input any orders  [] Changes/additions made don't need provider notification because changes made are to medications not ordered      Medications Prior to Admission   Medication Sig Dispense Refill Last Dose     acetaminophen 500 MG PO tablet Take 1,000 mg by mouth as needed   2/18/2020 at Unknown time     Ascorbic Acid (VITAMIN C) 500 MG CAPS Take 500 mg by mouth daily   Past Week at Unknown time     isosorbide mononitrate (IMDUR) 30 MG 24 hr tablet TAKE ONE TABLET BY MOUTH AT BEDTIME. 90 tablet 3 2/19/2020 at Unknown time     LORazepam (ATIVAN) 0.5 MG tablet TAKE 1 TABLET BY MOUTH EVERY 8 HOURS AS NEEDED FOR ANXIETY 30 tablet 0 Past Week at Unknown time     Multiple Vitamins-Minerals (MULTIVITAMIN ADULT PO) Take 1 tablet by mouth daily    2/19/2020 at Unknown time     pantoprazole 40 MG PO EC tablet Take 40 mg by mouth 2 times daily   Past Month at Unknown time     nitroGLYcerin (NITROSTAT) 0.4 MG sublingual tablet Place 1 tablet (0.4mg) by sublingual route at the first sign of attack; may repeat ever 5 min until relief;  if pain persists after 3 tablets in 15 minutes prompt medical attention is recommended. AS NEEDED 25 tablet 3 Unknown at Unknown time

## 2020-02-19 NOTE — H&P
[facility]    History and Physical - Hospitalist Service       Date of Admission:  2/19/2020    Assessment & Plan   Lauren Barron is a 89 year old female admitted on 2/19/2020.    Hypertensive Emergency (contributing to acute CHF, elevated Troponin): start Metoprolol + Amlodipine + Lasix with PRN Hydralazine. Follow Troponin and monitor telemetry    CHF ( acute on chronic diastolic with preserved EF): last ECHO 5/2015, showing preserved EF with diastolic dysfunction. Update ECHO to evaluate and for management assistance. Hypertensive treatments also directed at CHF    Microcytic anemia: check iron stores    General: check UA     Diet: Cardiac   DVT Prophylaxis: Pneumatic Compression Devices  Squires Catheter: not present  Code Status: DNR/DNI    Disposition Plan   Expected discharge: 2 - 3 days, recommended to prior living arrangement once HTN and CHF management and follow up established.  Entered: Luciano Zuniga DO 02/19/2020, 5:43 PM     The patient's care was discussed with the Patient.    Luciano Zuniga DO  [facility]    ______________________________________________________________________    Chief Complaint   Shortness of breath and shakyness    History is obtained from the patient and by review of the EHR    History of Present Illness   Lauren Barron is a 89 year old female who  Has recently moved to the [place] for more security; [place] is a long[place]time  [place] family [place]. She had been feeling well and doesn't recall the specifics about today's reasoning for the ER encounter. The ER Provider note documents the short history of complaints about shortness of breath to her son prior to the ER encounter    ER Course: she was hypertensive (195/80, 208/89), but otherwise was stable but was initially tachypneic ( RR 40) and felt short of breath. Lab diagnostics showed chronic anemia (Hgb 10.3) with microcytosis  developing recently, normal WBC and preserved stage 3 renal failure. BNP was elevated (7573) and Troponin was elevated (0.079) as well. The patient was treated with IV Lasix and topical Nitroglycerin    Review of Systems  she has self-admitted short term memory deficit and can't recall the ROS prior to ER visit, but recalls that her move to the [place] was favorable and she has been feeling well    Currently on Med/Surg floor:    Constitutional: No fever or chills, no generalized weakness, no unintentional weight change, good appetite  Ears, Nose & Throat: no sore throat, no nasal drainage, no congestion. No ear pain, no ear drainage, no particular change in hearing  Eyes: no particular change in vision, no redness, no drainage  Cardiovascular: No chest pain at rest, no chest pain with familiar activities.  Pulmonary: No cough, no particular change in work of breathing, no particular change in shortness of breath; doesn't feel short of breath now  Gastrointestinal: No abdominal pain, no nausea, no vomiting, no diarrhea. No particular change in bowel movement pattern, no black stools, no bloody stools  Genitourinary: No particular change in incontinence, no pain with urination, no particular change in stream, no particular change in amount urinated with urge, no discharge  Skin: No particular change in bruising, no rashes  Musculoskeletal: no particular change in strength, no particular change in muscle development  Neurological: no numbness and tingling, no headache, no particular change in balance, no dizziness  Psychologic: No particular change in depression and/or anxiety  Endocrine: No particular change in heat or cold intolerance           Past Medical History    I have reviewed this patient's medical history and updated it with pertinent information if needed.   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     Aortic insufficiency 2015    moderate, aortic sclerosis without stenosis echo 2015      ASCVD (arteriosclerotic cardiovascular disease) 2009    Bare metal stent obtuse margnial     Atherosclerosis of aorta (H) 2012    ECHO 2012     Cystocele 2012     Diastolic dysfunction 2012     GI bleed 2011    EGD-H Pylori, treated, Colonoscopy small Polyp - transfused 3 units of blood.     Hyperlipidemia 2003     Hypertension 1999     Mammogram declined 2012     Meniere's disease, unspecified 2011     Mitral regurgitation 2015    moderate, echo 2015      Osteoarthritis 2000     Sick sinus syndrome (H) 2016    dual chamber pacemaker placement     Squamous cell skin cancer, ala nasi 2006    MOHS Surgery     Transient global amnesia 2004       Past Surgical History   I have reviewed this patient's surgical history and updated it with pertinent information if needed.  Past Surgical History:   Procedure Laterality Date     APPENDECTOMY       ARTHROSCOPY KNEE      RIGHT - Osteoarthritis     C TOTAL KNEE ARTHROPLASTY      LEFT - Osteoarthritis - Ram[facility]rez Ruvalc[facility]     C TOTAL KNEE ARTHROPLASTY      RIGHT - Osteoarthritis - Ramírez Ruvalcbaa     CATARACT EXTRACTION and LENS IMPLANTATION      BILATERAL     COLONOSCOPY  2012     Colonoscopy with Polypectomy      GI BLEED - DR. DEL CASTILLO     ESOPHAGOSCOPY, GASTROSCOPY, DUODENOSCOPY (EGD), COMBINED N/A 2019    Procedure: UPPER ENDOSCOPY WITH BIOPSY;  Surgeon: Jorge Freeman MD;  Location: [facility] OR             REMOVAL OF OVARIAN CYST(S)       HYSTERECTOMY, YANG      Metorrhagia     IMPLANT PACEMAKER  2016     Left Subclavian Line, Triple Lumen      Gastric Ulcer, Dieulfoy lesion, hemostatic clips placed - Massive GI Bleed - [facility]red [facility]      RELEASE CARPAL TUNNEL      RIGHT - Carpal Tunnel Syndrome     REPAIR BLADDER       SIGMOIDOSCOPY FLEXIBLE N/A 9/26/2019    Procedure: FLEXIBLE SIGMOIDOSCOPY;  Surgeon: Jorge Freeman MD;  Location: [facility] OR       Social History   I have reviewed this patient's social history and updated it with pertinent information if needed.  Social History     Tobacco Use     Smoking status: Never Smoker     Smokeless tobacco: Never Used   Substance Use Topics     Alcohol use: No     Alcohol/week: 0.0 standard drinks     Drug use: No       Family History   I have reviewed this patient's family history and updated it with pertinent information if needed.   Family History   Problem Relation Age of Onset     Cancer Brother         Pancreatic     Cancer Brother         Prostate     Cancer Sister         Breast     Hypertension Brother      Hypertension Father      Hypertension Sister      Hypertension Mother      Aneurysm Daughter 68        cerebral, sudden death     Osteoporosis Other      Thyroid Disease No family hx of      Diabetes No family hx of         Prior to Admission Medications   Prior to Admission Medications   Prescriptions Last Dose Informant Patient Reported? Taking?   LORazepam (ATIVAN) 0.5 MG tablet Past Week at Unknown time  No Yes   Sig: TAKE 1 TABLET BY MOUTH EVERY 8 HOURS AS NEEDED FOR ANXIETY   Multiple Vitamins-Minerals (MULTIVITAMIN ADULT PO) 2/19/2020 at Unknown time  Yes Yes   Sig: Take 1 tablet by mouth daily (Patient uses [facility] Multivitamin, Dr. Lucy lakhani)   acetaminophen 500 MG PO tablet 2/18/2020 at Unknown time  Yes Yes   Sig: Take 1,000 mg by mouth as needed   isosorbide mononitrate (IMDUR) 30 MG 24 hr tablet 2/18/2020 at Unknown time Self No Yes   Sig: TAKE ONE TABLET BY MOUTH AT BEDTIME.   nitroGLYcerin (NITROSTAT) 0.4 MG sublingual tablet Unknown at Unknown time Self No No   Sig: Place 1 tablet (0.4mg) by sublingual route at the first sign of attack; may repeat ever 5 min until relief;  "if pain persists after 3 tablets in 15 minutes prompt medical attention is recommended. AS NEEDED   pantoprazole 40 MG PO EC tablet Past Month at Unknown time  Yes Yes   Sig: Take 40 mg by mouth 2 times daily   vitamin C 500 MG PO tablet Past Week at Unknown time  Yes Yes   Sig: Take 500 mg by mouth daily       Facility-Administered Medications: None     Allergies   Allergies   Allergen Reactions     Real Juice Other (See Comments), Nausea and GI Disturbance     Vertigo     Food      Oranges.     Naprosyn [Naproxen]      Incontinence       Niacin Swelling       Physical Exam   Vital Signs: Temp: 97  F (36.1  C) Temp src: Tympanic BP: (!) 208/89 Pulse: 70 Heart Rate: 67 Resp: 16 SpO2: 96 % O2 Device: None (Room air)    Weight: 113 lbs 1.54 oz     EHR reviewed; patient seen in Med/Surg room 3224.    Vital signs:  Temp: 97  F (36.1  C) Temp src: Tympanic BP: (!) 208/89 Pulse: 70 Heart Rate: 67 Resp: 16 SpO2: 96 % O2 Device: None (Room air)   Height: 147.3 cm (4' 10\") Weight: 51.3 kg (113 lb 1.5 oz)  Estimated body mass index is 23.64 kg/m  as calculated from the following:    Height as of this encounter: 1.473 m (4' 10\").    Weight as of this encounter: 51.3 kg (113 lb 1.5 oz).      General: No distress, interactive, eating, comfortably seated with legs crossed, appears younger than stated age  Head: normocephalic, no obvious trauma  Eyes: Gaze directed normally, sclera clear, no discharge, no abnormal ocular movements  Ears: Normal appearing age-related external ears, no discharge, stable hearing acuity loss  Nose: Normal age-related appearance  Mouth: Normal appearing oral mucosa, Gums and throat appear age-related normal  Neck: Normal age-related appearance, age-related range of motion, supple, no adenopathy  Pulmonary: Normal work of breathing, no expiratory delay, no coarseness, no wheezing  Cardiovascular: Distant heart sounds, regular rhythm   Abdomen: No obvious distention, soft, bowel sounds present with " normal frequency and pitch  Rectal: Deferred  Back: Age-related normal  Skin: Age-related normal, no rashes  Extremities: Not tender, no lower extremity edema. Moving upper and lower extremities  Neurological: Grossly in tact  Psychiatric: Mood is stable, appropriately interactive      Data   Data reviewed today: I reviewed all medications, new labs and imaging results over the last 24 hours.

## 2020-02-19 NOTE — TELEPHONE ENCOUNTER
"Pt of..      Patient calling and states she is having a hard time breathing.She has not slept for two days because she cannot breath. She is wondering if she needs oxygen.She does not use O2 and complains of SOB. Advised ED now and to call 911.She states she will call her son. Updated her to call 911 now. She states, \"OK I will\" and hung up.    Called her back to see if she needs assist to call 911.She states no and her son is on the way now. Advised to call 911 if needed.She verbalized understanding.    Muna Huerta RN          Reason for Disposition    Severe difficulty breathing (e.g., struggling for each breath, speaks in single words)    Protocols used: BREATHING DIFFICULTY-A-AH      "

## 2020-02-19 NOTE — ED PROVIDER NOTES
History     Chief Complaint   Patient presents with     Shortness of Breath     states increased SOB that started one hour ago. Reports history of SOB but states this is worse than usual.      HPI  Lauren Barron is a 89 year old female who presents emergency department with complaints of rapid onset shortness of breath and shaky sensation approximately 1 hour prior to arrival.  Denies significant cardiac history however is noted to have aortic insufficiency and mitral regurgitation in her problem list as well as a pacemaker.      Patient states her blood pressures are generally normal and takes nitroprusside at night.  She has been taking her medications as prescribed.  She denies any chest pain.  She has not had any associated dizziness, diaphoresis, nausea.  Patient states that her last primary care visit she had some medication was discontinued.  She is not certain what those medications were.  She denies any recent fever, cough, upper respiratory symptoms.  She is not aware of any problems with kidneys.  She has not had a prior DVT or pulmonary embolism.    Allergies:  Allergies   Allergen Reactions     [place] Juice Other (See Comments), Nausea and GI Disturbance     Vertigo     Food      Oranges.     Naprosyn [Naproxen]      Incontinence       Niacin Swelling       Problem List:    Patient Active Problem List    Diagnosis Date Noted     Acute hyponatremia 10/14/2019     Priority: Medium     Hyponatremia 10/14/2019     Priority: Medium     Duodenal ulcer 10/03/2019     Priority: Medium     Acute GI bleeding 09/25/2019     Priority: Medium     Other emphysema (H) 10/29/2018     Priority: Medium     CKD (chronic kidney disease) stage 3, GFR 30-59 ml/min (H) 11/20/2017     Priority: Medium     Pulmonary nodules 06/08/2017     Priority: Medium     One, left lung, repeat chest CT 6 months, 12/2017       Benign essential hypertension 12/05/2016     Priority: Medium     Splenic artery aneurysm (H) 10/06/2016      Priority: Medium     Pulmonary fibrosis (H) 10/06/2016     Priority: Medium     Hiatal hernia 09/08/2016     Priority: Medium     Sick sinus syndrome (H) 06/14/2016     Priority: Medium     Pacemaker placed 4/2016       Presence of cardiac pacemaker 03/28/2016     Priority: Medium     Overview:   [place] [id number]  SN#[id number]  3/28/2016  Atr lead [place] 4469  SN#[id number]  3/28/2016  Vent lead [place] 4470  SN#[id number]  3/28/2016  Dr Bradley  Bradycardia       Coronary arteriosclerosis in native artery 03/15/2016     Priority: Medium     Osteoarthritis 09/21/2015     Priority: Medium     Aortic insufficiency 06/02/2015     Priority: Medium     moderate, aortic sclerosis without stenosis  echo 5/6/2015       Mitral regurgitation 06/02/2015     Priority: Medium     moderate, echo 5/6/2015       Bradycardia 05/05/2015     Priority: Medium     Due to Metoprolol and amlodipine       Epistaxis, recurrent 05/01/2013     Priority: Medium     Mammogram declined 12/20/2012     Priority: Medium     Epistaxis 10/20/2012     Priority: Medium     Gastrointestinal hemorrhage 10/16/2012     Priority: Medium     Thrombocytopenia (H) 10/16/2012     Priority: Medium     Acute posthemorrhagic anemia 10/15/2012     Priority: Medium     Cystocele 03/22/2012     Priority: Medium     Advance Care Planning 03/16/2012     Priority: Medium     Advance Care Planning 8/31/2015: Receipt of ACP document:  Received: Health Care Directive which was witnessed or notarized on 8/20/15.  Document previously scanned on 8/28/15.  Validation form completed and scanned.  Code Status reflects choices in most recent ACP document.  Confirmed/documented designated decision maker(s).  Added by Kerrie Joaquin RN, BSN, MA, Advance Care Planning Liaison.  Advance Care Planning 8/5/2015: Receipt of ACP document:  Received: POLST which was signed and dated by provider on 7-21-15.  Document previously scanned on 7-22-15.  Order reviewed and found to be valid.  Code  Status reflects choices in most recent ACP document.  Confirmed/documented designated decision maker(s).  Added by Jessica Gregorio RN Advance Care Planning Liaison with [facility]  Advance Care Planning 8/5/2015: Receipt of ACP document:  Received: invalid HCD document dated 2-22-15.  Document not previously scanned. Validation form completed indicating invalid document. Validation form sent to be scanned as notation of invalid document received.  Code Status reflects choices in most recent ACP document.  Confirmed/documented designated decision maker(s).  Added by Jessica Gregorio RN Advance Care Planning Liaison with [facility]       Diastolic dysfunction 03/12/2012     Priority: Medium     Atherosclerosis of aorta (H) 03/12/2012     Priority: Medium     ECHO 2/2012       GI bleed 07/25/2011     Priority: Medium     EGD-H Pylori, treated, Colonoscopy small Polyp - transfused 3 units of blood.       Allergic rhinitis 07/25/2011     Priority: Medium     Problem list name updated by automated process. Provider to review       Meniere's disease 07/25/2011     Priority: Medium     Problem list name updated by automated process. 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 Provider to review       Essential hypertension 12/06/1999     Priority: Medium     Problem list name updated by automated process. Provider to review          Past Medical History:    Past Medical History:   Diagnosis Date     Allergic rhinitis, cause unspecified 07/25/2011     Aneurysm of splenic artery (H) 03/31/2014     Aortic  insufficiency 2015     ASCVD (arteriosclerotic cardiovascular disease) 2009     Atherosclerosis of aorta (H) 2012     Cystocele 2012     Diastolic dysfunction 2012     GI bleed 2011     Hyperlipidemia 2003     Hypertension 1999     Mammogram declined 2012     Meniere's disease, unspecified 2011     Mitral regurgitation 2015     Osteoarthritis 2000     Sick sinus syndrome (H) 2016     Squamous cell skin cancer, ala nasi 2006     Transient global amnesia 2004       Past Surgical History:    Past Surgical History:   Procedure Laterality Date     APPENDECTOMY       ARTHROSCOPY KNEE      RIGHT - Osteoarthritis     C TOTAL KNEE ARTHROPLASTY      LEFT - Osteoarthritis - [facility]     C TOTAL KNEE ARTHROPLASTY      RIGHT - Osteoarthritis - [facility]     CATARACT EXTRACTION and LENS IMPLANTATION      BILATERAL     COLONOSCOPY       Colonoscopy with Polypectomy      GI BLEED - DR. DEL CASTILLO     ESOPHAGOSCOPY, GASTROSCOPY, DUODENOSCOPY (EGD), COMBINED N/A 2019    Procedure: UPPER ENDOSCOPY WITH BIOPSY;  Surgeon: Jorge Freeman MD;  Location: [facility]             REMOVAL OF OVARIAN CYST(S)       HYSTERECTOMY, YANG      Metorrhagia     IMPLANT PACEMAKER  2016     Left Subclavian Line, Triple Lumen      Gastric Ulcer, Dieulfoy lesion, hemostatic clips placed - Massive GI Bleed - Transferred to [facility]     RELEASE CARPAL TUNNEL      RIGHT - Carpal Tunnel Syndrome     REPAIR BLADDER       SIGMOIDOSCOPY FLEXIBLE N/A 2019    Procedure: FLEXIBLE SIGMOIDOSCOPY;  Surgeon: Jorge Freeman MD;  Location: [facility] OR       Family History:    Family History   Problem Relation Age of Onset     Cancer Brother         Pancreatic     Cancer Brother         Prostate     Cancer Sister         Breast     Hypertension Brother      Hypertension Father      Hypertension Sister      Hypertension Mother       Aneurysm Daughter 68        cerebral, sudden death     Osteoporosis Other      Thyroid Disease No family hx of      Diabetes No family hx of        Social History:  Marital Status:   [5]  Social History     Tobacco Use     Smoking status: Never Smoker     Smokeless tobacco: Never Used   Substance Use Topics     Alcohol use: No     Alcohol/week: 0.0 standard drinks     Drug use: No        Medications:    Ascorbic Acid (VITAMIN C) 500 MG CAPS  calcium carbonate (OS-PEARL) 1500 (600 Ca) MG tablet  isosorbide mononitrate (IMDUR) 30 MG 24 hr tablet  LORazepam (ATIVAN) 0.5 MG tablet  Multiple Vitamins-Minerals (MULTIVITAMIN ADULT PO)  acetaminophen (TYLENOL) 500 MG tablet  nitroGLYcerin (NITROSTAT) 0.4 MG sublingual tablet          Review of Systems   Constitutional: Negative for fever.   HENT: Negative.    Eyes: Negative.    Respiratory: Positive for shortness of breath. Negative for cough and chest tightness.    Cardiovascular: Negative for chest pain and leg swelling.   Gastrointestinal: Negative.    Genitourinary: Negative.    Musculoskeletal: Negative.    Skin: Negative.    Neurological: Negative for dizziness.   Psychiatric/Behavioral: Negative for confusion.       Physical Exam   BP: (!) 193/86  Pulse: 72  Heart Rate: 67  Temp: 96.8  F (36  C)  Resp: (!) 40  SpO2: 100 %      Physical Exam  Constitutional:       General: She is not in acute distress.     Appearance: She is well-developed. She is not diaphoretic.   HENT:      Head: Normocephalic and atraumatic.   Eyes:      General: No scleral icterus.  Neck:      Musculoskeletal: Normal range of motion and neck supple.   Cardiovascular:      Rate and Rhythm: Normal rate.      Heart sounds: Heart sounds are distant. Murmur (very mild) present.   Pulmonary:      Effort: Pulmonary effort is normal.      Breath sounds: Normal breath sounds.   Skin:     General: Skin is warm and dry.      Coloration: Skin is not pale.      Findings: No erythema or rash.    Neurological:      Mental Status: She is alert and oriented to person, place, and time.         ED Course        Procedures  And initial concern for possible MI versus PE versus heart failure.  Initial EKG was unremarkable.  Troponin was noted to be slightly elevated at 0.073.  Second troponin was drawn 1 hour later  and was again slightly elevated at 0.079.  I did not think this was a significant elevation to indicate non-STEMI.  Patient's d-dimer was significantly elevated however she was not short of breath on evaluation 1 hour after arrival.  She was denying chest pain.  She did walk in the hallway and had normal O2 sats on room air with no complaints of increased shortness of breath.  I have very low suspicion of pulmonary embolism or pneumonia at this time.  Patient did have dramatically elevated BNP concerning for heart failure.  She denies any significant leg swelling.  Since her arrival in the emergency department her blood pressure had been continually elevating and was noted to be 231/120 at one point.  Patient was given topical nitroglycerin paste 2 inches and 20 mg Lasix.      I spoke to Dr. Reyes, hospitalist who agreed to admit the patient for hypertensive emergency and echocardiogram.               EKG Interpretation:      Interpreted by VICENTE Hair  Time reviewed: 1148  Symptoms at time of EKG: sob   Rhythm: normal sinus   Rate: 64 bpm  Axis: normal  Ectopy: none  Conduction: normal  ST Segments/ T Waves: No ST-T wave changes  Q Waves: none  Comparison to prior: Unchanged from September.      Clinical Impression: normal EKG           Results for orders placed or performed during the hospital encounter of 02/19/20 (from the past 24 hour(s))   CBC with platelets differential   Result Value Ref Range    WBC 5.9 4.0 - 11.0 10e9/L    RBC Count 4.44 3.8 - 5.2 10e12/L    Hemoglobin 10.3 (L) 11.7 - 15.7 g/dL    Hematocrit 33.2 (L) 35.0 - 47.0 %    MCV 75 (L) 78 - 100 fl    MCH 23.2 (L) 26.5 - 33.0  pg    MCHC 31.0 (L) 31.5 - 36.5 g/dL    RDW 20.4 (H) 10.0 - 15.0 %    Platelet Count 296 150 - 450 10e9/L    Diff Method Automated Method     % Neutrophils 67.2 %    % Lymphocytes 20.0 %    % Monocytes 10.3 %    % Eosinophils 1.7 %    % Basophils 0.5 %    % Immature Granulocytes 0.3 %    Nucleated RBCs 0 0 /100    Absolute Neutrophil 4.0 1.6 - 8.3 10e9/L    Absolute Lymphocytes 1.2 0.8 - 5.3 10e9/L    Absolute Monocytes 0.6 0.0 - 1.3 10e9/L    Absolute Eosinophils 0.1 0.0 - 0.7 10e9/L    Absolute Basophils 0.0 0.0 - 0.2 10e9/L    Abs Immature Granulocytes 0.0 0 - 0.4 10e9/L    Absolute Nucleated RBC 0.0    D dimer quantitative   Result Value Ref Range    D Dimer 1.4 (H) 0.0 - 0.50 ug/ml FEU   Basic metabolic panel   Result Value Ref Range    Sodium 138 133 - 144 mmol/L    Potassium 3.8 3.4 - 5.3 mmol/L    Chloride 105 94 - 109 mmol/L    Carbon Dioxide 25 20 - 32 mmol/L    Anion Gap 8 3 - 14 mmol/L    Glucose 91 70 - 99 mg/dL    Urea Nitrogen 24 7 - 30 mg/dL    Creatinine 0.85 0.52 - 1.04 mg/dL    GFR Estimate 59 (L) >60 mL/min/[1.73_m2]    GFR Estimate If Black 68 >60 mL/min/[1.73_m2]    Calcium 8.8 8.5 - 10.1 mg/dL   Troponin I   Result Value Ref Range    Troponin I ES 0.073 (H) 0.000 - 0.045 ug/L   Nt probnp inpatient (BNP)   Result Value Ref Range    N-Terminal Pro BNP Inpatient 7,573 (H) 0 - 1,800 pg/mL   XR Chest 2 Views    Narrative    PROCEDURE:  XR CHEST 2 VW    HISTORY:  sob, rapid onset.     COMPARISON:  2016    FINDINGS:   The heart is enlarged is a transvenous pacemaker in place. The  pulmonary vasculature is normal.  Is elevation of the right  hemidiaphragm with right basilar atelectasis or pneumonia. There is  interstitial thickening seen in both lung bases which is stable from  previous examination No pleural effusion or pneumothorax.      Impression    IMPRESSION:  Elevated right hemidiaphragm with right basilar  atelectasis or pneumonia.      GALLITO HENRY MD   Troponin I   Result Value Ref Range     Troponin I ES 0.079 (H) 0.000 - 0.045 ug/L       Medications   nitroGLYcerin (NITRO-BID) 2 % ointment 30 mg (30 mg Transdermal Patch/Med Applied 2/19/20 1414)   furosemide (LASIX) injection 20 mg (20 mg Intravenous Given 2/19/20 1414)       Assessments & Plan (with Medical Decision Making)     I have reviewed the nursing notes.    I have reviewed the findings, diagnosis, plan and need for follow up with the patient.    New Prescriptions    No medications on file       Final diagnoses:   Hypertensive emergency   Congestive heart failure, unspecified HF chronicity, unspecified heart failure type (H)     VICENTE Hair on 2/19/2020 at 2:56 PM   2/19/2020   [facility]     Azar Quinones PA  02/19/20 0693

## 2020-02-19 NOTE — ED NOTES
"Pt to the ED with son with report of sudden onset of SOB about two hours ago. Denies any resp s/sx.  States \"had this happen before and it's been a long time\". Does not wear oxygen.  Denies pain. Pt was placed on monitors.  Call light is given. IV start with blood draw and sent to lab. Assessment is complete.    "

## 2020-02-19 NOTE — ED NOTES
Nurse to nurse report given to Carlene COLLADO on 3rd floor and pt is transferring to 3rd floor at this time. IV site is WDL.  Denies pain.  BP remains elevated.  VS as charted. Son at side. Personal belongings with pt.

## 2020-02-20 ENCOUNTER — HOSPITAL ENCOUNTER (INPATIENT)
Dept: CARDIOLOGY | Facility: HOSPITAL | Age: 85
DRG: 683 | End: 2020-02-20
Attending: INTERNAL MEDICINE
Payer: COMMERCIAL

## 2020-02-20 VITALS
HEART RATE: 69 BPM | BODY MASS INDEX: 23.74 KG/M2 | TEMPERATURE: 96.9 F | WEIGHT: 113.1 LBS | HEIGHT: 58 IN | DIASTOLIC BLOOD PRESSURE: 73 MMHG | RESPIRATION RATE: 16 BRPM | OXYGEN SATURATION: 97 % | SYSTOLIC BLOOD PRESSURE: 157 MMHG

## 2020-02-20 LAB
ANION GAP SERPL CALCULATED.3IONS-SCNC: 5 MMOL/L (ref 3–14)
BACTERIA SPEC CULT: NORMAL
BUN SERPL-MCNC: 29 MG/DL (ref 7–30)
CALCIUM SERPL-MCNC: 8.7 MG/DL (ref 8.5–10.1)
CHLORIDE SERPL-SCNC: 102 MMOL/L (ref 94–109)
CO2 SERPL-SCNC: 28 MMOL/L (ref 20–32)
CREAT SERPL-MCNC: 0.89 MG/DL (ref 0.52–1.04)
ERYTHROCYTE [DISTWIDTH] IN BLOOD BY AUTOMATED COUNT: 19.8 % (ref 10–15)
GFR SERPL CREATININE-BSD FRML MDRD: 55 ML/MIN/{1.73_M2}
GLUCOSE SERPL-MCNC: 87 MG/DL (ref 70–99)
HCT VFR BLD AUTO: 30.5 % (ref 35–47)
HGB BLD-MCNC: 10 G/DL (ref 11.7–15.7)
MCH RBC QN AUTO: 23.7 PG (ref 26.5–33)
MCHC RBC AUTO-ENTMCNC: 32.8 G/DL (ref 31.5–36.5)
MCV RBC AUTO: 72 FL (ref 78–100)
PLATELET # BLD AUTO: 316 10E9/L (ref 150–450)
POTASSIUM SERPL-SCNC: 4 MMOL/L (ref 3.4–5.3)
RBC # BLD AUTO: 4.22 10E12/L (ref 3.8–5.2)
SODIUM SERPL-SCNC: 135 MMOL/L (ref 133–144)
SPECIMEN SOURCE: NORMAL
TROPONIN I SERPL-MCNC: 0.07 UG/L (ref 0–0.04)
TROPONIN I SERPL-MCNC: 0.09 UG/L (ref 0–0.04)
WBC # BLD AUTO: 6.8 10E9/L (ref 4–11)

## 2020-02-20 PROCEDURE — 99238 HOSP IP/OBS DSCHRG MGMT 30/<: CPT | Performed by: INTERNAL MEDICINE

## 2020-02-20 PROCEDURE — 36415 COLL VENOUS BLD VENIPUNCTURE: CPT | Performed by: INTERNAL MEDICINE

## 2020-02-20 PROCEDURE — 85027 COMPLETE CBC AUTOMATED: CPT | Performed by: INTERNAL MEDICINE

## 2020-02-20 PROCEDURE — 93306 TTE W/DOPPLER COMPLETE: CPT | Mod: 26 | Performed by: INTERNAL MEDICINE

## 2020-02-20 PROCEDURE — 93306 TTE W/DOPPLER COMPLETE: CPT | Mod: TC

## 2020-02-20 PROCEDURE — 25000132 ZZH RX MED GY IP 250 OP 250 PS 637: Performed by: INTERNAL MEDICINE

## 2020-02-20 PROCEDURE — 84484 ASSAY OF TROPONIN QUANT: CPT | Performed by: INTERNAL MEDICINE

## 2020-02-20 PROCEDURE — 80048 BASIC METABOLIC PNL TOTAL CA: CPT | Performed by: INTERNAL MEDICINE

## 2020-02-20 RX ORDER — METOPROLOL TARTRATE 50 MG
50 TABLET ORAL 2 TIMES DAILY
Qty: 30 TABLET | Refills: 0 | Status: SHIPPED | OUTPATIENT
Start: 2020-02-20 | End: 2020-06-15

## 2020-02-20 RX ORDER — AMLODIPINE BESYLATE 10 MG/1
10 TABLET ORAL DAILY
Status: DISCONTINUED | OUTPATIENT
Start: 2020-02-21 | End: 2020-02-20 | Stop reason: HOSPADM

## 2020-02-20 RX ORDER — AZITHROMYCIN 250 MG/1
TABLET, FILM COATED ORAL
Qty: 11 TABLET | Refills: 0 | Status: SHIPPED | OUTPATIENT
Start: 2020-02-20 | End: 2020-06-05

## 2020-02-20 RX ORDER — ISOSORBIDE MONONITRATE 30 MG/1
60 TABLET, EXTENDED RELEASE ORAL AT BEDTIME
Status: DISCONTINUED | OUTPATIENT
Start: 2020-02-20 | End: 2020-02-20 | Stop reason: HOSPADM

## 2020-02-20 RX ADMIN — METOPROLOL TARTRATE 50 MG: 50 TABLET, FILM COATED ORAL at 08:50

## 2020-02-20 RX ADMIN — SENNOSIDES AND DOCUSATE SODIUM 1 TABLET: 8.6; 5 TABLET ORAL at 11:29

## 2020-02-20 RX ADMIN — AMLODIPINE BESYLATE 5 MG: 5 TABLET ORAL at 08:50

## 2020-02-20 RX ADMIN — FUROSEMIDE 40 MG: 40 TABLET ORAL at 08:50

## 2020-02-20 RX ADMIN — PANTOPRAZOLE SODIUM 40 MG: 40 TABLET, DELAYED RELEASE ORAL at 08:50

## 2020-02-20 ASSESSMENT — ACTIVITIES OF DAILY LIVING (ADL)
ADLS_ACUITY_SCORE: 14

## 2020-02-20 NOTE — PLAN OF CARE
Pt discussed in morning care rounds today. Per nursing, pt is independent. No concerns requiring OT. Will complete OT order.

## 2020-02-20 NOTE — PLAN OF CARE
Pt had run of vtach lasting roughly 33 seconds. Responded to pt and pt was alert with no symptoms, VSS upon checking. Per other ICU nurse pt flipped back to SR when I made it into the room.       Dr. Zuniga notified of this event.

## 2020-02-20 NOTE — DISCHARGE SUMMARY
Admit Date:     02/19/2020   Discharge Date:           PRIMARY CARE PHYSICIAN:  Vandana Jones MD      ADMISSION DIAGNOSIS:  Shortness of breath.      DISCHARGE DIAGNOSES:   1.  Shortness of breath, etiology unclear, as there was some consideration she may have underlying congestive heart failure exacerbation.  Echo is pending at time of discharge.  In addition, chest x-ray did reveal elevated diaphragm with possible infiltrate.  Regardless, with administration of Lasix, she has no complaints of shortness of breath today.   2.  Hypertension.   3.  History of aortic insufficiency.   4.  History of diastolic dysfunction.   5.  Hyperlipidemia.   6.  Hypertension.      PENDING TEST RESULTS AT TIME OF DISCHARGE:  Echocardiogram is pending at time of discharge.      DISPOSITION:  To home, self-care.      PROCEDURES/CONSULTATIONS:  Chest x-ray and echocardiogram along with telemetry.      HISTORY OF PRESENTING ILLNESS:  Please see admission H and P.      PAST MEDICAL HISTORY:  Please see admission H and P.      HOSPITAL COURSE:  Ms. Barron is a 89-year-old lady who presented to the Emergency Department last night due to some abrupt shortness of breath.  She was seen in the Emergency Department where she underwent routine evaluation with lab work and chest x-ray along with EKG.  Overall, labs were unremarkable and/or stable; however, she did have a slight elevation in her troponin at 0.079 and elevation in her BNP at 7573.  Her troponin subsequently peaked at 0.09 and then dropped to 0.07.  During this hospitalization, however, the patient had no complaints of chest pain.  She was given a dose of Lasix during this hospitalization; however, she did not net any negative loss and, in fact, at time of discharge, she is +720 mL.      As stated above, the patient does have some subtle cardiac history with aortic insufficiency, stent placement to the obtuse marginal, and some diastolic dysfunction; however, clinically, she does  not appear to have any overt heart failure.  As stated, echocardiogram is still pending.      In addition, she did undergo a chest x-ray, which did reveal elevated right diaphragm and possible atelectasis versus infiltrate.  Hence, at time of discharge, she will be given a 10-day course of azithromycin to cover for any underlying infectious process.      Nevertheless, Ms. Barron is stable today and will be discharged home as she does want to attend to her dying brother.  Her vitals have been stable; however, she has had a slight elevation in her blood pressure, hence beta blocker was started during this hospitalization and blood pressure should be reassessed as an outpatient.      Overall, there is no convincing evidence as to overt heart failure nor infection; however, those possibilities were considered.  In addition, with her hypertension, this can be managed on an outpatient basis with her primary care provider.      PHYSICAL EXAMINATION AT TIME OF DISCHARGE:   VITAL SIGNS:  Blood pressure is 157/73, respirations 16, O2 sat 97% on room air.  Pulse is 94, temperature 96.9.   GENERAL:  The patient is alert and oriented, pleasant, conversant, no acute distress.   HEART:  Regular rate and rhythm.  Soft systolic murmur was present.   LUNGS:  Clear to auscultation.  I did not appreciate any crackles, wheezes or rhonchi.   ABDOMEN:  Soft.   EXTREMITIES:  Without any edema.   NEUROLOGIC:  No focal neurologic deficits noted.        INSTRUCTIONS AT TIME OF DISCHARGE:     CODE STATUS:  DNR/DNI.      ACTIVITY:  As tolerated.      DIET:  Regular.      MEDICATIONS AT TIME OF DISCHARGE:  Include:   1.  Azithromycin 500 mg on day 1 and 250 mg on day 2-10.   2.  Lopressor 50 mg p.o. daily.   3.  Imdur 30 mg daily.   4  Protonix 40 mg daily.   5.  Senna daily.      FOLLOWUP APPOINTMENTS AND REFERRALS:  The patient should follow up with Dr. Vandana Jones as scheduled on 02/27.  At this followup, review of her echocardiogram  will be needed, consideration for repeat labs, and assessment for any possibility of heart failure.  In addition, she was placed on metoprolol, and that will need to be assessed as to titrating that if needed or potentially discontinuing it.         MONIK BEAVERS DO             D: 2020   T: 2020   MT: NORMA      Name:     PEDRO JEREZ   MRN:      [id number]        Account:        [id number]   :      [id number]           Admit Date:     2020                                  Discharge Date:       Document: [id number]       cc: Vandana Jones MD

## 2020-02-20 NOTE — PROGRESS NOTES
Had a short run of V-tach; asymptomatic. Medical management started for CHF + ECHO today, but cardiac medications for structural heart disease might not be a positive for this 89 year-old

## 2020-02-20 NOTE — PLAN OF CARE
"/75   Pulse 69   Temp 97  F (36.1  C) (Tympanic)   Resp 16   Ht 1.473 m (4' 10\")   Wt 51.3 kg (113 lb 1.5 oz)   SpO2 95%   BMI 23.64 kg/m   room air    Patient denies pain is alert and oriented x4 and resting well in room - denies chest pain or SOB - education done to reinforce changing positions slowly and to dangle feet at bedside to prevent dizziness, pt denies any episodes of dizziness at this time - light is on in patient bathroom to help guide her during the night and paths are cleared for her - patient using a cane from home     ICU tele reading large run of v-tach for 33 seconds rate in 120s - patient nonsymptomatic and checked by ICU nurse - otherwise SR 60s    Face to face report given with opportunity to observe patient.    Report given to Dano Borges   2/20/2020  5:36 AM      "

## 2020-02-20 NOTE — PLAN OF CARE
[facility] Inpatient Admission Note:      Inpatient Nursing criteria listed below was met:    Health care directives status obtained and documented: Yes patient states what is in the computer is what she wants    Care Everywhere authorization obtained No      Patient identifies a surrogate decision maker: Yes If yes, who:see emergency contact list Contact Information:see sheet      If initial lactic acid >2.0, repeat lactic acid drawn within one hour of arrival to unit: NA. If no, state reason: na    Vaccination assessment and education completed: Yes   Vaccinations received prior to admission: Pneumovax yes  Influenza(seasonal)  YES   Vaccination(s) ordered: not given today because NA    Clergy visit ordered if patient requests: No    Skin issues/needs documented: No    Isolation Patient: no Education given, correct sign in place and documentation row added to PCS:  No    Fall Prevention Yes: Care plan updated, education given and documented, sticker and magnet in place: Yes    Care Plan initiated: Yes    Education Documented (including assessment): Yes    Patient has discharge needs : No If yes, please explain:

## 2020-02-20 NOTE — PLAN OF CARE
"Reason for hospital stay:  chest pain - elevated BP   Living situation PTA: [place] independent   Most recent vitals: BP (!) 208/89   Pulse 70   Temp 97  F (36.1  C) (Tympanic)   Resp 16   Ht 1.473 m (4' 10\")   Wt 51.3 kg (113 lb 1.5 oz)   SpO2 96%   BMI 23.64 kg/m      Pain Management:  denies pain   LOC:  oriented x4  Cardiac:  HR reg apical - elevated BP   Respiratory:  clear bilat   GI:  abd flat nontender soft - BS active x4  :  voids spontaneously   Skin Issues:  clear and intact     IVF:  saline locked     Nutrition: 2 gram sodium   ADL's:  independent   Ambulation:independent steady gait - educated on dangling feet and changing positions slowly as we are trying to lower her BP and this could make her dizzy, patient in agreement with this education   Safety:  call light in reach - bed lowest and locked position - sticky socks - cleared paths - light on in bathroom         "

## 2020-02-20 NOTE — PROGRESS NOTES
"Assessment completed see flowsheet.    LOC: alert and oriented, very pleasant and conversational  Others present: Patient alone    Dx: acute CHF  Chronic Disease Management: HTN, OA    Lives with: alone  Living at:  Her apartment at the [place] [facility]  Transportation: YES She no longer drives, gets rides with family and friends; adds \"but I don't go out much\"    Primary PCP: Vandana Jones  Insurance:  [employer] Medicare IM letter reviewed with her this morning.    Support System:  Family and friends  Homecare/PCA: none  /County Services:   none  [place]: NO      How was the VA notification completed: na    Health Care Directive: YES on file  Guardian: no  POA: not asked    Pharmacy: Meet  Meds management: YES she is able to set up and take meds correctly, independently; her daughter also will set them up for her often    Adequate Resources for needs (housing, utilities, food/med): YES  Household chores: independent also has family help  Work/community/social activity: YES as desired    ADLs: independent  Ambulation:independent, uses a cane  Falls: none  Nutrition: family brings in homemade foods for her or will shop with/for her; she can make her own meals  Sleep: not asked    Equipment used: none other      Oxygen supplier: na      Does the supplier have valid oxygen orders: na    Mental health: denies concern  Substance abuse: denies use  Exposure to violence/abuse: denies  Stressors: she talked briefly about her youngest brother who is on Hospice care and dying; she hopes to discharge so she can see him one more time. Says she had 4 brothers and all 4 have/are  from cancer.     Able to Return to Prior Living Arrangements: YES    Choice of Vendor: na    Barriers: none known    JOSEFA: low    Plan: she plans to return home via a ride with family.       "

## 2020-02-20 NOTE — PLAN OF CARE
Pt is A&O, cardiac diet, independent.  VS as charted, afebrile, on room air and denies pain.  Lungs are clear, HRR. Bowels active x4, passing flatus and urinating adequately. Tolerating diet, denies N/V. No edema noted.  Per ICU tele report pt is SR 60 prolonged QT. IV is SL, brakes locked, call light within reach. Frequent rounding done, free from falls.      Patient discharged at 2:04 PM via wheel chair accompanied by daughter and staff. Prescriptions sent to patients preferred pharmacy. All belongings sent with patient.     Discharge instructions reviewed with pt/daughter. Listed belongings gathered and returned to patient. yes    Patient discharged to [facility].   Report called to N/A    Core Measures and Vaccines  Core Measures applicable during stay: Yes. If yes, state diagnosis: Heart Failure  Pneumonia and Influenza given prior to discharge, if indicated: No    Surgical Patient   Surgical Procedures during stay: no  Did patient receive discharge instruction on wound care and recognition of infection symptoms? N/A    MISC  Follow up appointment made:  Yes  Home and hospital aquired medications returned to patient: N/A  Patient reports pain was well managed at discharge: Yes

## 2020-02-20 NOTE — PLAN OF CARE
Assumed care of pt at 0530, pt appears to be resting in comfort in bed.     Face to face report given with opportunity to observe patient.    Report given to Carlene Pantoja RN   2/20/2020  7:11 AM

## 2020-02-20 NOTE — PLAN OF CARE
Per nursing, pt independent with all functional mobility in room, no safety or mobility concerns.  Will defer PT eval at this time

## 2020-02-21 NOTE — PROGRESS NOTES
Name: Lauren Barron    MRN#: [id number]    Reason for Hospitalization: Hypertensive emergency [I16.1]  Congestive heart failure, unspecified HF chronicity, unspecified heart failure type (H) [I50.9]    JOSEFA: low    Discharge Date: 2/20/2020    Medicare IM letter reviewed with her yesterday    Patient / Family response to discharge plan: she understands and agrees    Follow-Up Appt:   Future Appointments   Date Time Provider Department Center   2/27/2020 11:15 AM Vandana Jones MD [facility]   5/26/2020  2:15 PM Vandana Jones MD [facility]       Other Providers (Care Coordinator, County Services, PCA services etc): No    Discharge Disposition: home via family      Yumiko Ortez CM RN

## 2020-03-03 ENCOUNTER — TELEPHONE (OUTPATIENT)
Dept: FAMILY MEDICINE | Facility: OTHER | Age: 85
End: 2020-03-03

## 2020-03-03 NOTE — TELEPHONE ENCOUNTER
Patient stated she is doing fine and not having any problems. She did not have a ride to make it to her appointment today at 4pm. She does not know if she will be able to find a ride in within the next week but stated she will ask her daughter and see what they can do.

## 2020-03-04 DIAGNOSIS — F41.9 ANXIETY: ICD-10-CM

## 2020-03-06 RX ORDER — LORAZEPAM 0.5 MG/1
TABLET ORAL
Qty: 30 TABLET | Refills: 0 | Status: SHIPPED | OUTPATIENT
Start: 2020-03-06 | End: 2020-05-22

## 2020-03-06 NOTE — TELEPHONE ENCOUNTER
lorazepam      Last Written Prescription Date:  9/29/19  Last Fill Quantity: 30,   # refills: 0  Last Office Visit: 11/25/19  Future Office visit:    Next 5 appointments (look out 90 days)    May 26, 2020  2:15 PM CDT  (Arrive by 2:00 PM)  SHORT with Vandana Jones MD  [facility] [address]  [place] [address]  [phone]           Routing refill request to provider for review/approval because:  Drug not on the [facility], P or [facility] refill protocol or controlled substance

## 2020-03-14 ENCOUNTER — HOSPITAL ENCOUNTER (EMERGENCY)
Facility: HOSPITAL | Age: 85
Discharge: HOME | End: 2020-03-15
Attending: PHYSICIAN ASSISTANT | Admitting: PHYSICIAN ASSISTANT
Payer: COMMERCIAL

## 2020-03-14 ENCOUNTER — APPOINTMENT (OUTPATIENT)
Dept: GENERAL RADIOLOGY | Facility: HOSPITAL | Age: 85
End: 2020-03-14
Attending: PHYSICIAN ASSISTANT
Payer: COMMERCIAL

## 2020-03-14 DIAGNOSIS — I50.31 ACUTE DIASTOLIC CONGESTIVE HEART FAILURE (H): ICD-10-CM

## 2020-03-14 LAB
ANION GAP SERPL CALCULATED.3IONS-SCNC: 8 MMOL/L (ref 3–14)
BASOPHILS # BLD AUTO: 0 10E9/L (ref 0–0.2)
BASOPHILS NFR BLD AUTO: 0.4 %
BUN SERPL-MCNC: 31 MG/DL (ref 7–30)
CALCIUM SERPL-MCNC: 8.7 MG/DL (ref 8.5–10.1)
CHLORIDE SERPL-SCNC: 104 MMOL/L (ref 94–109)
CO2 SERPL-SCNC: 25 MMOL/L (ref 20–32)
CREAT SERPL-MCNC: 0.95 MG/DL (ref 0.52–1.04)
DIFFERENTIAL METHOD BLD: ABNORMAL
EOSINOPHIL # BLD AUTO: 0.1 10E9/L (ref 0–0.7)
EOSINOPHIL NFR BLD AUTO: 1.6 %
ERYTHROCYTE [DISTWIDTH] IN BLOOD BY AUTOMATED COUNT: 19.2 % (ref 10–15)
GFR SERPL CREATININE-BSD FRML MDRD: 51 ML/MIN/{1.73_M2}
GLUCOSE SERPL-MCNC: 120 MG/DL (ref 70–99)
HCT VFR BLD AUTO: 30.8 % (ref 35–47)
HGB BLD-MCNC: 9.8 G/DL (ref 11.7–15.7)
IMM GRANULOCYTES # BLD: 0 10E9/L (ref 0–0.4)
IMM GRANULOCYTES NFR BLD: 0.4 %
LACTATE BLD-SCNC: 1.2 MMOL/L (ref 0.7–2)
LYMPHOCYTES # BLD AUTO: 1.1 10E9/L (ref 0.8–5.3)
LYMPHOCYTES NFR BLD AUTO: 15 %
MCH RBC QN AUTO: 23.7 PG (ref 26.5–33)
MCHC RBC AUTO-ENTMCNC: 31.8 G/DL (ref 31.5–36.5)
MCV RBC AUTO: 74 FL (ref 78–100)
MONOCYTES # BLD AUTO: 0.7 10E9/L (ref 0–1.3)
MONOCYTES NFR BLD AUTO: 8.8 %
NEUTROPHILS # BLD AUTO: 5.6 10E9/L (ref 1.6–8.3)
NEUTROPHILS NFR BLD AUTO: 73.8 %
NRBC # BLD AUTO: 0 10*3/UL
NRBC BLD AUTO-RTO: 0 /100
NT-PROBNP SERPL-MCNC: 7988 PG/ML (ref 0–1800)
PLATELET # BLD AUTO: 296 10E9/L (ref 150–450)
POTASSIUM SERPL-SCNC: 4.1 MMOL/L (ref 3.4–5.3)
PROCALCITONIN SERPL-MCNC: 0.05 NG/ML
RBC # BLD AUTO: 4.14 10E12/L (ref 3.8–5.2)
SODIUM SERPL-SCNC: 137 MMOL/L (ref 133–144)
TROPONIN I SERPL-MCNC: 0.07 UG/L (ref 0–0.04)
WBC # BLD AUTO: 7.6 10E9/L (ref 4–11)

## 2020-03-14 PROCEDURE — 25000128 H RX IP 250 OP 636: Performed by: PHYSICIAN ASSISTANT

## 2020-03-14 PROCEDURE — 99284 EMERGENCY DEPT VISIT MOD MDM: CPT | Mod: 25

## 2020-03-14 PROCEDURE — 80048 BASIC METABOLIC PNL TOTAL CA: CPT | Performed by: INTERNAL MEDICINE

## 2020-03-14 PROCEDURE — 85025 COMPLETE CBC W/AUTO DIFF WBC: CPT | Performed by: INTERNAL MEDICINE

## 2020-03-14 PROCEDURE — 84484 ASSAY OF TROPONIN QUANT: CPT | Performed by: INTERNAL MEDICINE

## 2020-03-14 PROCEDURE — 96376 TX/PRO/DX INJ SAME DRUG ADON: CPT

## 2020-03-14 PROCEDURE — 96375 TX/PRO/DX INJ NEW DRUG ADDON: CPT

## 2020-03-14 PROCEDURE — 96374 THER/PROPH/DIAG INJ IV PUSH: CPT

## 2020-03-14 PROCEDURE — 99284 EMERGENCY DEPT VISIT MOD MDM: CPT | Mod: Z6 | Performed by: PHYSICIAN ASSISTANT

## 2020-03-14 PROCEDURE — 83880 ASSAY OF NATRIURETIC PEPTIDE: CPT | Performed by: INTERNAL MEDICINE

## 2020-03-14 PROCEDURE — 71046 X-RAY EXAM CHEST 2 VIEWS: CPT | Mod: TC

## 2020-03-14 PROCEDURE — 36415 COLL VENOUS BLD VENIPUNCTURE: CPT | Performed by: INTERNAL MEDICINE

## 2020-03-14 PROCEDURE — 83605 ASSAY OF LACTIC ACID: CPT | Performed by: INTERNAL MEDICINE

## 2020-03-14 PROCEDURE — 25000128 H RX IP 250 OP 636: Performed by: INTERNAL MEDICINE

## 2020-03-14 PROCEDURE — 84145 PROCALCITONIN (PCT): CPT | Performed by: PHYSICIAN ASSISTANT

## 2020-03-14 RX ORDER — FUROSEMIDE 10 MG/ML
20 INJECTION INTRAMUSCULAR; INTRAVENOUS ONCE
Status: COMPLETED | OUTPATIENT
Start: 2020-03-14 | End: 2020-03-14

## 2020-03-14 RX ORDER — LABETALOL 20 MG/4 ML (5 MG/ML) INTRAVENOUS SYRINGE
10 ONCE
Status: COMPLETED | OUTPATIENT
Start: 2020-03-14 | End: 2020-03-14

## 2020-03-14 RX ORDER — FUROSEMIDE 10 MG/ML
40 INJECTION INTRAMUSCULAR; INTRAVENOUS ONCE
Status: DISCONTINUED | OUTPATIENT
Start: 2020-03-14 | End: 2020-03-14

## 2020-03-14 RX ADMIN — FUROSEMIDE 20 MG: 10 INJECTION, SOLUTION INTRAMUSCULAR; INTRAVENOUS at 22:43

## 2020-03-14 RX ADMIN — LABETALOL 20 MG/4 ML (5 MG/ML) INTRAVENOUS SYRINGE 10 MG: at 22:33

## 2020-03-14 RX ADMIN — FUROSEMIDE 20 MG: 10 INJECTION, SOLUTION INTRAMUSCULAR; INTRAVENOUS at 23:54

## 2020-03-14 ASSESSMENT — ENCOUNTER SYMPTOMS
NEUROLOGICAL NEGATIVE: 1
GASTROINTESTINAL NEGATIVE: 1
SHORTNESS OF BREATH: 1
CHILLS: 0
FATIGUE: 1
COUGH: 0
MUSCULOSKELETAL NEGATIVE: 1
FEVER: 1
EYES NEGATIVE: 1

## 2020-03-14 NOTE — ED AVS SNAPSHOT
[facility]  [address] [address]  Phone:  [phone]                                    Lauren Barron   MRN: [id number]    Department:  [facility]   Date of Visit:  3/14/2020           After Visit Summary Signature Page    I have received my discharge instructions, and my questions have been answered. I have discussed any challenges I see with this plan with the nurse or doctor.    ..........................................................................................................................................  Patient/Patient Representative Signature      ..........................................................................................................................................  Patient Representative Print Name and Relationship to Patient    ..................................................               ................................................  Date                                   Time    ..........................................................................................................................................  Reviewed by Signature/Title    ...................................................              ..............................................  Date                                               Time          22EPIC Rev 08/18

## 2020-03-15 VITALS
TEMPERATURE: 97 F | OXYGEN SATURATION: 95 % | HEART RATE: 61 BPM | DIASTOLIC BLOOD PRESSURE: 91 MMHG | SYSTOLIC BLOOD PRESSURE: 133 MMHG | RESPIRATION RATE: 20 BRPM

## 2020-03-15 LAB — TROPONIN I SERPL-MCNC: 0.09 UG/L (ref 0–0.04)

## 2020-03-15 PROCEDURE — 25000128 H RX IP 250 OP 636: Performed by: INTERNAL MEDICINE

## 2020-03-15 PROCEDURE — 84484 ASSAY OF TROPONIN QUANT: CPT | Performed by: INTERNAL MEDICINE

## 2020-03-15 PROCEDURE — 36415 COLL VENOUS BLD VENIPUNCTURE: CPT | Performed by: INTERNAL MEDICINE

## 2020-03-15 PROCEDURE — 96376 TX/PRO/DX INJ SAME DRUG ADON: CPT

## 2020-03-15 RX ORDER — LABETALOL 20 MG/4 ML (5 MG/ML) INTRAVENOUS SYRINGE
20 ONCE
Status: COMPLETED | OUTPATIENT
Start: 2020-03-15 | End: 2020-03-15

## 2020-03-15 RX ORDER — FUROSEMIDE 20 MG
20 TABLET ORAL DAILY
Qty: 7 TABLET | Refills: 0 | Status: SHIPPED | OUTPATIENT
Start: 2020-03-15 | End: 2020-03-16

## 2020-03-15 RX ADMIN — LABETALOL 20 MG/4 ML (5 MG/ML) INTRAVENOUS SYRINGE 20 MG: at 01:17

## 2020-03-15 NOTE — ED TRIAGE NOTES
Patient states starting this morning around 10 she began feeling SOB and states that has gotten worse over the day. Satpatrice she was able to walk around this morning but can't now.

## 2020-03-15 NOTE — ED NOTES
Family remains in room.  Pt cautioned about standing unaided.  Will call for assistance when needed.

## 2020-03-15 NOTE — ED PROVIDER NOTES
History     Chief Complaint   Patient presents with     Shortness of Breath     HPI  Lauren Barron is a 89 year old female who presents emergency department with her daughter with complaints of worsening shortness of breath over the past week.  Patient was admitted to the hospital approximately 1 month ago for similar symptoms and at that time was diagnosed with possible pneumonia and treated with 10 days of azithromycin.  She was also observed for likely CHF with significantly elevated BNP and elevated troponin.  She had been given Lasix at that time and noted improvement the following day and was discharged.    Typically she is able to walk around her home without difficulty however in the past week she has had difficulty walking even short distances.  She denies recent cough, fever, upper respiratory congestion, nausea, chest pain, abdominal pain, lower extremity edema.    Allergies:  Allergies   Allergen Reactions     [place] Juice Other (See Comments), Nausea and GI Disturbance     Vertigo     Food      Oranges.     Naprosyn [Naproxen]      Incontinence       Niacin Swelling       Problem List:    Patient Active Problem List    Diagnosis Date Noted     Acute CHF (congestive heart failure) (H) 02/19/2020     Priority: Medium     Acute hyponatremia 10/14/2019     Priority: Medium     Hyponatremia 10/14/2019     Priority: Medium     Duodenal ulcer 10/03/2019     Priority: Medium     Acute GI bleeding 09/25/2019     Priority: Medium     Other emphysema (H) 10/29/2018     Priority: Medium     CKD (chronic kidney disease) stage 3, GFR 30-59 ml/min (H) 11/20/2017     Priority: Medium     Pulmonary nodules 06/08/2017     Priority: Medium     One, left lung, repeat chest CT 6 months, 12/2017       Benign essential hypertension 12/05/2016     Priority: Medium     Splenic artery aneurysm (H) 10/06/2016     Priority: Medium     Pulmonary fibrosis (H) 10/06/2016     Priority: Medium     Hiatal hernia 09/08/2016      Priority: Medium     Sick sinus syndrome (H) 06/14/2016     Priority: Medium     Pacemaker placed 4/2016       Presence of cardiac pacemaker 03/28/2016     Priority: Medium     Overview:   Courtenay Accolade [id number]  #[id number]  3/28/2016  Atr lead [address] 4469  SN#[id number]  3/28/2016  Vent lead [address] 4470  SN#[id number]  3/28/2016  Dr Bradley  Bradycardia       Coronary arteriosclerosis in native artery 03/15/2016     Priority: Medium     Osteoarthritis 09/21/2015     Priority: Medium     Moderate aortic insufficiency 06/02/2015     Priority: Medium     moderate, aortic sclerosis without stenosis  echo 5/6/2015       Moderate mitral insufficiency 06/02/2015     Priority: Medium     moderate, echo 5/6/2015       Bradycardia 05/05/2015     Priority: Medium     Due to Metoprolol and amlodipine       Epistaxis, recurrent 05/01/2013     Priority: Medium     Mammogram declined 12/20/2012     Priority: Medium     Epistaxis 10/20/2012     Priority: Medium     Gastrointestinal hemorrhage 10/16/2012     Priority: Medium     Thrombocytopenia (H) 10/16/2012     Priority: Medium     Acute posthemorrhagic anemia 10/15/2012     Priority: Medium     Cystocele 03/22/2012     Priority: Medium     Advance Care Planning 03/16/2012     Priority: Medium     Advance Care Planning 8/31/2015: Receipt of ACP document:  Received: Health Care Directive which was witnessed or notarized on 8/20/15.  Document previously scanned on 8/28/15.  Validation form completed and scanned.  Code Status reflects choices in most recent ACP document.  Confirmed/documented designated decision maker(s).  Added by Kerrie Joaquin RN, BSN, MA, Advance Care Planning Liaison.  Advance Care Planning 8/5/2015: Receipt of ACP document:  Received: POLST which was signed and dated by provider on 7-21-15.  Document previously scanned on 7-22-15.  Order reviewed and found to be valid.  Code Status reflects choices in most recent ACP document.  Confirmed/documented designated  decision maker(s).  Added by Jessica Gregorio RN Advance Care Planning Liaison with [facility]  Advance Care Planning 8/5/2015: Receipt of ACP document:  Received: invalid HCD document dated 2-22-15.  Document not previously scanned. Validation form completed indicating invalid document. Validation form sent to be scanned as notation of invalid document received.  Code Status reflects choices in most recent ACP document.  Confirmed/documented designated decision maker(s).  Added by Jessica Gregorio RN Advance Care Planning Liaison with [facility]       Diastolic dysfunction 03/12/2012     Priority: Medium     Atherosclerosis of aorta (H) 03/12/2012     Priority: Medium     ECHO 2/2012       GI bleed 07/25/2011     Priority: Medium     EGD-H Pylori, treated, Colonoscopy small Polyp - transfused 3 units of blood.       Allergic rhinitis 07/25/2011     Priority: Medium     Problem list name updated by automated process. Provider to review       Meniere's disease 07/25/2011     Priority: Medium     Problem list name updated by automated process. 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 Provider to review       Essential hypertension 12/06/1999     Priority: Medium     Problem list name updated by automated process. Provider to review          Past Medical History:    Past Medical History:   Diagnosis Date     Allergic rhinitis, cause unspecified 07/25/2011     Aneurysm of splenic artery (H) 03/31/2014     Aortic insufficiency 6/2/2015     ASCVD (arteriosclerotic cardiovascular disease) 08/25/2009      Atherosclerosis of aorta (H) 2012     Cystocele 2012     Diastolic dysfunction 2012     GI bleed 2011     Hyperlipidemia 2003     Hypertension 1999     Mammogram declined 2012     Meniere's disease, unspecified 2011     Mitral regurgitation 2015     Osteoarthritis 2000     Sick sinus syndrome (H) 2016     Squamous cell skin cancer, ala nasi 2006     Transient global amnesia 2004       Past Surgical History:    Past Surgical History:   Procedure Laterality Date     APPENDECTOMY       ARTHROSCOPY KNEE      RIGHT - Osteoarthritis     C TOTAL KNEE ARTHROPLASTY      LEFT - Osteoarthritis - Ram[facility]rez Ruvalcaba     C TOTAL KNEE ARTHROPLASTY      RIGHT - Osteoarthritis - Ram[facility]rez Ruvalcaba     CATARACT EXTRACTION and LENS IMPLANTATION      BILATERAL     COLONOSCOPY       Colonoscopy with Polypectomy      GI BLEED - DR. DEL CASTILLO     ESOPHAGOSCOPY, GASTROSCOPY, DUODENOSCOPY (EGD), COMBINED N/A 2019    Procedure: UPPER ENDOSCOPY WITH BIOPSY;  Surgeon: Jorge Freeman MD;  Location: [facility]             REMOVAL OF OVARIAN CYST(S)       HYSTERECTOMY, YANG      Metorrhagia     IMPLANT PACEMAKER  2016     Left Subclavian Line, Triple Lumen      Gastric Ulcer, Dieulfoy lesion, hemostatic clips placed - Massive GI Bleed - Transferred to [facility]     RELEASE CARPAL TUNNEL      RIGHT - Carpal Tunnel Syndrome     REPAIR BLADDER       SIGMOIDOSCOPY FLEXIBLE N/A 2019    Procedure: FLEXIBLE SIGMOIDOSCOPY;  Surgeon: Jorge Freeman MD;  Location: [facility]       Family History:    Family History   Problem Relation Age of Onset     Cancer Brother         Pancreatic     Cancer Brother         Prostate     Cancer Sister         Breast     Hypertension Brother      Hypertension Father      Hypertension Sister      Hypertension Mother      Aneurysm Daughter 68        cerebral, sudden death     Osteoporosis Other       Thyroid Disease No family hx of      Diabetes No family hx of        Social History:  Marital Status:   [5]  Social History     Tobacco Use     Smoking status: Never Smoker     Smokeless tobacco: Never Used   Substance Use Topics     Alcohol use: No     Alcohol/week: 0.0 standard drinks     Drug use: No        Medications:    acetaminophen 500 MG PO tablet  isosorbide mononitrate (IMDUR) 30 MG 24 hr tablet  LORazepam (ATIVAN) 0.5 MG tablet  metoprolol tartrate 50 MG PO tablet  Multiple Vitamins-Minerals (MULTIVITAMIN ADULT PO)  vitamin C 500 MG PO tablet  nitroGLYcerin (NITROSTAT) 0.4 MG sublingual tablet  pantoprazole 40 MG PO EC tablet          Review of Systems   Constitutional: Positive for fatigue and fever. Negative for chills.   HENT: Negative.    Eyes: Negative.    Respiratory: Positive for shortness of breath. Negative for cough.    Cardiovascular: Negative for chest pain and leg swelling.   Gastrointestinal: Negative.    Genitourinary: Negative.    Musculoskeletal: Negative.    Skin: Negative.    Neurological: Negative.        Physical Exam   BP: 178/96  Pulse: 74  Heart Rate: 75  Temp: 97.8  F (36.6  C)  Resp: 20  SpO2: 96 %      Physical Exam  Constitutional:       General: She is not in acute distress.     Appearance: She is not diaphoretic.   HENT:      Head: Atraumatic.      Mouth/Throat:      Pharynx: No oropharyngeal exudate.   Eyes:      General: No scleral icterus.     Pupils: Pupils are equal, round, and reactive to light.   Cardiovascular:      Heart sounds: Normal heart sounds.   Pulmonary:      Effort: Tachypnea present. No respiratory distress.      Breath sounds: Rales (very mild, bilateral, more prominent on the right) present.   Abdominal:      General: Bowel sounds are normal.      Palpations: Abdomen is soft.      Tenderness: There is no abdominal tenderness.   Musculoskeletal:         General: No tenderness.      Right lower leg: No edema.      Left lower leg: No edema.   Skin:      General: Skin is warm.      Findings: No rash.   Neurological:      General: No focal deficit present.      Mental Status: She is alert and oriented to person, place, and time.         ED Course        Procedures  Symptoms concerning for possible pneumonia vs CHF exacerbation.  CXR positive for bilateral lower infiltrates R>L.  Procalcitonin negative.  Low likelihood of pneumonia.      Troponin 0.065 which is same level as last visit one month ago.  BNP significantly elevated at 8000.  Likely diastolic heart failure.  20 mg lasix given.  10 mg labetolol given for BP in 210s systolic.      Discussed with Dr. Branch as possible pneumonia vs CHF exacerbation, most likely CHF exacerbation.  Dr. Branch spoke to the family who wanted to try to treat with Lasix and return home if possible.      Patient signed out to Dr. Freed at 2230.               Results for orders placed or performed during the hospital encounter of 03/14/20 (from the past 24 hour(s))   XR Chest 2 Views    Narrative    Procedure:XR CHEST 2 VW    Clinical history:Female, 89 years, sob    Technique: Two views are submitted.    Comparison: 2/19/2020    Findings: The cardiac silhouette is enlarged and unchanged. The  pulmonary vasculature is mildly prominent and unchanged.    The lungs now demonstrate bibasilar pneumonia, worse on the right.  Bony structures are similar in appearance.      Impression    Impression:   Bibasilar pneumonia, worse on the right.    Chronic changes throughout the lungs including hyperinflation are  similar when compared to prior study.    ERNESTINE MOYER MD   Basic metabolic panel   Result Value Ref Range    Sodium 137 133 - 144 mmol/L    Potassium 4.1 3.4 - 5.3 mmol/L    Chloride 104 94 - 109 mmol/L    Carbon Dioxide 25 20 - 32 mmol/L    Anion Gap 8 3 - 14 mmol/L    Glucose 120 (H) 70 - 99 mg/dL    Urea Nitrogen 31 (H) 7 - 30 mg/dL    Creatinine 0.95 0.52 - 1.04 mg/dL    GFR Estimate 51 (L) >60 mL/min/[1.73_m2]    GFR  Estimate If Black 59 (L) >60 mL/min/[1.73_m2]    Calcium 8.7 8.5 - 10.1 mg/dL   CBC with platelets differential   Result Value Ref Range    WBC 7.6 4.0 - 11.0 10e9/L    RBC Count 4.14 3.8 - 5.2 10e12/L    Hemoglobin 9.8 (L) 11.7 - 15.7 g/dL    Hematocrit 30.8 (L) 35.0 - 47.0 %    MCV 74 (L) 78 - 100 fl    MCH 23.7 (L) 26.5 - 33.0 pg    MCHC 31.8 31.5 - 36.5 g/dL    RDW 19.2 (H) 10.0 - 15.0 %    Platelet Count 296 150 - 450 10e9/L    Diff Method Automated Method     % Neutrophils 73.8 %    % Lymphocytes 15.0 %    % Monocytes 8.8 %    % Eosinophils 1.6 %    % Basophils 0.4 %    % Immature Granulocytes 0.4 %    Nucleated RBCs 0 0 /100    Absolute Neutrophil 5.6 1.6 - 8.3 10e9/L    Absolute Lymphocytes 1.1 0.8 - 5.3 10e9/L    Absolute Monocytes 0.7 0.0 - 1.3 10e9/L    Absolute Eosinophils 0.1 0.0 - 0.7 10e9/L    Absolute Basophils 0.0 0.0 - 0.2 10e9/L    Abs Immature Granulocytes 0.0 0 - 0.4 10e9/L    Absolute Nucleated RBC 0.0    Lactic acid whole blood   Result Value Ref Range    Lactic Acid 1.2 0.7 - 2.0 mmol/L   Nt probnp inpatient (BNP)   Result Value Ref Range    N-Terminal Pro BNP Inpatient 7,988 (H) 0 - 1,800 pg/mL   Troponin I   Result Value Ref Range    Troponin I ES 0.065 (H) 0.000 - 0.045 ug/L   Procalcitonin   Result Value Ref Range    Procalcitonin 0.05 ng/ml       Medications   furosemide (LASIX) injection 40 mg (has no administration in time range)   labetalol (NORMODYNE/TRANDATE) syringe 10 mg (10 mg Intravenous Given 3/14/20 2233)   furosemide (LASIX) injection 20 mg (20 mg Intravenous Given 3/14/20 2243)       Assessments & Plan (with Medical Decision Making)     I have reviewed the nursing notes.    I have reviewed the findings, diagnosis, plan and need for follow up with the patient.    New Prescriptions    No medications on file       Final diagnoses:   Acute diastolic congestive heart failure (H)     VICENTE Hair on 3/14/2020 at 10:58 PM   3/14/2020   [facility]     Stacie  VICENTE Mistry  03/14/20 [id number]

## 2020-03-15 NOTE — ED NOTES
Pt up to BR with standby only assist.  Denies any lightheadedness or dizziness on standing.  States her breathing is easier now and doesn't feel that she needs supplemental O2 now.  Dr. Freed advised.

## 2020-03-15 NOTE — ED NOTES
"Pt ambulated in [place] with standby assist.  Ambulating without problem.  SaO2 95% on RA.  Stated she feels \"fine\".  Dr. Freed advised.    "

## 2020-03-16 ENCOUNTER — TELEPHONE (OUTPATIENT)
Dept: FAMILY MEDICINE | Facility: OTHER | Age: 85
End: 2020-03-16

## 2020-03-16 DIAGNOSIS — I50.42 CHRONIC COMBINED SYSTOLIC AND DIASTOLIC CONGESTIVE HEART FAILURE (H): ICD-10-CM

## 2020-03-16 DIAGNOSIS — J18.9 PNEUMONIA OF BOTH LOWER LOBES DUE TO INFECTIOUS ORGANISM: Primary | ICD-10-CM

## 2020-03-16 RX ORDER — LEVOFLOXACIN 500 MG/1
500 TABLET, FILM COATED ORAL DAILY
Qty: 10 TABLET | Refills: 0 | Status: SHIPPED | OUTPATIENT
Start: 2020-03-16 | End: 2020-06-05

## 2020-03-16 RX ORDER — FUROSEMIDE 20 MG
20 TABLET ORAL DAILY
Qty: 90 TABLET | Refills: 3 | Status: ON HOLD | OUTPATIENT
Start: 2020-03-16 | End: 2020-01-01

## 2020-03-16 NOTE — TELEPHONE ENCOUNTER
Notified daughter of information below. They will start her Lasix and antibiotic today. Will follow up prn.

## 2020-03-16 NOTE — TELEPHONE ENCOUNTER
Yes she should stay on Lasix 20 mg daily. This was sent to [facility] [facility]. She should not come in for follow up unless she is worsening

## 2020-03-16 NOTE — TELEPHONE ENCOUNTER
Called daughter Yesica and notified her of this information, she is in [place] until the end of March but will have her sister Raeann  the RX and have her mom start this today. She noted they did give her mom a 7 day supply of Lasix, is this something you want her to continue and/or does she need follow up at all in a week, they really don't want her to come in not need be. I advised I would call her back with more information.

## 2020-03-16 NOTE — TELEPHONE ENCOUNTER
Please call Lauren and/or her daughter regarding bilateral pneumonia noted on chest x ray she had over the weekend. I have sent in a prescription for her to [facility] for levaquin 500 mg daily to start for 10 days. If she isn't doing well she needs to be seen   Spoke with patient and made her aware that form was received and will possibly be faxed in tomorrow.

## 2020-03-20 DIAGNOSIS — K26.9 DUODENAL ULCER: Primary | ICD-10-CM

## 2020-03-20 RX ORDER — PANTOPRAZOLE SODIUM 40 MG/1
TABLET, DELAYED RELEASE ORAL
Qty: 60 TABLET | Refills: 0 | Status: SHIPPED | OUTPATIENT
Start: 2020-03-20 | End: 2020-03-20

## 2020-03-20 RX ORDER — PANTOPRAZOLE SODIUM 40 MG/1
40 TABLET, DELAYED RELEASE ORAL DAILY
Qty: 30 TABLET | Refills: 0 | Status: SHIPPED | OUTPATIENT
Start: 2020-03-20 | End: 2020-05-22

## 2020-03-20 NOTE — TELEPHONE ENCOUNTER
Pantoprazole 40 mg EC      Last Written Prescription Date:  historical  Last Fill Quantity: 0,   # refills: 0  Last Office Visit: 11-25-19  Future Office visit:    Next 5 appointments (look out 90 days)    May 26, 2020  2:15 PM CDT  (Arrive by 2:00 PM)  SHORT with [facility] Jones MD  [facility] [address]  [place] [address]  [phone]           Routing refill request to provider for review/approval because:

## 2020-05-12 ENCOUNTER — TELEPHONE (OUTPATIENT)
Dept: FAMILY MEDICINE | Facility: OTHER | Age: 85
End: 2020-05-12

## 2020-05-12 ENCOUNTER — NURSE TRIAGE (OUTPATIENT)
Dept: FAMILY MEDICINE | Facility: OTHER | Age: 85
End: 2020-05-12

## 2020-05-12 DIAGNOSIS — R30.0 DYSURIA: Primary | ICD-10-CM

## 2020-05-12 DIAGNOSIS — R30.0 DYSURIA: ICD-10-CM

## 2020-05-12 LAB
ALBUMIN UR-MCNC: NEGATIVE MG/DL
APPEARANCE UR: CLEAR
BILIRUB UR QL STRIP: NEGATIVE
COLOR UR AUTO: NORMAL
GLUCOSE UR STRIP-MCNC: NEGATIVE MG/DL
HGB UR QL STRIP: NEGATIVE
KETONES UR STRIP-MCNC: NEGATIVE MG/DL
LEUKOCYTE ESTERASE UR QL STRIP: NEGATIVE
NITRATE UR QL: NEGATIVE
PH UR STRIP: 6.5 PH (ref 4.7–8)
SOURCE: NORMAL
SP GR UR STRIP: 1.01 (ref 1–1.03)
UROBILINOGEN UR STRIP-MCNC: NORMAL MG/DL (ref 0–2)

## 2020-05-12 PROCEDURE — 81003 URINALYSIS AUTO W/O SCOPE: CPT | Mod: ZL | Performed by: NURSE PRACTITIONER

## 2020-05-12 NOTE — TELEPHONE ENCOUNTER
Telephone call from patient's daughter reporting patient is unable to urinate. Daughter is unaware if patient is having any pain or odor of urine. Patient reported she was only able to urinate a dribble this am. Daughter unaware if abdomen is firm or bloated. Only aware that patient is unable to urinate.     Per protocol patient's daughter has been instructed patient is in need of being seen in Urgent Care/ED for further evaluation and treatment of the urinary symptoms.     Patient's daughter expressed an understanding and will transport patient to the hospital.     Instructions also provided to mask prior to entering the hospital.      Reason for Disposition    [1] Unable to urinate (or only a few drops) > 4 hours AND     [2] bladder feels very full (e.g., palpable bladder or strong urge to urinate)    Additional Information    Negative: Shock suspected (e.g., cold/pale/clammy skin, too weak to stand, low BP, rapid pulse)    Negative: Sounds like a life-threatening emergency to the triager    Negative: Followed a genital area injury    Negative: Followed a genital area injury (penis, scrotum)    Negative: Vaginal discharge    Negative: Pus (white, yellow) or bloody discharge from end of penis    Negative: [1] Taking antibiotic for urinary tract infection (UTI) AND [2] female    Negative: [1] Taking antibiotic for urinary tract infection (UTI) AND [2] male    Negative: [1] Discomfort (pain, burning or stinging) when passing urine AND [2] pregnant    Negative: [1] Discomfort (pain, burning or stinging) when passing urine AND [2] postpartum < 1 month    Negative: [1] Discomfort (pain, burning or stinging) when passing urine AND [2] female    Negative: [1] Discomfort (pain, burning or stinging) when passing urine AND [2] male    Negative: Pain or itching in the vulvar area    Negative: Pain in scrotum is main symptom    Negative: Blood in the urine is main symptom    Negative: Symptoms arising from use of a urinary  "catheter (Squires or Coude)    Answer Assessment - Initial Assessment Questions  1. SYMPTOM: \"What's the main symptom you're concerned about?\" (e.g., frequency, incontinence)      Difficulty with urination, not fully emptying bladder, urinating small amount of uring  2. ONSET: \"When did the  Urinary sympotoms  start?\"      This morning. 5/12/20  3. PAIN: \"Is there any pain?\" If so, ask: \"How bad is it?\" (Scale: 1-10; mild, moderate, severe)      Daughter is unaware if patient is having pain at this time  4. CAUSE: \"What do you think is causing the symptoms?\"      Urinary retention   5. OTHER SYMPTOMS: \"Do you have any other symptoms?\" (e.g., fever, flank pain, blood in urine, pain with urination)      Unknown per daughter, patient called and reported she is unable to urinate.   6. PREGNANCY: \"Is there any chance you are pregnant?\" \"When was your last menstrual period?\"      No    Protocols used: URINARY SYMPTOMS-A-AH      "

## 2020-05-12 NOTE — TELEPHONE ENCOUNTER
Called and spoke with pt daughter Yesica.Pt gave permission. She is urinating at this time and did have back pain this AM,but it is gone now.This AM she was unable to urinate.Updated her to go to clinic and  lab bottle and bring back to clinic ASAP. She verbalized understanding.Udpated if pt unable to urinate or s/s worsen she need to go to ED/UC    Pt uses [facility].Will cancel appt..    Need UA/UC order.      Muna Huerta RN

## 2020-05-12 NOTE — RESULT ENCOUNTER NOTE
Normal UA.    Increase fluids  To ER with increased symptoms  Walk in clinic with ongoing symptoms    Tavia Palacios [facility]  [phone]

## 2020-05-12 NOTE — TELEPHONE ENCOUNTER
Pt called, reports suspected bladder infection. Pt states that she is able to urinate now and reports increased frequency. Reports dysuria. Denies hematuria, abdominal or back pain. Pt does not want to come in to clinic due to pandemic. Scheduled for telephone visit with covering provider.

## 2020-05-21 DIAGNOSIS — K26.9 DUODENAL ULCER: ICD-10-CM

## 2020-05-21 DIAGNOSIS — F41.9 ANXIETY: ICD-10-CM

## 2020-05-21 NOTE — TELEPHONE ENCOUNTER
pantoprazole      Last Written Prescription Date:  03/20/2020  Last Fill Quantity: 30,   # refills: 0  Last Office Visit: 03/03/2020  Future Office visit:       lorazepam      Last Written Prescription Date:  03/06/2020  Last Fill Quantity: 30,   # refills: 0

## 2020-05-22 RX ORDER — LORAZEPAM 0.5 MG/1
TABLET ORAL
Qty: 30 TABLET | Refills: 0 | Status: SHIPPED | OUTPATIENT
Start: 2020-05-22 | End: 2020-07-23

## 2020-05-22 RX ORDER — PANTOPRAZOLE SODIUM 40 MG/1
TABLET, DELAYED RELEASE ORAL
Qty: 30 TABLET | Refills: 3 | Status: SHIPPED | OUTPATIENT
Start: 2020-05-22 | End: 2020-01-01

## 2020-06-05 ENCOUNTER — HOSPITAL ENCOUNTER (EMERGENCY)
Facility: HOSPITAL | Age: 85
Discharge: HOME | End: 2020-06-05
Attending: EMERGENCY MEDICINE | Admitting: EMERGENCY MEDICINE
Payer: COMMERCIAL

## 2020-06-05 VITALS
HEART RATE: 72 BPM | DIASTOLIC BLOOD PRESSURE: 83 MMHG | OXYGEN SATURATION: 98 % | SYSTOLIC BLOOD PRESSURE: 175 MMHG | RESPIRATION RATE: 18 BRPM | TEMPERATURE: 98.3 F

## 2020-06-05 DIAGNOSIS — R04.0 EPISTAXIS: Primary | ICD-10-CM

## 2020-06-05 DIAGNOSIS — I10 ESSENTIAL HYPERTENSION: ICD-10-CM

## 2020-06-05 DIAGNOSIS — I10 BENIGN ESSENTIAL HYPERTENSION: ICD-10-CM

## 2020-06-05 PROCEDURE — 30901 CONTROL OF NOSEBLEED: CPT | Mod: LT | Performed by: EMERGENCY MEDICINE

## 2020-06-05 PROCEDURE — 25000132 ZZH RX MED GY IP 250 OP 250 PS 637: Performed by: EMERGENCY MEDICINE

## 2020-06-05 PROCEDURE — 99283 EMERGENCY DEPT VISIT LOW MDM: CPT | Mod: 25

## 2020-06-05 PROCEDURE — 30901 CONTROL OF NOSEBLEED: CPT | Mod: LT

## 2020-06-05 PROCEDURE — 99283 EMERGENCY DEPT VISIT LOW MDM: CPT | Mod: 25 | Performed by: EMERGENCY MEDICINE

## 2020-06-05 RX ORDER — CLONIDINE HYDROCHLORIDE 0.1 MG/1
0.1 TABLET ORAL ONCE
Status: COMPLETED | OUTPATIENT
Start: 2020-06-05 | End: 2020-06-05

## 2020-06-05 RX ADMIN — CLONIDINE HYDROCHLORIDE 0.1 MG: 0.1 TABLET ORAL at 09:05

## 2020-06-05 ASSESSMENT — ENCOUNTER SYMPTOMS
ABDOMINAL PAIN: 0
FEVER: 0
SHORTNESS OF BREATH: 0

## 2020-06-05 NOTE — ED AVS SNAPSHOT
[facility]  [address] [address]  Phone:  [phone]                                    Lauren Barron   MRN: [id number]    Department:  [facility]   Date of Visit:  6/5/2020           After Visit Summary Signature Page    I have received my discharge instructions, and my questions have been answered. I have discussed any challenges I see with this plan with the nurse or doctor.    ..........................................................................................................................................  Patient/Patient Representative Signature      ..........................................................................................................................................  Patient Representative Print Name and Relationship to Patient    ..................................................               ................................................  Date                                   Time    ..........................................................................................................................................  Reviewed by Signature/Title    ...................................................              ..............................................  Date                                               Time          22EPIC Rev 08/18

## 2020-06-05 NOTE — ED PROVIDER NOTES
History     Chief Complaint   Patient presents with     Epistaxis     HPI  Lauren Barron is a 89 year old female who presented to the emergency department with complaints of nosebleed since 5 AM today.  The bleeding is on the left nostril.  Last time patient had a nosebleed is more than 8 years ago.  She is not on any blood thinners.  Her blood pressure was noted to be very high at the time of evaluation and patient admitted that her blood pressure medications were discontinued by the PCP.  She denies any headache, cough, shortness of breath, hemoptysis, hematemesis, recent instrumentation in the ER.  No dizziness, chest pain, palpitations.    Allergies:  Allergies   Allergen Reactions     [place] Juice Other (See Comments), Nausea and GI Disturbance     Vertigo     Food      Oranges.     Naprosyn [Naproxen]      Incontinence       Niacin Swelling       Problem List:    Patient Active Problem List    Diagnosis Date Noted     Acute CHF (congestive heart failure) (H) 02/19/2020     Priority: Medium     Acute hyponatremia 10/14/2019     Priority: Medium     Hyponatremia 10/14/2019     Priority: Medium     Duodenal ulcer 10/03/2019     Priority: Medium     Acute GI bleeding 09/25/2019     Priority: Medium     Other emphysema (H) 10/29/2018     Priority: Medium     CKD (chronic kidney disease) stage 3, GFR 30-59 ml/min (H) 11/20/2017     Priority: Medium     Pulmonary nodules 06/08/2017     Priority: Medium     One, left lung, repeat chest CT 6 months, 12/2017       Benign essential hypertension 12/05/2016     Priority: Medium     Splenic artery aneurysm (H) 10/06/2016     Priority: Medium     Pulmonary fibrosis (H) 10/06/2016     Priority: Medium     Hiatal hernia 09/08/2016     Priority: Medium     Sick sinus syndrome (H) 06/14/2016     Priority: Medium     Pacemaker placed 4/2016       Presence of cardiac pacemaker 03/28/2016     Priority: Medium     Overview:   Nenzel Accolade [id number]  SN#[id number]  3/28/2016  Atr  lead [address]69  #[id number]  3/28/2016  Vent lead [address]70  #[id number]  3/28/2016  Dr Bradley  Bradycardia       Coronary arteriosclerosis in native artery 03/15/2016     Priority: Medium     Osteoarthritis 09/21/2015     Priority: Medium     Moderate aortic insufficiency 06/02/2015     Priority: Medium     moderate, aortic sclerosis without stenosis  echo 5/6/2015       Moderate mitral insufficiency 06/02/2015     Priority: Medium     moderate, echo 5/6/2015       Bradycardia 05/05/2015     Priority: Medium     Due to Metoprolol and amlodipine       Epistaxis, recurrent 05/01/2013     Priority: Medium     Mammogram declined 12/20/2012     Priority: Medium     Epistaxis 10/20/2012     Priority: Medium     Gastrointestinal hemorrhage 10/16/2012     Priority: Medium     Thrombocytopenia (H) 10/16/2012     Priority: Medium     Acute posthemorrhagic anemia 10/15/2012     Priority: Medium     Cystocele 03/22/2012     Priority: Medium     Advance Care Planning 03/16/2012     Priority: Medium     Advance Care Planning 8/31/2015: Receipt of ACP document:  Received: Health Care Directive which was witnessed or notarized on 8/20/15.  Document previously scanned on 8/28/15.  Validation form completed and scanned.  Code Status reflects choices in most recent ACP document.  Confirmed/documented designated decision maker(s).  Added by Kerrie Joaquin RN, BSN, MA, Advance Care Planning Liaison.  Advance Care Planning 8/5/2015: Receipt of ACP document:  Received: POLST which was signed and dated by provider on 7-21-15.  Document previously scanned on 7-22-15.  Order reviewed and found to be valid.  Code Status reflects choices in most recent ACP document.  Confirmed/documented designated decision maker(s).  Added by Jessica Gregorio RN Advance Care Planning Liaison with [facility]  Advance Care Planning 8/5/2015: Receipt of ACP document:  Received: invalid HCD document dated 2-22-15.  Document not previously scanned.  Validation form completed indicating invalid document. Validation form sent to be scanned as notation of invalid document received.  Code Status reflects choices in most recent ACP document.  Confirmed/documented designated decision maker(s).  Added by Jessica Gregorio RN Advance Care Planning Liaison with [facility] [employer]       Diastolic dysfunction 03/12/2012     Priority: Medium     Atherosclerosis of aorta (H) 03/12/2012     Priority: Medium     ECHO 2/2012       GI bleed 07/25/2011     Priority: Medium     EGD-H Pylori, treated, Colonoscopy small Polyp - transfused 3 units of blood.       Allergic rhinitis 07/25/2011     Priority: Medium     Problem list name updated by automated process. Provider to review       Meniere's disease 07/25/2011     Priority: Medium     Problem list name updated by automated process. 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 Provider to review       Essential hypertension 12/06/1999     Priority: Medium     Problem list name updated by automated process. Provider to review          Past Medical History:    Past Medical History:   Diagnosis Date     Allergic rhinitis, cause unspecified 07/25/2011     Aneurysm of splenic artery (H) 03/31/2014     Aortic insufficiency 6/2/2015     ASCVD (arteriosclerotic cardiovascular disease) 08/25/2009     Atherosclerosis of aorta (H) 03/12/2012     Cystocele 03/22/2012     Diastolic dysfunction 03/12/2012     GI bleed 07/25/2011     Hyperlipidemia 12/02/2003     Hypertension 12/06/1999     Mammogram declined 12/20/2012     Meniere's  disease, unspecified 2011     Mitral regurgitation 2015     Osteoarthritis 2000     Sick sinus syndrome (H) 2016     Squamous cell skin cancer, chiquita coelloi 2006     Transient global amnesia 2004       Past Surgical History:    Past Surgical History:   Procedure Laterality Date     APPENDECTOMY       ARTHROSCOPY KNEE      RIGHT - Osteoarthritis     C TOTAL KNEE ARTHROPLASTY      LEFT - Osteoarthritis - Ramírez Ruvalcaba     C TOTAL KNEE ARTHROPLASTY      RIGHT - Osteoarthritis - Ramírez Ruvalcaba     CATARACT EXTRACTION and LENS IMPLANTATION      BILATERAL     COLONOSCOPY       Colonoscopy with Polypectomy      GI BLEED - DR. DEL CASTILLO     ESOPHAGOSCOPY, GASTROSCOPY, DUODENOSCOPY (EGD), COMBINED N/A 2019    Procedure: UPPER ENDOSCOPY WITH BIOPSY;  Surgeon: Jorge Freeman MD;  Location: [facility]             REMOVAL OF OVARIAN CYST(S)       HYSTERECTOMY, YANG      Metorrhagia     IMPLANT PACEMAKER  2016     Left Subclavian Line, Triple Lumen      Gastric Ulcer, Dieulfoy lesion, hemostatic clips placed - Massive GI Bleed - Transferred to [facility]     RELEASE CARPAL TUNNEL      RIGHT - Carpal Tunnel Syndrome     REPAIR BLADDER       SIGMOIDOSCOPY FLEXIBLE N/A 2019    Procedure: FLEXIBLE SIGMOIDOSCOPY;  Surgeon: Jorge Freeman MD;  Location: [facility] OR       Family History:    Family History   Problem Relation Age of Onset     Cancer Brother         Pancreatic     Cancer Brother         Prostate     Cancer Sister         Breast     Hypertension Brother      Hypertension Father      Hypertension Sister      Hypertension Mother      Aneurysm Daughter 68        cerebral, sudden death     Osteoporosis Other      Thyroid Disease No family hx of      Diabetes No family hx of        Social History:  Marital Status:   [5]  Social History     Tobacco Use     Smoking status: Never Smoker     Smokeless tobacco: Never Used   Substance Use Topics      Alcohol use: No     Alcohol/week: 0.0 standard drinks     Drug use: No        Medications:    furosemide (LASIX) 20 MG tablet  isosorbide mononitrate (IMDUR) 30 MG 24 hr tablet  LORazepam (ATIVAN) 0.5 MG tablet  Multiple Vitamins-Minerals (MULTIVITAMIN ADULT PO)  pantoprazole (PROTONIX) 40 MG EC tablet  acetaminophen 500 MG PO tablet  metoprolol tartrate 50 MG PO tablet  nitroGLYcerin (NITROSTAT) 0.4 MG sublingual tablet  vitamin C 500 MG PO tablet          Review of Systems   Constitutional: Negative for fever.   Respiratory: Negative for shortness of breath.    Cardiovascular: Negative for chest pain.   Gastrointestinal: Negative for abdominal pain.   All other systems reviewed and are negative.      Physical Exam   BP: (!) 194/86  Pulse: 60  Heart Rate: 61  Temp: 96.7  F (35.9  C)  Resp: 20  SpO2: 98 %      Physical Exam  Vitals signs and nursing note reviewed.   Constitutional:       General: She is not in acute distress.     Appearance: Normal appearance. She is not diaphoretic.   HENT:      Head: Normocephalic and atraumatic.      Nose:      Comments: Nose clamped     Mouth/Throat:      Pharynx: No oropharyngeal exudate.   Eyes:      General: No scleral icterus.     Pupils: Pupils are equal, round, and reactive to light.   Cardiovascular:      Heart sounds: Normal heart sounds.   Pulmonary:      Effort: No respiratory distress.      Breath sounds: Normal breath sounds.   Abdominal:      General: Bowel sounds are normal.      Palpations: Abdomen is soft.      Tenderness: There is no abdominal tenderness.   Musculoskeletal:         General: No tenderness.   Skin:     General: Skin is warm.      Findings: No rash.   Neurological:      Mental Status: She is alert.         ED Course   Patient evaluated and vitals reviewed.    Nose clamp already applied and will be maintained until reviewed after lowering the blood pressure.   Catapres given for hypertension.    [facility]    -Epistaxis  Mgmt    Date/Time: 6/5/2020 9:33 AM  Performed by: Elvis Akins MD  Authorized by: Elvis Akins MD       ANESTHESIA (see MAR for exact dosages)     Anesthesia method:  None  PROCEDURE DETAILS     Treatment site:  L anterior    Treatment method:  Silver nitrate    Treatment complexity:  Limited    Treatment episode: no recurrence of recent bleed        POST PROCEDURE     Assessment:  Bleeding stopped  PROCEDURE   Patient Tolerance:  Patient tolerated the procedure well with no immediate complications            No results found for this or any previous visit (from the past 24 hour(s)).    Medications   cloNIDine (CATAPRES) tablet 0.1 mg (0.1 mg Oral Given 6/5/20 0905)       Assessments & Plan (with Medical Decision Making)   Epistaxis: Left anterior cyst on the nasal septum.  Cauterized with silver nitrate and hemostasis achieved.  Discharge instructions for epistaxis given.  Hypertension: Blood pressure was very high upon arrival to the ED and responded well to a single dose of Catapres 0.1 mg.  Advised follow-up with PCP for continuous monitoring and management of her blood pressure.  Blood pressure at the time of discharge was acceptable at 175/83 mmHg    I have reviewed the nursing notes.    I have reviewed the findings, diagnosis, plan and need for follow up with the patient.    New Prescriptions    No medications on file       Final diagnoses:   Epistaxis   Benign essential hypertension       6/5/2020   [facility]     Elvis Akins MD  06/05/20 0951

## 2020-06-05 NOTE — ED NOTES
"\"Left nostril bled yesterday and stopped.  Left nostril started bleeding again at 0530 this morning.\"   "

## 2020-06-10 ENCOUNTER — TELEPHONE (OUTPATIENT)
Dept: FAMILY MEDICINE | Facility: OTHER | Age: 85
End: 2020-06-10

## 2020-06-10 NOTE — TELEPHONE ENCOUNTER
Emergency Department and Urgent Care Follow-up      Reason for ER/UC visit: epistaxis  o Date seen: 6/5/20      New or Worsening symptoms:  Has had high BP in ED       Prescription Received/Picked up from Pharmacy?: no   o Medications started? no  o Any questions or issues regarding your prescription?: no      Follow-up Results or Labs that are pending: none       Questions or concerns?: concerned of high BP      ER Recommends Follow-up by: no time frame      RN Recommendations: phone visit scheduled  o Appointment scheduled: yes 6/15/20     If you start feeling worse, or have any further questions, please feel free to contact Nurse Triage at [phone].  If needing immediate medical attention at any time please call 911/Go to the ER.

## 2020-06-10 NOTE — TELEPHONE ENCOUNTER
Emergency Department and Urgent Care Follow-up      Reason for ER/UC visit: epistaxis  o Date seen: 6/5/20      New or Worsening symptoms:  Has had high BP in ED       Prescription Received/Picked up from Pharmacy?: no   o Medications started? no  o Any questions or issues regarding your prescription?: no      Follow-up Results or Labs that are pending: none       Questions or concerns?: concerned of high BP      ER Recommends Follow-up by: no time fram      RN Recommendations: phone visit scheduled  o Appointment scheduled: yes 6/15/20    If you start feeling worse, or have any further questions, please feel free to contact Nurse Triage at [phone].  If needing immediate medical attention at any time please call 911/Go to the ER.

## 2020-06-15 ENCOUNTER — VIRTUAL VISIT (OUTPATIENT)
Dept: FAMILY MEDICINE | Facility: OTHER | Age: 85
End: 2020-06-15
Attending: FAMILY MEDICINE
Payer: COMMERCIAL

## 2020-06-15 VITALS
HEART RATE: 63 BPM | DIASTOLIC BLOOD PRESSURE: 74 MMHG | SYSTOLIC BLOOD PRESSURE: 158 MMHG | WEIGHT: 120 LBS | BODY MASS INDEX: 25.08 KG/M2

## 2020-06-15 DIAGNOSIS — I10 BENIGN ESSENTIAL HYPERTENSION: Primary | ICD-10-CM

## 2020-06-15 DIAGNOSIS — L98.9 SKIN LESION: ICD-10-CM

## 2020-06-15 PROCEDURE — 99213 OFFICE O/P EST LOW 20 MIN: CPT | Mod: 95 | Performed by: FAMILY MEDICINE

## 2020-06-15 ASSESSMENT — PAIN SCALES - GENERAL: PAINLEVEL: NO PAIN (0)

## 2020-06-15 NOTE — NURSING NOTE
"Chief Complaint   Patient presents with     Hospital F/U       Initial BP (!) 158/74   Pulse 63   Wt 54.4 kg (120 lb)   BMI 25.08 kg/m   Estimated body mass index is 25.08 kg/m  as calculated from the following:    Height as of 2/19/20: 1.473 m (4' 10\").    Weight as of this encounter: 54.4 kg (120 lb).  Medication Reconciliation: complete  Pamela M. Lechevalier, LPN    "

## 2020-06-15 NOTE — PROGRESS NOTES
"Lauren Barron is a 89 year old female who is being evaluated via a billable telephone visit.      The patient has been notified of following:     \"This telephone visit will be conducted via a call between you and your physician/provider. We have found that certain health care needs can be provided without the need for a physical exam.  This service lets us provide the care you need with a short phone conversation.  If a prescription is necessary we can send it directly to your pharmacy.  If lab work is needed we can place an order for that and you can then stop by our lab to have the test done at a later time.    Telephone visits are billed at different rates depending on your insurance coverage. During this emergency period, for some insurers they may be billed the same as an in-person visit.  Please reach out to your insurance provider with any questions.    If during the course of the call the physician/provider feels a telephone visit is not appropriate, you will not be charged for this service.\"    Patient has given verbal consent for Telephone visit?  Yes    What phone number would you like to be contacted at? [phone]    How would you like to obtain your AVS? Mail a copy    Subjective     Lauren Barron is a 89 year old female who presents via phone visit today for the following health issues:    HPI  ED/UC Followup:    Facility:  [facility] [place]  Date of visit: 6/5/2020  Reason for visit: bloody nose and bandar blood pressure   Current Status: still has elevated bp no more nose bleed      Bps checked 144/61, 139/80. 152/78, 151/69 over the weekend. No chest pain or shortness of breath. No further nose bleeds    Sore on right arm  This is the size of a pencil eraser and was red and tender about a month ago, now is smaller, non tender, and scabbing. No drainage           Patient Active Problem List   Diagnosis     Epistaxis, recurrent     Advance Care Planning     Hyperlipidemia     Essential hypertension     " Transient global amnesia     GI bleed     ASCVD (arteriosclerotic cardiovascular disease)     Squamous cell skin cancer, ala nasi     Allergic rhinitis     Meniere's disease     Diastolic dysfunction     Atherosclerosis of aorta (H)     Cystocele     Mammogram declined     Bradycardia     Moderate aortic insufficiency     Moderate mitral insufficiency     Osteoarthritis     Sick sinus syndrome (H)     Acute posthemorrhagic anemia     Coronary arteriosclerosis in native artery     Epistaxis     Gastrointestinal hemorrhage     Multiple-type hyperlipidemia     Presence of cardiac pacemaker     Thrombocytopenia (H)     Hiatal hernia     Splenic artery aneurysm (H)     Pulmonary fibrosis (H)     Benign essential hypertension     Pulmonary nodules     CKD (chronic kidney disease) stage 3, GFR 30-59 ml/min (H)     Other emphysema (H)     Acute GI bleeding     Duodenal ulcer     Acute hyponatremia     Hyponatremia     Acute CHF (congestive heart failure) (H)     Past Surgical History:   Procedure Laterality Date     APPENDECTOMY       ARTHROSCOPY KNEE      RIGHT - Osteoarthritis     C TOTAL KNEE ARTHROPLASTY      LEFT - Osteoarthritis - Ramírez Ruvalcaba     C TOTAL KNEE ARTHROPLASTY      RIGHT - Osteoarthritis - Ramírez Ruvalcaba     CATARACT EXTRACTION and LENS IMPLANTATION      BILATERAL     COLONOSCOPY       Colonoscopy with Polypectomy      GI BLEED - DR. DEL CASTILLO     ESOPHAGOSCOPY, GASTROSCOPY, DUODENOSCOPY (EGD), COMBINED N/A 2019    Procedure: UPPER ENDOSCOPY WITH BIOPSY;  Surgeon: Jorge Freeman MD;  Location: [facility]             REMOVAL OF OVARIAN CYST(S)       HYSTERECTOMY, YANG      Metorrhagia     IMPLANT PACEMAKER  2016     Left Subclavian Line, Triple Lumen      Gastric Ulcer, Dieulfoy lesion, hemostatic clips placed - Massive GI Bleed - Transferred to [facility]     RELEASE CARPAL TUNNEL      RIGHT - Carpal Tunnel Syndrome     REPAIR BLADDER       SIGMOIDOSCOPY  FLEXIBLE N/A 9/26/2019    Procedure: FLEXIBLE SIGMOIDOSCOPY;  Surgeon: Jorge Freeman MD;  Location: [facility]       Social History     Tobacco Use     Smoking status: Never Smoker     Smokeless tobacco: Never Used   Substance Use Topics     Alcohol use: No     Alcohol/week: 0.0 standard drinks     Family History   Problem Relation Age of Onset     Cancer Brother         Pancreatic     Cancer Brother         Prostate     Cancer Sister         Breast     Hypertension Brother      Hypertension Father      Hypertension Sister      Hypertension Mother      Aneurysm Daughter 68        cerebral, sudden death     Osteoporosis Other      Thyroid Disease No family hx of      Diabetes No family hx of          Current Outpatient Medications   Medication Sig Dispense Refill     acetaminophen 500 MG PO tablet Take 1,000 mg by mouth as needed       furosemide (LASIX) 20 MG tablet Take 1 tablet (20 mg) by mouth daily 90 tablet 3     isosorbide mononitrate (IMDUR) 30 MG 24 hr tablet TAKE ONE TABLET BY MOUTH AT BEDTIME. 90 tablet 3     LORazepam (ATIVAN) 0.5 MG tablet TAKE 1 TABLET BY MOUTH EVERY 8 HOURS AS NEEDED FOR ANXIETY 30 tablet 0     Multiple Vitamins-Minerals (MULTIVITAMIN ADULT PO) Take 1 tablet by mouth daily (Patient uses [facility] Healthy Multivitamin, Dr. Lucy lakhani)       nitroGLYcerin (NITROSTAT) 0.4 MG sublingual tablet Place 1 tablet (0.4mg) by sublingual route at the first sign of attack; may repeat ever 5 min until relief; if pain persists after 3 tablets in 15 minutes prompt medical attention is recommended. AS NEEDED 25 tablet 3     pantoprazole (PROTONIX) 40 MG EC tablet TAKE 1 TABLET BY MOUTH ONCE DAILY. 30 tablet 3     vitamin C 500 MG PO tablet Take 500 mg by mouth daily        Allergies   Allergen Reactions     [place] Juice Other (See Comments), Nausea and GI Disturbance     Vertigo     Food      Oranges.     Naprosyn [Naproxen]      Incontinence       Niacin Swelling     BP Readings from Last 3 Encounters:    06/15/20 (!) 158/74   06/05/20 175/83   03/15/20 133/91    Wt Readings from Last 3 Encounters:   06/15/20 54.4 kg (120 lb)   02/19/20 51.3 kg (113 lb 1.5 oz)   11/25/19 51.7 kg (114 lb)                    Reviewed and updated as needed this visit by Provider         Review of Systems   Constitutional, HEENT, cardiovascular, pulmonary, gi and gu systems are negative, except as otherwise noted.       Objective   Reported vitals:  BP (!) 158/74   Pulse 63   Wt 54.4 kg (120 lb)   BMI 25.08 kg/m     healthy, alert and no distress  PSYCH: Alert and oriented times 3; coherent speech, normal   rate and volume, able to articulate logical thoughts, able   to abstract reason, no tangential thoughts, no hallucinations   or delusions  Her affect is normal  RESP: No cough, no audible wheezing, able to talk in full sentences  Remainder of exam unable to be completed due to telephone visits            Assessment/Plan:  1. Benign essential hypertension  Borderline increases in systolic pressure. We will continue to follow this and not start medication at this time. Recheck in 4 weeks. She prefers an in person visit     2. Skin lesion  Right arm. Follow at this time as it sounds as though it is resolving      Return in about 4 weeks (around 7/13/2020) for BP Recheck, recheck sore right arm, INPERSON VISIT IN AM PLEASE .      Phone call duration:  8 minutes    Vandana Jones MD

## 2020-07-14 NOTE — PROGRESS NOTES
Subjective     Lauren Barron is a 89 year old female who presents to clinic today for the following health issues:    HPI   Hypertension Follow-up      Do you check your blood pressure regularly outside of the clinic? Yes has been running good    Are you following a low salt diet? Yes    Are your blood pressures ever more than 140 on the top number (systolic) OR more   than 90 on the bottom number (diastolic), for example 140/90? Yes      How many servings of fruits and vegetables do you eat daily?  2-3    On average, how many sweetened beverages do you drink each day (Examples: soda, juice, sweet tea, etc.  Do NOT count diet or artificially sweetened beverages)?   0    How many days per week do you exercise enough to make your heart beat faster? none    How many minutes a day do you exercise enough to make your heart beat faster? none    How many days per week do you miss taking your medication? 0      Pressures have been good at home. She is accompanied by her daughter Raeann today    Derm Problem      Duration: few weeks    Description (location/character/radiation): small lump on right forearm went away on its own    Intensity:  0/10    Accompanying signs and symptoms: none    History (similar episodes/previous evaluation): None    Precipitating or alleviating factors: None    Therapies tried and outcome: None       Recurrent nose bleeds  This has been stable but she would like to have it checked, right nare    Patient Active Problem List   Diagnosis     Epistaxis, recurrent     Advance Care Planning     Hyperlipidemia     Essential hypertension     Transient global amnesia     GI bleed     ASCVD (arteriosclerotic cardiovascular disease)     Squamous cell skin cancer, ala nasi     Allergic rhinitis     Meniere's disease     Diastolic dysfunction     Atherosclerosis of aorta (H)     Cystocele     Mammogram declined     Bradycardia     Moderate aortic insufficiency     Moderate mitral insufficiency      Osteoarthritis     Sick sinus syndrome (H)     Acute posthemorrhagic anemia     Coronary arteriosclerosis in native artery     Epistaxis     Gastrointestinal hemorrhage     Multiple-type hyperlipidemia     Presence of cardiac pacemaker     Thrombocytopenia (H)     Hiatal hernia     Splenic artery aneurysm (H)     Pulmonary fibrosis (H)     Benign essential hypertension     Pulmonary nodules     CKD (chronic kidney disease) stage 3, GFR 30-59 ml/min (H)     Other emphysema (H)     Acute GI bleeding     Duodenal ulcer     Acute hyponatremia     Hyponatremia     Acute CHF (congestive heart failure) (H)     Past Surgical History:   Procedure Laterality Date     APPENDECTOMY       ARTHROSCOPY KNEE      RIGHT - Osteoarthritis     C TOTAL KNEE ARTHROPLASTY      LEFT - Osteoarthritis - Ramírez Ruvalcaba     C TOTAL KNEE ARTHROPLASTY      RIGHT - Osteoarthritis - Ramírez Ruvalcaba     CATARACT EXTRACTION and LENS IMPLANTATION      BILATERAL     COLONOSCOPY       Colonoscopy with Polypectomy      GI BLEED - DR. DEL CASTILLO     ESOPHAGOSCOPY, GASTROSCOPY, DUODENOSCOPY (EGD), COMBINED N/A 2019    Procedure: UPPER ENDOSCOPY WITH BIOPSY;  Surgeon: Jorge Freeman MD;  Location: [facility]            HC REMOVAL OF OVARIAN CYST(S)       HYSTERECTOMY, YANG      Metorrhagia     IMPLANT PACEMAKER  2016     Left Subclavian Line, Triple Lumen  2012    Gastric Ulcer, Dieulfoy lesion, hemostatic clips placed - Massive GI Bleed - Transferred to [facility]     RELEASE CARPAL TUNNEL      RIGHT - Carpal Tunnel Syndrome     REPAIR BLADDER       SIGMOIDOSCOPY FLEXIBLE N/A 2019    Procedure: FLEXIBLE SIGMOIDOSCOPY;  Surgeon: Jorge Freeman MD;  Location: [facility]       Social History     Tobacco Use     Smoking status: Never Smoker     Smokeless tobacco: Never Used   Substance Use Topics     Alcohol use: No     Alcohol/week: 0.0 standard drinks     Family History   Problem Relation Age of Onset     Cancer  Brother         Pancreatic     Cancer Brother         Prostate     Cancer Sister         Breast     Hypertension Brother      Hypertension Father      Hypertension Sister      Hypertension Mother      Aneurysm Daughter 68        cerebral, sudden death     Osteoporosis Other      Thyroid Disease No family hx of      Diabetes No family hx of          Current Outpatient Medications   Medication Sig Dispense Refill     acetaminophen 500 MG PO tablet Take 1,000 mg by mouth as needed       furosemide (LASIX) 20 MG tablet Take 1 tablet (20 mg) by mouth daily 90 tablet 3     isosorbide mononitrate (IMDUR) 30 MG 24 hr tablet TAKE ONE TABLET BY MOUTH AT BEDTIME. 90 tablet 3     LORazepam (ATIVAN) 0.5 MG tablet TAKE 1 TABLET BY MOUTH EVERY 8 HOURS AS NEEDED FOR ANXIETY 30 tablet 0     Multiple Vitamins-Minerals (MULTIVITAMIN ADULT PO) Take 1 tablet by mouth daily (Patient uses Heart Healthy Multivitamin, Dr. Lucy lakhani)       pantoprazole (PROTONIX) 40 MG EC tablet TAKE 1 TABLET BY MOUTH ONCE DAILY. 30 tablet 3     nitroGLYcerin (NITROSTAT) 0.4 MG sublingual tablet Place 1 tablet (0.4mg) by sublingual route at the first sign of attack; may repeat ever 5 min until relief; if pain persists after 3 tablets in 15 minutes prompt medical attention is recommended. AS NEEDED (Patient not taking: Reported on 7/21/2020) 25 tablet 3     Allergies   Allergen Reactions     [place] Juice Other (See Comments), Nausea and GI Disturbance     Vertigo     Food      Oranges.     Naprosyn [Naproxen]      Incontinence       Niacin Swelling     BP Readings from Last 3 Encounters:   07/21/20 130/60   06/15/20 (!) 158/74   06/05/20 175/83    Wt Readings from Last 3 Encounters:   07/21/20 56.2 kg (124 lb)   06/15/20 54.4 kg (120 lb)   02/19/20 51.3 kg (113 lb 1.5 oz)                      Reviewed and updated as needed this visit by Provider         Review of Systems   Constitutional, HEENT, cardiovascular, pulmonary, gi and gu systems are negative,  "except as otherwise noted.      Objective    /60   Pulse 61   Temp 97.1  F (36.2  C) (Tympanic)   Resp 19   Ht 1.473 m (4' 10\")   Wt 56.2 kg (124 lb)   SpO2 95%   BMI 25.92 kg/m    Body mass index is 25.92 kg/m .  Physical Exam   GENERAL: healthy, alert and no distress  HENT: ear canals and TM's normal, nose and mouth without ulcers or lesions, scab of the medial right mucous membranes  NECK: no adenopathy, no asymmetry, masses, or scars and thyroid normal to palpation  RESP: lungs clear to auscultation - no rales, rhonchi or wheezes  CV: regular rate and rhythm, normal S1 S2, no S3 or S4, no murmur, click or rub, no peripheral edema and peripheral pulses strong  MS: no gross musculoskeletal defects noted, no edema  NEURO: Normal strength and tone, mentation intact and speech normal  PSYCH: mentation appears normal, affect normal/bright            Assessment & Plan     1. Benign essential hypertension  Controlled, check labs today  - Basic metabolic panel    2. Thrombocytopenia (H)  Due for labs  - CBC with platelets and differential    3. Epistaxis  No recent bleeding. She is advised to use ocean spray to the nose daily and triple antibiotic ointment just to the outside to keep this moisturized     4. CKD (chronic kidney disease) stage 3, GFR 30-59 ml/min (H)  Check lab today    5. Other emphysema (H)  Stable     6. Splenic artery aneurysm (H)  stable       BMI:   Estimated body mass index is 25.92 kg/m  as calculated from the following:    Height as of this encounter: 1.473 m (4' 10\").    Weight as of this encounter: 56.2 kg (124 lb).               Return in about 6 months (around 1/21/2021) for BP Recheck.    Vandana Jones MD  [facility] [place]      "

## 2020-07-15 NOTE — PLAN OF CARE
PATIENT CALLED OFFICE AND WAS GIVEN INFO REGARDING APPOINTMENT WITH DR. LORI KATHLEEN FOR September.    PATIENT STATES SHE CAN NOT TAKE ANY RHEUMATOLOGY MEDICATIONS BECAUSE SHE ALMOST BLED OUT YEARS AGO.  IS THERE ANYWAY SHE CAN SEE DR. BURTON  WITHOUT SEEING A RHEUMATOLOGIST.    SHE IS CURRENTY SEEING A PAIN DOCTOR IN [place] AND IT IS WEARING HER DOWN.  CALL HER ABOUT THIS ASAP [phone]   VSS on room air. Denies pain. No skin issues. Lung sounds clear. Up to bathroom frequently with some dribbling. IV infusing, WDL. Alarms in place as pt does not always use call light.     Face to face report given with opportunity to observe patient.    Report given to HEAVEN Matson RN   10/17/2019  6:57 AM

## 2020-07-21 ENCOUNTER — OFFICE VISIT (OUTPATIENT)
Dept: FAMILY MEDICINE | Facility: OTHER | Age: 85
End: 2020-07-21
Attending: FAMILY MEDICINE
Payer: COMMERCIAL

## 2020-07-21 VITALS
DIASTOLIC BLOOD PRESSURE: 60 MMHG | SYSTOLIC BLOOD PRESSURE: 130 MMHG | HEART RATE: 61 BPM | BODY MASS INDEX: 26.03 KG/M2 | WEIGHT: 124 LBS | HEIGHT: 58 IN | OXYGEN SATURATION: 95 % | TEMPERATURE: 97.1 F | RESPIRATION RATE: 19 BRPM

## 2020-07-21 DIAGNOSIS — R04.0 EPISTAXIS: ICD-10-CM

## 2020-07-21 DIAGNOSIS — I10 BENIGN ESSENTIAL HYPERTENSION: Primary | ICD-10-CM

## 2020-07-21 DIAGNOSIS — D69.6 THROMBOCYTOPENIA (H): ICD-10-CM

## 2020-07-21 DIAGNOSIS — I72.8 SPLENIC ARTERY ANEURYSM (H): ICD-10-CM

## 2020-07-21 DIAGNOSIS — N18.30 CKD (CHRONIC KIDNEY DISEASE) STAGE 3, GFR 30-59 ML/MIN (H): ICD-10-CM

## 2020-07-21 DIAGNOSIS — J43.8 OTHER EMPHYSEMA (H): ICD-10-CM

## 2020-07-21 LAB
ANION GAP SERPL CALCULATED.3IONS-SCNC: 7 MMOL/L (ref 3–14)
BASOPHILS # BLD AUTO: 0 10E9/L (ref 0–0.2)
BASOPHILS NFR BLD AUTO: 0.7 %
BUN SERPL-MCNC: 32 MG/DL (ref 7–30)
CALCIUM SERPL-MCNC: 9.6 MG/DL (ref 8.5–10.1)
CHLORIDE SERPL-SCNC: 104 MMOL/L (ref 94–109)
CO2 SERPL-SCNC: 26 MMOL/L (ref 20–32)
CREAT SERPL-MCNC: 1.03 MG/DL (ref 0.52–1.04)
DIFFERENTIAL METHOD BLD: ABNORMAL
EOSINOPHIL # BLD AUTO: 0.1 10E9/L (ref 0–0.7)
EOSINOPHIL NFR BLD AUTO: 1.7 %
ERYTHROCYTE [DISTWIDTH] IN BLOOD BY AUTOMATED COUNT: 17 % (ref 10–15)
GFR SERPL CREATININE-BSD FRML MDRD: 46 ML/MIN/{1.73_M2}
GLUCOSE SERPL-MCNC: 95 MG/DL (ref 70–99)
HCT VFR BLD AUTO: 35.1 % (ref 35–47)
HGB BLD-MCNC: 11.5 G/DL (ref 11.7–15.7)
IMM GRANULOCYTES # BLD: 0 10E9/L (ref 0–0.4)
IMM GRANULOCYTES NFR BLD: 0.5 %
LYMPHOCYTES # BLD AUTO: 1.2 10E9/L (ref 0.8–5.3)
LYMPHOCYTES NFR BLD AUTO: 20.4 %
MCH RBC QN AUTO: 25.9 PG (ref 26.5–33)
MCHC RBC AUTO-ENTMCNC: 32.8 G/DL (ref 31.5–36.5)
MCV RBC AUTO: 79 FL (ref 78–100)
MONOCYTES # BLD AUTO: 0.6 10E9/L (ref 0–1.3)
MONOCYTES NFR BLD AUTO: 10.1 %
NEUTROPHILS # BLD AUTO: 4 10E9/L (ref 1.6–8.3)
NEUTROPHILS NFR BLD AUTO: 66.6 %
NRBC # BLD AUTO: 0 10*3/UL
NRBC BLD AUTO-RTO: 0 /100
PLATELET # BLD AUTO: 299 10E9/L (ref 150–450)
POTASSIUM SERPL-SCNC: 4.2 MMOL/L (ref 3.4–5.3)
RBC # BLD AUTO: 4.44 10E12/L (ref 3.8–5.2)
SODIUM SERPL-SCNC: 137 MMOL/L (ref 133–144)
WBC # BLD AUTO: 5.9 10E9/L (ref 4–11)

## 2020-07-21 PROCEDURE — G0463 HOSPITAL OUTPT CLINIC VISIT: HCPCS

## 2020-07-21 PROCEDURE — 36415 COLL VENOUS BLD VENIPUNCTURE: CPT | Mod: ZL | Performed by: FAMILY MEDICINE

## 2020-07-21 PROCEDURE — 99213 OFFICE O/P EST LOW 20 MIN: CPT | Performed by: FAMILY MEDICINE

## 2020-07-21 PROCEDURE — 80048 BASIC METABOLIC PNL TOTAL CA: CPT | Mod: ZL | Performed by: FAMILY MEDICINE

## 2020-07-21 PROCEDURE — 85025 COMPLETE CBC W/AUTO DIFF WBC: CPT | Mod: ZL | Performed by: FAMILY MEDICINE

## 2020-07-21 ASSESSMENT — MIFFLIN-ST. JEOR: SCORE: 852.21

## 2020-07-21 ASSESSMENT — PAIN SCALES - GENERAL: PAINLEVEL: NO PAIN (0)

## 2020-07-21 NOTE — NURSING NOTE
"Chief Complaint   Patient presents with     Hypertension     Derm Problem       Initial /60   Pulse 61   Temp 97.1  F (36.2  C) (Tympanic)   Resp 19   Ht 1.473 m (4' 10\")   Wt 56.2 kg (124 lb)   SpO2 95%   BMI 25.92 kg/m   Estimated body mass index is 25.92 kg/m  as calculated from the following:    Height as of this encounter: 1.473 m (4' 10\").    Weight as of this encounter: 56.2 kg (124 lb).  Medication Reconciliation: complete  Lucero Mckeon LPN    "

## 2020-07-22 DIAGNOSIS — F41.9 ANXIETY: ICD-10-CM

## 2020-07-22 NOTE — TELEPHONE ENCOUNTER
Lorazepam 0.5 MG      Last Written Prescription Date:  5-  Last Fill Quantity: 30,   # refills: 0  Last Office Visit: 7-  Future Office visit:

## 2020-07-23 RX ORDER — LORAZEPAM 0.5 MG/1
TABLET ORAL
Qty: 30 TABLET | Refills: 0 | Status: SHIPPED | OUTPATIENT
Start: 2020-07-23 | End: 2020-08-05

## 2020-08-05 DIAGNOSIS — F41.9 ANXIETY: ICD-10-CM

## 2020-08-05 RX ORDER — LORAZEPAM 0.5 MG/1
TABLET ORAL
Qty: 30 TABLET | Refills: 0 | Status: SHIPPED | OUTPATIENT
Start: 2020-08-05 | End: 2020-01-01

## 2020-08-05 NOTE — TELEPHONE ENCOUNTER
ativan      Last Written Prescription Date:  7.23.2020  Last Fill Quantity: 30,   # refills: 0  Last Office Visit: 7.21.2020

## 2020-08-18 DIAGNOSIS — I25.9 CHRONIC ISCHEMIC HEART DISEASE: ICD-10-CM

## 2020-08-18 RX ORDER — ISOSORBIDE MONONITRATE 30 MG/1
TABLET, EXTENDED RELEASE ORAL
Qty: 90 TABLET | Refills: 1 | Status: SHIPPED | OUTPATIENT
Start: 2020-08-18 | End: 2021-01-01

## 2020-08-18 NOTE — TELEPHONE ENCOUNTER
isosorbide mononitrate      Last Written Prescription Date:  5/21/19  Last Fill Quantity: 90,   # refills: 3  Last Office Visit: 7/21/20  Future Office visit:       Routing refill request to provider for review/approval because:

## 2020-09-24 NOTE — TELEPHONE ENCOUNTER
LORAZEPAM 0.5MG TABLET       Last Written Prescription Date:  8/5/20  Last Fill Quantity: 30,   # refills: 0  Last Office Visit: 7/21/20  Future Office visit:       Routing refill request to provider for review/approval because:    Drug not on the [facility], [facility] or [facility] refill protocol or controlled substance

## 2020-10-01 NOTE — TELEPHONE ENCOUNTER
Protonix      Last Written Prescription Date:  5.22.2020  Last Fill Quantity: 30,   # refills: 3  Last Office Visit: 07.21.2020

## 2020-10-02 NOTE — TELEPHONE ENCOUNTER
Pt is wondering if she needs the flu shot?    Please call her [phone]      Muna Huerta RN

## 2020-10-02 NOTE — TELEPHONE ENCOUNTER
Yes I would recommend the flu shot. She can get this at [facility] or [facility] at this time as we don't have an option yet to give flu shots

## 2020-11-10 NOTE — ED TRIAGE NOTES
Pt recently treated for UTI, pt is reported she is having some retention and when she does go it burns.

## 2020-11-10 NOTE — TELEPHONE ENCOUNTER
Left message to call clinic back and schedule appointment on 11/12/2020 at 4:30 with Dr. Radha Jones.

## 2020-11-10 NOTE — TELEPHONE ENCOUNTER
Patient has finished her septra and is still having symptoms. Unable to urinate, has not yet today. Please advise.  Please call [phone]

## 2020-11-10 NOTE — ED PROVIDER NOTES
History     Chief Complaint   Patient presents with     Urinary Retention     Rule out Urinary Tract Infection     HPI  Lauren Barron is a 89 year old female who is brought in per her daughter for inability to urinate and burning with urination. She states she has not voided since yesterday morning (approximately 24 hours). She was recently treated with Bactrim for a UTI.  She was started on Thursday and completed the medication Monday. Her daughter thinks she may have doubled up on doses, at times. Had small BM yesterday. Has not taken any OTC medications.History of hysterectomy.  Denies fevers, chills, nausea, vomiting, diarrhea, and shortness of breath.    Allergies:  Allergies   Allergen Reactions     [place] Juice Other (See Comments), Nausea and GI Disturbance     Vertigo     Food      Oranges.     Naprosyn [Naproxen]      Incontinence       Niacin Swelling       Problem List:    Patient Active Problem List    Diagnosis Date Noted     Acute CHF (congestive heart failure) (H) 02/19/2020     Priority: Medium     Acute hyponatremia 10/14/2019     Priority: Medium     Hyponatremia 10/14/2019     Priority: Medium     Duodenal ulcer 10/03/2019     Priority: Medium     Acute GI bleeding 09/25/2019     Priority: Medium     Other emphysema (H) 10/29/2018     Priority: Medium     CKD (chronic kidney disease) stage 3, GFR 30-59 ml/min (H) 11/20/2017     Priority: Medium     Pulmonary nodules 06/08/2017     Priority: Medium     One, left lung, repeat chest CT 6 months, 12/2017       Benign essential hypertension 12/05/2016     Priority: Medium     Splenic artery aneurysm (H) 10/06/2016     Priority: Medium     Pulmonary fibrosis (H) 10/06/2016     Priority: Medium     Hiatal hernia 09/08/2016     Priority: Medium     Sick sinus syndrome (H) 06/14/2016     Priority: Medium     Pacemaker placed 4/2016       Presence of cardiac pacemaker 03/28/2016     Priority: Medium     Overview:   [place] [address] [id number]  SN#[id number]   3/28/2016  Atr lead [place] 4469  #[id number]  3/28/2016  Vent lead [place] 4470  SN#[id number]  3/28/2016  Dr Bradley  Bradycardia       Coronary arteriosclerosis in native artery 03/15/2016     Priority: Medium     Osteoarthritis 09/21/2015     Priority: Medium     Moderate aortic insufficiency 06/02/2015     Priority: Medium     moderate, aortic sclerosis without stenosis  echo 5/6/2015       Moderate mitral insufficiency 06/02/2015     Priority: Medium     moderate, echo 5/6/2015       Bradycardia 05/05/2015     Priority: Medium     Due to Metoprolol and amlodipine       Epistaxis, recurrent 05/01/2013     Priority: Medium     Mammogram declined 12/20/2012     Priority: Medium     Epistaxis 10/20/2012     Priority: Medium     Gastrointestinal hemorrhage 10/16/2012     Priority: Medium     Thrombocytopenia (H) 10/16/2012     Priority: Medium     Acute posthemorrhagic anemia 10/15/2012     Priority: Medium     Cystocele 03/22/2012     Priority: Medium     Advance Care Planning 03/16/2012     Priority: Medium     Advance Care Planning 8/31/2015: Receipt of ACP document:  Received: Health Care Directive which was witnessed or notarized on 8/20/15.  Document previously scanned on 8/28/15.  Validation form completed and scanned.  Code Status reflects choices in most recent ACP document.  Confirmed/documented designated decision maker(s).  Added by Kerrie Joaquin RN, BSN, MA, Advance Care Planning Liaison.  Advance Care Planning 8/5/2015: Receipt of ACP document:  Received: POLST which was signed and dated by provider on 7-21-15.  Document previously scanned on 7-22-15.  Order reviewed and found to be valid.  Code Status reflects choices in most recent ACP document.  Confirmed/documented designated decision maker(s).  Added by Jessica Gergorio RN Advance Care Planning Liaison with [employer]  Advance Care Planning 8/5/2015: Receipt of ACP document:  Received: invalid HCD document dated 2-22-15.  Document not previously  scanned. Validation form completed indicating invalid document. Validation form sent to be scanned as notation of invalid document received.  Code Status reflects choices in most recent ACP document.  Confirmed/documented designated decision maker(s).  Added by Jessica Gregorio RN Advance Care Planning Liaison with [employer]       Diastolic dysfunction 03/12/2012     Priority: Medium     Atherosclerosis of aorta (H) 03/12/2012     Priority: Medium     ECHO 2/2012       GI bleed 07/25/2011     Priority: Medium     EGD-H Pylori, treated, Colonoscopy small Polyp - transfused 3 units of blood.       Allergic rhinitis 07/25/2011     Priority: Medium     Problem list name updated by automated process. Provider to review       Meniere's disease 07/25/2011     Priority: Medium     Problem list name updated by automated process. 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 Provider to review       Essential hypertension 12/06/1999     Priority: Medium     Problem list name updated by automated process. Provider to review          Past Medical History:    Past Medical History:   Diagnosis Date     Allergic rhinitis, cause unspecified 07/25/2011     Aneurysm of splenic artery (H) 03/31/2014     Aortic insufficiency 6/2/2015     ASCVD (arteriosclerotic cardiovascular disease) 08/25/2009     Atherosclerosis of aorta (H) 03/12/2012     Cystocele 03/22/2012     Diastolic dysfunction 03/12/2012     GI bleed 07/25/2011     Hyperlipidemia 12/02/2003     Hypertension 12/06/1999     Mammogram declined 12/20/2012      Meniere's disease, unspecified 2011     Mitral regurgitation 2015     Osteoarthritis 2000     Sick sinus syndrome (H) 2016     Squamous cell skin cancer, chiquita nugent 2006     Transient global amnesia 2004       Past Surgical History:    Past Surgical History:   Procedure Laterality Date     APPENDECTOMY       ARTHROSCOPY KNEE      RIGHT - Osteoarthritis     C TOTAL KNEE ARTHROPLASTY      LEFT - Osteoarthritis - Ramírez Ruvalcaba TOTAL KNEE ARTHROPLASTY      RIGHT - Osteoarthritis - Ram[facility]rez Ruvalcaba     CATARACT EXTRACTION and LENS IMPLANTATION      BILATERAL     COLONOSCOPY       Colonoscopy with Polypectomy      GI BLEED - DR. DEL CASTILLO     ESOPHAGOSCOPY, GASTROSCOPY, DUODENOSCOPY (EGD), COMBINED N/A 2019    Procedure: UPPER ENDOSCOPY WITH BIOPSY;  Surgeon: Jorge Freeman MD;  Location: [facility]             REMOVAL OF OVARIAN CYST(S)       HYSTERECTOMY, YANG      Metorrhagia     IMPLANT PACEMAKER  2016     Left Subclavian Line, Triple Lumen      Gastric Ulcer, Dieulfoy lesion, hemostatic clips placed - Massive GI Bleed - Transferred to [facility]     RELEASE CARPAL TUNNEL      RIGHT - Carpal Tunnel Syndrome     REPAIR BLADDER       SIGMOIDOSCOPY FLEXIBLE N/A 2019    Procedure: FLEXIBLE SIGMOIDOSCOPY;  Surgeon: Jorge Freeman MD;  Location: [facility]       Family History:    Family History   Problem Relation Age of Onset     Cancer Brother         Pancreatic     Cancer Brother         Prostate     Cancer Sister         Breast     Hypertension Brother      Hypertension Father      Hypertension Sister      Hypertension Mother      Aneurysm Daughter 68        cerebral, sudden death     Osteoporosis Other      Thyroid Disease No family hx of      Diabetes No family hx of        Social History:  Marital Status:   [5]  Social History     Tobacco Use     Smoking status: Never Smoker     Smokeless tobacco: Never Used   Substance Use  Topics     Alcohol use: No     Alcohol/week: 0.0 standard drinks     Drug use: No        Medications:         acetaminophen 500 MG PO tablet       furosemide (LASIX) 20 MG tablet       isosorbide mononitrate (IMDUR) 30 MG 24 hr tablet       LORazepam (ATIVAN) 0.5 MG tablet       Multiple Vitamins-Minerals (MULTIVITAMIN ADULT PO)       nitroGLYcerin (NITROSTAT) 0.4 MG sublingual tablet       pantoprazole (PROTONIX) 40 MG EC tablet          Review of Systems   Constitutional: Positive for activity change. Negative for chills and fever.   Respiratory: Negative for shortness of breath.    Gastrointestinal: Negative for abdominal pain, diarrhea, nausea and vomiting.   Genitourinary: Positive for difficulty urinating and dysuria (burning). Negative for frequency, hematuria, pelvic pain, vaginal bleeding, vaginal discharge and vaginal pain.   Musculoskeletal: Negative for back pain.   Skin: Negative.    Neurological: Negative for dizziness, light-headedness and headaches.       Physical Exam   BP: (!) 191/86(BP  cuff had to inflate a couple times and pt was moving arm)  Pulse: 60  Temp: 97.4  F (36.3  C)  Resp: 18  SpO2: 99 %      Physical Exam  Vitals signs and nursing note reviewed.   Constitutional:       General: She is not in acute distress.     Appearance: She is normal weight.   HENT:      Head: Normocephalic.   Cardiovascular:      Rate and Rhythm: Normal rate and regular rhythm.      Heart sounds: Normal heart sounds. No murmur.   Pulmonary:      Effort: Pulmonary effort is normal. No respiratory distress.      Breath sounds: Normal breath sounds. No wheezing.   Abdominal:      General: Abdomen is flat. Bowel sounds are normal. There is no distension.      Palpations: Abdomen is soft.      Tenderness: There is abdominal tenderness. There is no right CVA tenderness or left CVA tenderness.      Comments: Firm over pubic ramus with palpation. No tenderness with palpation. Bladder  and 159 before micturition and  115 to 129 after micturition.    Skin:     General: Skin is warm and dry.   Neurological:      Mental Status: She is alert and oriented to person, place, and time.   Psychiatric:         Behavior: Behavior normal.         ED Course        Procedures             Results for orders placed or performed during the hospital encounter of 11/10/20 (from the past 24 hour(s))   UA with Microscopic reflex to Culture    Specimen: Midstream Urine   Result Value Ref Range    Color Urine Straw     Appearance Urine Clear     Glucose Urine Negative NEG^Negative mg/dL    Bilirubin Urine Negative NEG^Negative    Ketones Urine Negative NEG^Negative mg/dL    Specific Gravity Urine 1.005 1.003 - 1.035    Blood Urine Negative NEG^Negative    pH Urine 6.5 4.7 - 8.0 pH    Protein Albumin Urine Negative NEG^Negative mg/dL    Urobilinogen mg/dL Normal 0.0 - 2.0 mg/dL    Nitrite Urine Negative NEG^Negative    Leukocyte Esterase Urine Negative NEG^Negative    Source Midstream Urine     WBC Urine 0 0 - 5 /HPF    RBC Urine <1 0 - 2 /HPF    Bacteria Urine None (A) NEG^Negative /HPF    Squamous Epithelial /HPF Urine 1 0 - 1 /HPF    Mucous Urine Present (A) NEG^Negative /LPF   Basic metabolic panel   Result Value Ref Range    Sodium 125 (L) 133 - 144 mmol/L    Potassium 4.2 3.4 - 5.3 mmol/L    Chloride 94 94 - 109 mmol/L    Carbon Dioxide 24 20 - 32 mmol/L    Anion Gap 7 3 - 14 mmol/L    Glucose 89 70 - 99 mg/dL    Urea Nitrogen 28 7 - 30 mg/dL    Creatinine 1.13 (H) 0.52 - 1.04 mg/dL    GFR Estimate 41 (L) >60 mL/min/[1.73_m2]    GFR Estimate If Black 48 (L) >60 mL/min/[1.73_m2]    Calcium 9.0 8.5 - 10.1 mg/dL       Medications - No data to display    Assessments & Plan (with Medical Decision Making)     I have reviewed the nursing notes.    I have reviewed the findings, diagnosis, plan and need for follow up with the patient.  (E87.1) Hyponatremia possibly related to Bactrim (side effect).  Comment: Sodium 125. Review of chart notes she has  history of hyponatremia in previous hospitalization.    (N17.9) Acute kidney injury (H) possibly related to poor fluid intake  Comment: 89 year old female who is brought in per her daughter for inability to urinate and burning with urination. She states she has not voided since yesterday morning (approximately 24 hours). She was recently treated with Bactrim for a UTI.  She was started on Thursday and completed the medication Monday. Her daughter thinks she may have doubled up on doses, at times. Had small BM yesterday. Has not taken any OTC medications. History of hysterectomy. Denies fevers, chills, nausea, vomiting, diarrhea, and shortness of breath.     Assessment: NHT. Lungs CTA. Abdominal assessment negative except for:  Firm over pubic ramus with palpation. No tenderness with palpation. Bladder  and 159 before micturition and 115 to 129 after micturition. She voided three times during this encounter.     UA negative  BMP: low sodium 125; creatinine 1.13 and GFR 41.    Plan: education provided for RAFAT and hyponatremia.  You need to drink 6 (8) ounce glasses of water today and tomorrow. Follow-up with PCP in two days.  These discharge instructions and medications were reviewed with her and her daughter and understanding verbalized.    Discharge Medication List as of 11/10/2020  1:09 PM          Final diagnoses:   Hyponatremia   Acute kidney injury (H) - related to use of antibiotic       11/10/2020   [facility]       Sandra Jeff, CNP  11/11/20 2004       Sandra Jeff CNP  11/11/20 2035

## 2020-11-10 NOTE — TELEPHONE ENCOUNTER
Patient has had 2 glasses of water since being home from ER. Patient has been voided 5-6 times since being seen in the ER. Patient is supposed to be seen in clinic on or around 11/12/2020. Call Raeann Carrion at [phone] to schedule an appointment.

## 2020-11-10 NOTE — ED TRIAGE NOTES
"Pt is here was c/o \"not going\"   Family member reports pt was abx recently for a UTI took last dose yesterday  Pt denies any burning  "

## 2020-11-10 NOTE — ED AVS SNAPSHOT
[facility]  [address] [address]  Phone: [phone]                                    Lauren Barron   MRN: [id number]    Department: [facility]   Date of Visit: 11/10/2020           After Visit Summary Signature Page    I have received my discharge instructions, and my questions have been answered. I have discussed any challenges I see with this plan with the nurse or doctor.    ..........................................................................................................................................  Patient/Patient Representative Signature      ..........................................................................................................................................  Patient Representative Print Name and Relationship to Patient    ..................................................               ................................................  Date                                   Time    ..........................................................................................................................................  Reviewed by Signature/Title    ...................................................              ..............................................  Date                                               Time          22EPIC Rev 08/18

## 2020-11-12 PROBLEM — G93.41 METABOLIC ENCEPHALOPATHY: Status: ACTIVE | Noted: 2020-01-01

## 2020-11-12 NOTE — PROGRESS NOTES
Subjective     Lauren Barron is a 89 year old female who presents to clinic today for the following health issues:    HPI         ED/UC Followup:    Facility:  [facility] [place]  Date of visit: 11/10/2020  Reason for visit: hyponatremia, acute kidney injury  Current Status: pt has been drinking fluids and stated it seems to be improving      Her daughter is with her tonight and notes that she has been more confused with this UTI. She has a history of hyponatremia in the past, worked up and felt to be SIADH. She is normal alert and oriented. She has been pushing fluids at home. Raeann notes that she has been significantly confused the last few days which is not like her. No pelvic cramping or dysuria         Review of Systems   Constitutional, HEENT, cardiovascular, pulmonary, gi and gu systems are negative, except as otherwise noted.      Objective    /76 (BP Location: Left arm, Patient Position: Sitting, Cuff Size: Adult Regular)   Pulse 60   Temp 96.3  F (35.7  C)   Wt 55.7 kg (122 lb 12.8 oz)   SpO2 98%   BMI 25.67 kg/m    Body mass index is 25.67 kg/m .  Physical Exam   GENERAL: healthy, alert and no distress  NECK: no adenopathy, no asymmetry, masses, or scars and thyroid normal to palpation  RESP: lungs clear to auscultation - no rales, rhonchi or wheezes  CV: regular rate and rhythm, normal S1 S2, no S3 or S4, no murmur, click or rub, no peripheral edema and peripheral pulses strong  MS: no gross musculoskeletal defects noted, no edema  NEURO: Normal strength and tone, mentation intact and speech normal  PSYCH: concentration poor and confused            Assessment & Plan     Hyponatremia  Now at 120 down form 125 2 days ago. Will admit for treatment, discussed with Dr Toscano for admission. Recheck osmolalities and urine sodium along with chest xray   - Basic metabolic panel  - XR CHEST 2 VW (Clinic Performed); Future  - Osmolarity ( Serum)  - Sodium random urine  - Osmolality urine    RAFAT (acute kidney  injury) (H)  Resolved with pushing fluids   - Basic metabolic panel    Acute cystitis without hematuria  Resolved   - UA reflex to Microscopic and Culture - [place]            No follow-ups on file.    Vandana Jones MD  [facility] [place]

## 2020-11-12 NOTE — NURSING NOTE
"Chief Complaint   Patient presents with     ER F/U       Initial /76 (BP Location: Left arm, Patient Position: Sitting, Cuff Size: Adult Regular)   Pulse 60   Temp 96.3  F (35.7  C)   Wt 55.7 kg (122 lb 12.8 oz)   SpO2 98%   BMI 25.67 kg/m   Estimated body mass index is 25.67 kg/m  as calculated from the following:    Height as of 7/21/20: 1.473 m (4' 10\").    Weight as of this encounter: 55.7 kg (122 lb 12.8 oz).  Medication Reconciliation: nilsa Loco  "

## 2020-11-13 NOTE — PLAN OF CARE
Patient discharging. No concerns with her PTA medlist so I won't be disrupting discharge.    Jaki Breen on 11/13/2020 at 9:28 AM       Medication Dispense History (from 11/14/2019 to 11/12/2020)  Expand All  Collapse All  Azithromycin   Dispensed Days Supply Quantity Provider Pharmacy   AZITHROMYCIN 250 MG TABLET 02/20/2020 11 11 [facility] [place] ...         Furosemide   Dispensed Days Supply Quantity Provider Pharmacy   FUROSEMIDE 20 MG TABLET 08/18/2020 90 90 [facility] [place] ...   FUROSEMIDE 20 MG TABLET 06/01/2020 90 90 tablet JULES HORN [facility] [place] [facility] ...   FUROSEMIDE 20 MG TABLET 03/23/2020 90 [facility] [place] [facility] ...   FUROSEMIDE 20MG TABLETS 03/15/2020 7 7 Units WENDY POOL [facility] #...         Influenza Vac A&B SA Adj Quad   Dispensed Days Supply Quantity Provider Pharmacy   FLUAD QUAD [id number] PF INJ 10/28/2020 1 0.5 mL AVERY HODGSON Pharmacy...         Isosorbide Mononitrate   Dispensed Days Supply Quantity Provider Pharmacy   ISOSORBIDE MONONIT ER 30 MG TB 08/18/2020 90 90 tablet [facility] [place] [facility] ...   ISOSORBIDE MONONIT ER 30 MG TB 03/20/2020 90 90 tablet [facility] [place] ...   ISOSORBIDE MONONIT ER 30 MG TB 01/03/2020 90 90 tablet [facility] [place] [facility] ...         LORazepam   Dispensed Days Supply Quantity Provider Pharmacy   LORAZEPAM 0.5 MG TABLET 10/26/2020 10 30 tablet [facility] [place] ...   LORAZEPAM 0.5 MG TABLET 09/24/2020 10 30 tablet [facility] [place] ...   LORAZEPAM 0.5 MG TABLET 08/06/2020 10 30 tablet [facility] [place] ...   LORAZEPAM 0.5 MG TABLET 07/23/2020 10 30 tablet [facility] [place] ...   LORAZEPAM 0.5 MG TABLET 06/01/2020 10 30 tablet [facility] ...   LORAZEPAM 0.5 MG TABLET 03/06/2020 10 30 tablet JULES HORN  [facility] ...   LORAZEPAM 0.5 MG TABLET 01/06/2020 10 [facility] ...   LORAZEPAM 0.5 MG TABLET 12/10/2019 10 30 tablet JULES HORN [facility] ...         Metoprolol Tartrate   Dispensed Days Supply Quantity Provider Pharmacy   METOPROLOL TARTRATE 50 MG TAB 02/20/2020 15 30 tablet MONIK BEAVERS [facility] ...         Pantoprazole Sodium   Dispensed Days Supply Quantity Provider Pharmacy   PANTOPRAZOLE SOD DR 40 MG TAB 10/29/2020 90 90 tablet JULES HORN [facility] ...   PANTOPRAZOLE SOD DR 40 MG TAB 10/01/2020 30 30 [facility] ...   PANTOPRAZOLE SOD DR 40 MG TAB 09/03/2020 30 30 tablet [facility] ...   PANTOPRAZOLE SOD DR 40 MG TAB 08/05/2020 30 30 tablet [facility] ...   PANTOPRAZOLE SOD DR 40 MG TAB 07/06/2020 30 30 tablet [facility] ...   PANTOPRAZOLE SOD DR 40 MG TAB 05/22/2020 30 30 tablet [facility] ...   PANTOPRAZOLE SOD DR 40 MG TAB 04/27/2020 30 30 tablet [facility] ...   PANTOPRAZOLE SOD DR 40 MG TAB 03/20/2020 30 60 tablet [facility] ...   PANTOPRAZOLE SOD DR 40 MG TAB 01/03/2020 30 60 tablet [facility] ...   PANTOPRAZOLE SOD DR 40 MG TAB 12/10/2019 30 60 [facility] ...         Sulfamethoxazole-Trimethoprim   Dispensed Days Supply Quantity Provider Pharmacy   SULFAMETHOXAZOLE-TMP DS TABLET 11/05/2020 5 10 tablet JULES HORN [facility] ...         levoFLOXacin   Dispensed Days Supply Quantity Provider Pharmacy   LEVOFLOXACIN 500 MG TABLET 03/16/2020 10 10 tablet JULES HORN [facility] ...

## 2020-11-13 NOTE — DISCHARGE SUMMARY
[facility]    Discharge Summary  Hospitalist    Date of Admission:  11/12/2020  Date of Discharge:  11/13/2020  Discharging Provider: Nupur Epperson MD  Date of Service (when I saw the patient): 11/13/20    Discharge Diagnoses   Principal Problem:    Hyponatremia (10/14/2019)  Active Problems:    ASCVD (arteriosclerotic cardiovascular disease) (8/25/2009)    Diastolic dysfunction (3/12/2012)    Presence of cardiac pacemaker (3/28/2016)    Metabolic encephalopathy (11/12/2020)    Chronic anemia (11/12/2020)      History of Present Illness   Lauren Barron is an 89 year old female who presented with hyponatremia.  Please see admission H+P for additional details.    Hospital Course   Lauren Barron was admitted on 11/12/2020. 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  She was said to be more confused, but patient was oriented x 3 and was conversant and interactive.  She states that she has had problems with memory lately.  She is also on chronic lasix.  She was euvolemic.  She was placed on NS at 50 CC/hr, and her Na improved to 128.  She feels at her baseline now.  We will decrease her home lasix from 20 mg to 10 mg daily, and have her f/u with her PCP in 2-3 days to recheck her Na level.  She is clinically stable to discharge.    Nupur Epperson MD      Significant Results and Procedures   See below.    Pending Results   These results will be followed up by Vandana Jones    Unresulted Labs Ordered in the Past 30 Days of this Admission     Date and Time Order Name Status Description    11/13/2020 0140 Asymptomatic COVID-19 Virus (Coronavirus) by PCR In process           Code Status   DNR / DNI       Primary Care Physician   Vandana Jones    Physical Exam   Temp: 98.1  F (36.7  C) Temp src: Temporal BP: 159/61 Pulse: 65   Resp: 18 SpO2: 99 % O2 Device: None (Room air)    Vitals:    11/13/20 0629   Weight: 54.3 kg (119 lb 11.4 oz)     Vital Signs with  Ranges  Temp:  [96.3  F (35.7  C)-98.1  F (36.7  C)] 98.1  F (36.7  C)  Pulse:  [60-65] 65  Resp:  [16-18] 18  BP: (132-187)/(55-76) 159/61  SpO2:  [98 %-99 %] 99 %  I/O last 3 completed shifts:  In: 240 [P.O.:240]  Out: -     Constitutional: AA, NAD  Eyes: PERRLA, no injection, no icterus  HEENT: atraumatic, normocephalic  Respiratory: CTA b/l  Cardiovascular: S1 S2 RRR  GI: soft, NT, ND, + bowel sounds  Lymph/Hematologic: no palpable lymphadenopathy  Skin: no rashes, no lesions  Musculoskeletal: No edema, good tone, no deformities  Neurologic: oriented x 3, no focal deficits  Psychiatric: appropriate affect    Discharge Disposition   Discharged to home  Condition at discharge: Stable    Consultations This Hospital Stay   OCCUPATIONAL THERAPY ADULT IP CONSULT    Time Spent on this Encounter   INupur MD, personally saw the patient today and spent greater than 30 minutes discharging this patient.    Discharge Orders      Reason for your hospital stay    Hyponatremia     Follow-up and recommended labs and tests     Follow up with primary care provider, Vandana Jones, within 2-3 days for hospital follow- up.  The following labs/tests are recommended: Na level.     Activity    Your activity upon discharge: activity as tolerated     No CPR- Do NOT Intubate     Diet    Follow this diet upon discharge: Orders Placed This Encounter      Regular Diet Adult     Discharge Medications   Current Discharge Medication List      CONTINUE these medications which have CHANGED    Details   furosemide (LASIX) 20 MG tablet Take 0.5 tablets (10 mg) by mouth daily  Qty: 90 tablet, Refills: 3    Associated Diagnoses: Chronic combined systolic and diastolic congestive heart failure (H)         CONTINUE these medications which have NOT CHANGED    Details   isosorbide mononitrate (IMDUR) 30 MG 24 hr tablet TAKE ONE TABLET BY MOUTH AT BEDTIME.  Qty: 90 tablet, Refills: 1    Associated Diagnoses: Chronic ischemic heart disease       Multiple Vitamins-Minerals (MULTIVITAMIN ADULT PO) Take 1 tablet by mouth daily (Patient uses Heart Healthy Multivitamin, Dr. Lucy lakhani)      pantoprazole (PROTONIX) 40 MG EC tablet TAKE 1 TABLET BY MOUTH ONCE DAILY.  Qty: 90 tablet, Refills: 1    Associated Diagnoses: Duodenal ulcer      acetaminophen 500 MG PO tablet Take 1,000 mg by mouth as needed      LORazepam (ATIVAN) 0.5 MG tablet TAKE 1 TABLET BY MOUTH EVERY 8 HOURS AS NEEDED FOR ANXIETY  Qty: 30 tablet, Refills: 0    Associated Diagnoses: Anxiety      nitroGLYcerin (NITROSTAT) 0.4 MG sublingual tablet Place 1 tablet (0.4mg) by sublingual route at the first sign of attack; may repeat ever 5 min until relief; if pain persists after 3 tablets in 15 minutes prompt medical attention is recommended. AS NEEDED  Qty: 25 tablet, Refills: 3    Associated Diagnoses: Chronic ischemic heart disease, unspecified           Allergies   Allergies   Allergen Reactions     [place] Juice Other (See Comments), Nausea and GI Disturbance     Vertigo     Food      Oranges.     Naprosyn [Naproxen]      Incontinence       Niacin Swelling     Data   Most Recent 3 CBC's:  Recent Labs   Lab Test 07/21/20  1130 03/14/20  2123 02/20/20  0516   WBC 5.9 7.6 6.8   HGB 11.5* 9.8* 10.0*   MCV 79 74* 72*    296 316      Most Recent 3 BMP's:  Recent Labs   Lab Test 11/13/20  0516 11/12/20  2300 11/12/20  1620   * 127* 120*   POTASSIUM 4.9 3.9 4.0   CHLORIDE 98 94 87*   CO2 23 24 24   BUN 23 27 25   CR 0.73 0.91 0.87   ANIONGAP 7 9 9   PEARL 8.6 8.6 8.5   GLC 91 94 116*     Most Recent 2 LFT's:  Recent Labs   Lab Test 10/24/19  1430 10/14/19  1410   AST 45 30   ALT 84* 35   ALKPHOS 118 55   BILITOTAL 0.3 0.4     Most Recent INR's and Anticoagulation Dosing History:  Anticoagulation Dose History     Recent Dosing and Labs Latest Ref Rng & Units 10/23/2009 10/24/2009 10/25/2009 10/26/2009    INR 0.86 - 1.14 1.09 1.15(H) 1.37(H) 1.36(H)        Most Recent 3 Troponin's:  Recent Labs    Lab Test 03/15/20  0059 03/14/20  2123 02/20/20  1117   TROPI 0.089* 0.065* 0.070*     Most Recent Cholesterol Panel:  Recent Labs   Lab Test 12/05/16  1000   CHOL 227*   *   HDL 54   TRIG 271*     Most Recent 6 Bacteria Isolates From Any Culture (See EPIC Reports for Culture Details):  Recent Labs   Lab Test 11/05/20  1130 02/19/20  1500 10/14/19  1720 10/14/19  1418 10/10/19  1245 09/25/19  1633   CULT >100,000 colonies/mL  Escherichia coli  * No MRSA isolated No MRSA isolated <10,000 colonies/mL  mixed urogenital neetu  No further identification or sensitivity done   >100,000 colonies/mL  Escherichia coli  * No MRSA isolated     Most Recent TSH, T4 and A1c Labs:  Recent Labs   Lab Test 10/29/18  1140   TSH 1.84     Results for orders placed or performed in visit on 11/12/20   XR CHEST 2 VW (Clinic Performed)    Narrative    PROCEDURE:  XR CHEST 2 VW    HISTORY:  Hyponatremia.     COMPARISON:  March 2020    FINDINGS:   The cardiac silhouette is normal in size. The pulmonary vasculature is  normal.  There is significant volume loss seen in the right middle  lobe.  No pleural effusion or pneumothorax. There is a moderate size  hiatal hernia. A transvenous pacemakers in place.      Impression    IMPRESSION:  Right middle lobe volume loss. Hiatal hernia.      GALLITO HENRY MD

## 2020-11-13 NOTE — PLAN OF CARE
A&O pleasant and cooperative. Denies any dizziness or confusion. VSS at this time. Denies pain. Voiding on the commode with frequency. Bed alarm active. Stand by assit. NS at 50mL/hr

## 2020-11-13 NOTE — PLAN OF CARE
Patient discharged at 11:40 AM via wheel chair accompanied by daughter and staff. Prescriptions - None ordered for discharge. All belongings sent with patient.     Discharge instructions reviewed with patient. Listed belongings gathered and returned to patient. yes    Patient discharged to home.   Report called to NA    Core Measures and Vaccines  Core Measures applicable during stay: NA. If yes, state diagnosis: NA  Pneumonia and Influenza given prior to discharge, if indicated: N/A    Surgical Patient   Surgical Procedures during stay: NA  Did patient receive discharge instruction on wound care and recognition of infection symptoms? N/A    MISC  Follow up appointment made:  Yes  Home and hospital aquired medications returned to patient: N/A  Patient reports pain was well managed at discharge: Yes

## 2020-11-13 NOTE — PLAN OF CARE
OT order received due to patient complaints of memory problems. Patient is discharging home today. OT went to discuss her current concerns/deficits. Patient was concerned about the financial part of an OT eval during her hospital stay. Due to patient being observation status, OT did not complete a formal eval. She was provided with 2 handouts for memory deficits, including tips to aid your memory and copy with memory loss. Patient was also educated in OP OT for cognition and was advised to discuss with her PCP and getting an OP order if the handouts provided don't address her needs. OT offered to write that information down, but patient declined and stated she would remember that. Will complete OT order.

## 2020-11-13 NOTE — DISCHARGE INSTRUCTIONS
A nurse from doctor Mian office will contact you today to schedule a follow up appointment in 2-3 days

## 2020-11-13 NOTE — H&P
[facility]    History and Physical  Hospitalist       Date of Admission:  11/12/2020    Assessment & Plan   Lauren Barron is a 89 year old female who presents from PCP office for evaluation and treatment of worsening hypoNa-billie and confusion.   Admitted for observation.     Principal Problem:    Hyponatremia    Assessment: H/o hyponatremia, but was never so low as today. H/o SIADH. Bactrim and excessive fluid intake both may contribute. Urine Na 37; U osm 308 and serum Osm 257. Serum osm < urine Osm. This means ADH is activated but she does not look volume overloaded. Chronic lung dz may also contribute.   Plan: Will check TSH, AM cortisol. Will check uric acid and Fena   Will give 250 NS bolus and check Na 11 PM    Active Problems:    ASCVD (arteriosclerotic cardiovascular disease)    Assessment: stable.     Plan: no intervention, only monitoring required      Diastolic dysfunction    Assessment: no signs of heart failure    Plan: monitor. Hold lasix. Continue imdur.      Presence of cardiac pacemaker    Assessment: placed due to SSS    Plan: Telemetry      Metabolic encephalopathy    Assessment: present on admission. Patient admits that she was having some memory problems for a few mo and it fluctuates. Low Na may exacerbate.     Plan: correct Na and reassess. Occupational therapy evaluation in AM      Chronic anemia    Assessment: Hg stable. Last admission her anemia was considered and was microcytic.     Plan: MCV normal today. Due to her age, doubt that she would benefit from extensive w/u unless hg will drop significantly    Abnormal chest XR  Assessment; scaring and nodules and previous CT and XR  Plan: not an acute problem. Primary will address.     DVT Prophylaxis: Pneumatic Compression Devices  Code Status: DNR / DNI    Disposition: Expected discharge in 1-2 days once Na corrected and safe discharge plan created.     Roger Branch    Primary Care Physician   Vandana Jones    Chief  "Complaint \" I am confused\".     Reason for Admission: Hyponatremia, cognitive impairment.     Admission status: Observation.     History is obtained from the patient, electronic health record and ISAURA Oliver    History of Present Illness   Lauren Barron is a 89 year old female with h/o hypoNa-billie, CKD stage 2-3, SIADH, UTI, ao insufficiency, HTN, diastolic heart dysfunction, abnormal chest XR, who was recently treated for UTI (bactrim used as ABx for a 3 days).  11.05.2020 diagnosed with acute cystitis w/o hematria.  11.10.2010 was in ED for Lower urinary sx, urinary retention. RAFAT was address. HypoNa-billie attributed to Bactrim. Instructed to drink more free water.   11.12.2020 seen in f/u by her PCP office for f/u. Patient was drinking fluid and seem to be improving. Daughter notes that she has been more confused with this UTI. Was normal alert and oriented in PCP office. Patient admits having memory problems for a few months, having good and bad days. Denies chest pain, sob, recent fever. During office vist Na rechecked and found 120.   Admitted for further w/o and Na correction.     12 elements of ROS obtained. Consitutional, HEENT, CV, pulmonary, GI,  RoS are -ve. Pertinent positives included in HPI.     Office Na 120. Serum osm 257; serum Na 37 Urine osm 308  Chest XR: R middle lobe volume loss. Hiatal hernia.     Past Medical History    I have reviewed this patient's medical history and updated it with pertinent information if needed.   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     Aortic insufficiency 6/2/2015    moderate, aortic sclerosis without stenosis echo 5/6/2015      ASCVD (arteriosclerotic cardiovascular disease) 08/25/2009    Bare metal stent obtuse margnial     Atherosclerosis of aorta (H) " 2012    ECHO 2012     Cystocele 2012     Diastolic dysfunction 2012     GI bleed 2011    EGD-H Pylori, treated, Colonoscopy small Polyp - transfused 3 units of blood.     Hyperlipidemia 2003     Hypertension 1999     Mammogram declined 2012     Meniere's disease, unspecified 2011     Mitral regurgitation 2015    moderate, echo 2015      Osteoarthritis 2000     Sick sinus syndrome (H) 2016    dual chamber pacemaker placement     Squamous cell skin cancer, ala nasi 2006    MOHS Surgery     Transient global amnesia 2004       Past Surgical History   I have reviewed this patient's surgical history and updated it with pertinent information if needed.  Past Surgical History:   Procedure Laterality Date     APPENDECTOMY       ARTHROSCOPY KNEE      RIGHT - Osteoarthritis     C TOTAL KNEE ARTHROPLASTY      LEFT - Osteoarthritis - Ram[facility]rez Ruvalcaba     C TOTAL KNEE ARTHROPLASTY      RIGHT - Osteoarthritis - Ramírez Ruvalcaba     CATARACT EXTRACTION and LENS IMPLANTATION      BILATERAL     COLONOSCOPY       Colonoscopy with Polypectomy      GI BLEED - DR. DEL CASTILLO     ESOPHAGOSCOPY, GASTROSCOPY, DUODENOSCOPY (EGD), COMBINED N/A 2019    Procedure: UPPER ENDOSCOPY WITH BIOPSY;  Surgeon: Jorge Freeman MD;  Location: [facility] OR             REMOVAL OF OVARIAN CYST(S)       HYSTERECTOMY, YANG      Metorrhagia     IMPLANT PACEMAKER  2016     Left Subclavian Line, Triple Lumen      Gastric Ulcer, Dieulfoy lesion, hemostatic clips placed - [facility] GI Bleed [facility]     RELEASE CARPAL TUNNEL      RIGHT - Carpal Tunnel Syndrome     REPAIR BLADDER       SIGMOIDOSCOPY FLEXIBLE N/A 2019    Procedure: FLEXIBLE SIGMOIDOSCOPY;  Surgeon: Jorge Freeman MD;  Location: [facility] OR       Prior to Admission Medications   Prior to Admission Medications   Prescriptions Last Dose Informant Patient Reported?  Taking?   LORazepam (ATIVAN) 0.5 MG tablet Unknown at Unknown time  No No   Sig: TAKE 1 TABLET BY MOUTH EVERY 8 HOURS AS NEEDED FOR ANXIETY   Multiple Vitamins-Minerals (MULTIVITAMIN ADULT PO) 11/12/2020 at 0730  Yes Yes   Sig: Take 1 tablet by mouth daily (Patient uses Heart Healthy Multivitamin, Dr. Lucy lakhani)   acetaminophen 500 MG PO tablet Unknown at Unknown time  Yes No   Sig: Take 1,000 mg by mouth as needed   furosemide (LASIX) 20 MG tablet 11/12/2020 at 0730  No Yes   Sig: Take 1 tablet (20 mg) by mouth daily   isosorbide mononitrate (IMDUR) 30 MG 24 hr tablet 11/11/2020 at 2130  No Yes   Sig: TAKE ONE TABLET BY MOUTH AT BEDTIME.   nitroGLYcerin (NITROSTAT) 0.4 MG sublingual tablet Unknown at Unknown time Self No No   Sig: Place 1 tablet (0.4mg) by sublingual route at the first sign of attack; may repeat ever 5 min until relief; if pain persists after 3 tablets in 15 minutes prompt medical attention is recommended. AS NEEDED   pantoprazole (PROTONIX) 40 MG EC tablet 11/12/2020 at 0730  No Yes   Sig: TAKE 1 TABLET BY MOUTH ONCE DAILY.      Facility-Administered Medications: None     Allergies   Allergies   Allergen Reactions     [place] Juice Other (See Comments), Nausea and GI Disturbance     Vertigo     Food      Oranges.     Naprosyn [Naproxen]      Incontinence       Niacin Swelling       Social History   I have reviewed this patient's social history and updated it with pertinent information if needed. Lauren Barron  reports that she has never smoked. She has never used smokeless tobacco. She reports that she does not drink alcohol or use drugs.    Family History   I have reviewed this patient's family history and updated it with pertinent information if needed.   Family History   Problem Relation Age of Onset     Cancer Brother         Pancreatic     Cancer Brother         Prostate     Cancer Sister         Breast     Hypertension Brother      Hypertension Father      Hypertension Sister       Hypertension Mother      Aneurysm Daughter 68        cerebral, sudden death     Osteoporosis Other      Thyroid Disease No family hx of      Diabetes No family hx of        Review of Systems   All 14 Systems were reviewed and found to be negative except what's mentioned in HPI.    Physical Exam       BP: (!) 187/69 Pulse: 60            Vital Signs with Ranges  Temp:  [96.3  F (35.7  C)] 96.3  F (35.7  C)  Pulse:  [60] 60  BP: (132-187)/(69-76) 187/69  SpO2:  [98 %] 98 %  0 lbs 0 oz    Physical exam:   Constitutional: elderly women, skinny looking in no distress at all.   HEENT: atraumatic, normocephalic. MMM.  Neck: supple, no masses, no bruits, no LAD.  CVS: regular, S1, S2, no S3, 1-2 ejection mumur, no rubs or gallops. No edema.  RESPIRATORY: symmetrical respirations, no accessory muscle use, prlonged expiration and scattered rhonchi - minimal abnormal findings.   GI: soft, not tender, not distended, no HSM, no pulsatile masses. BS present.   : no CVA tenderness. Bladder not palpable.  MS: normal muscle bulk, no fasciculations, no sarcopenia.  NEUROLOGICAL: follows commands, no focal motor exam. DTR 1/4. No asterixis, no flapping tremors.  PSYCHIATRIC: awake, oriented x 3. She knows her age, address, month and year. Doesn't know day of the mo. Stated that the president is Rodriguez, but corrected herself quickly - Trump.   SKIN: warm, well perfused. No rash.   HEMATOLOGIC/LYMPHATIC: no petechia, no ecchymoses, no lymphadenopathy.     Goals of care were discussed with the patient (no family at bedside) which included but not limited to anticipated treatment course during the current hospitalization, recovery from current event, discharge planning and transitions of care responsibilities and expected outcomes. Code status was addressed on admission as well. End of life care discussion not done.    Data   Data reviewed today:  I personally reviewed: blood work, chest XR, and urinary test results.     See below  radiology report.   Recent Labs   Lab 11/12/20  1620 11/10/20  1225   * 125*   POTASSIUM 4.0 4.2   CHLORIDE 87* 94   CO2 24 24   BUN 25 28   CR 0.87 1.13*   ANIONGAP 9 7   PEARL 8.5 9.0   * 89       Recent Results (from the past 24 hour(s))   XR CHEST 2 VW (Clinic Performed)    Narrative    PROCEDURE:  XR CHEST 2 VW    HISTORY:  Hyponatremia.     COMPARISON:  March 2020    FINDINGS:   The cardiac silhouette is normal in size. The pulmonary vasculature is  normal.  There is significant volume loss seen in the right middle  lobe.  No pleural effusion or pneumothorax. There is a moderate size  hiatal hernia. A transvenous pacemakers in place.      Impression    IMPRESSION:  Right middle lobe volume loss. Hiatal hernia.      GALLITO HENRY MD

## 2020-11-13 NOTE — PLAN OF CARE
"Reason for hospital stay:  Hyponatremia   Most recent vitals: /67 (BP Location: Right arm)   Pulse 60   Temp 97.5  F (36.4  C) (Tympanic)   Resp 16   Ht 1.6 m (5' 3\")   Wt 54.3 kg (119 lb 11.4 oz)   SpO2 99%   BMI 21.21 kg/m      Pt A&O, able to make needs known. Denied having any pain. Stand by assist to bathroom. NS at 50 mL/hr. Lungs clear throughout, maintaining on RA. Bowels active and audible. Regular diet. Bed alarms on, at times gets up out of bed to go to bathroom without calling staff, encouraged use of call light. Urinary frequency and urgency. COVID swab done and sent to lab. Will continue to monitor.     "

## 2020-11-13 NOTE — TELEPHONE ENCOUNTER
10:44 AM    Reason for Call: OVERBOOK    Patient is having the following symptoms: Hospital follow up for discharged on 11/13/20 days. NEEDS F/U APPT BY NEXT Monday-Tuesday OF next week (Nov 26)    The patient is requesting an appointment for Hosp f/u with Dr. Vandana Jones.    Was an appointment offered for this call? No, Provider is booked  If yes : Appointment type              Date    Preferred method for responding to this message: Telephone Call  What is your phone number ?[phone]    If we cannot reach you directly, may we leave a detailed response at the number you provided? No    Can this message wait until your PCP/provider returns, if unavailable today? Please call pt with overbook appt.     Abby Talley

## 2020-11-13 NOTE — PLAN OF CARE
[facility] Inpatient Admission Note:    Patient admitted to 3106/3106-1 at approximately 1730 via w/c accompanied by transport tech from clinic . Report received from charge nurse. Patient ambulated to bed via self.. Patient is alert and oriented X 3, denies pain; rates at 0 on 0-10 scale.  Patient oriented to room, unit, hourly rounding, and plan of care. Explained admission packet and patient handbook with patient bill of rights brochure. Will continue to monitor and document as needed.     Inpatient Nursing criteria listed below was met:    Health care directives status obtained and documented: Yes    Care Everywhere authorization obtained No    MRSA swab completed for patient 65 years and older: N/A    Patient identifies a surrogate decision maker: Yes If yes, who:julia Contact Information:see facesheet     If initial lactic acid >2.0, repeat lactic acid drawn within one hour of arrival to unit: NA. If no, state reason: na    Vaccination assessment and education completed: Yes   Vaccinations received prior to admission: Pneumovax yes  Influenza(seasonal)  YES   Vaccination(s) ordered: patient declines    Clergy visit ordered if patient requests: N/A    Skin issues/needs documented: N/A    Isolation Patient: no Education given, correct sign in place and documentation row added to PCS:  No    Fall Prevention N/A: Care plan updated, education given and documented, sticker and magnet in place: N/A    Care Plan initiated: Yes    Education Documented (including assessment): Yes    Patient has discharge needs : No If yes, please explain:na

## 2020-11-13 NOTE — PROGRESS NOTES
Medical record and Jhonny Score reviewed. Participated in interdisciplinary rounds.  No apparent needs noted at this time. Care Transitions will remain available if needs arise.

## 2020-11-13 NOTE — PLAN OF CARE
Pt alert and orientated. Ate well for supper. Resting in bed.  Bed alarm on.  SCD on.  Saline lock intact.  Had x1 250 ml bolus.  Face to face report given with opportunity to observe patient.    Report given to  ralf Chavez RN   11/12/2020  11:45 PM

## 2020-11-16 NOTE — TELEPHONE ENCOUNTER
Called and spoke with Yesica about changing appt. They were fine with the new date and time. Will see on Thursday at 11:00.

## 2020-11-16 NOTE — PROGRESS NOTES
Subjective     Lauren Barron is a 89 year old female who presents to clinic today for the following health issues:    [facility]           Hospital Follow-up Visit:    Hospital/Nursing Home/IP Rehab Facility: [facility]  Date of Admission: 11/12/20  Date of Discharge: 11/13/20  Reason(s) for Admission: Hyponatremia, ASCVD      Was your hospitalization related to COVID-19? No   Problems taking medications regularly:  None  Medication changes since discharge: None  Problems adhering to non-medication therapy:  None    Summary of hospitalization:  [facility] discharge summary reviewed  Diagnostic Tests/Treatments reviewed.  Follow up needed: sodium   Other Healthcare Providers Involved in Patient s Care:         None  Update since discharge: improved.       Post Discharge Medication Reconciliation: discharge medications reconciled, continue medications without change.  Plan of care communicated with patient and family   [facility]     Discharge Summary  Hospitalist     Date of Admission:  11/12/2020  Date of Discharge:  11/13/2020  Discharging Provider: Nupur Epperson MD  Date of Service (when I saw the patient): 11/13/20        Discharge Diagnoses     Principal Problem:    Hyponatremia (10/14/2019)  Active Problems:    ASCVD (arteriosclerotic cardiovascular disease) (8/25/2009)    Diastolic dysfunction (3/12/2012)    Presence of cardiac pacemaker (3/28/2016)    Metabolic encephalopathy (11/12/2020)    Chronic anemia (11/12/2020)              History of Present Illness     Lauren Barron is an 89 year old female who presented with hyponatremia.  Please see admission H+P for additional details.           Hospital Course     Lauren Barron was admitted on 11/12/2020. 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  She was said to be more confused, but patient was oriented x 3 and was conversant and interactive.  She states that she has had problems  "with memory lately.  She is also on chronic lasix.  She was euvolemic.  She was placed on NS at 50 CC/hr, and her Na improved to 128.  She feels at her baseline now.  We will decrease her home lasix from 20 mg to 10 mg daily, and have her f/u with her PCP in 2-3 days to recheck her Na level.  She is clinically stable to discharge.     Nupur Epperson MD                  Review of Systems   Constitutional, HEENT, cardiovascular, pulmonary, gi and gu systems are negative, except as otherwise noted.      Objective    BP (!) 160/60 (BP Location: Left arm, Patient Position: Sitting, Cuff Size: Adult Regular)   Pulse 65   Temp 96.2  F (35.7  C) (Tympanic)   Resp 17   Ht 1.6 m (5' 3\")   Wt 54.3 kg (119 lb 11 oz)   SpO2 98%   BMI 21.20 kg/m    Body mass index is 21.2 kg/m .  Physical Exam   GENERAL: healthy, alert and no distress  NECK: no adenopathy, no asymmetry, masses, or scars and thyroid normal to palpation  RESP: lungs clear to auscultation - no rales, rhonchi or wheezes  CV: regular rate and rhythm, normal S1 S2, no S3 or S4, no murmur, click or rub, no peripheral edema and peripheral pulses strong  MS: no gross musculoskeletal defects noted, no edema  NEURO: Normal strength and tone, mentation intact and speech normal  PSYCH: mentation appears normal, affect normal/bright            Assessment & Plan     Hyponatremia  Recheck lab today  Continue on 10 mg of Lasix, renal function improved with lowering this dose   - Basic metabolic panel    Essential hypertension  Not controlled  Will restart low dose lopressor  - metoprolol tartrate (LOPRESSOR) 25 MG tablet; Take 1 tablet (25 mg) by mouth 2 times daily            Return in about 4 weeks (around 12/17/2020) for BP Recheck, new lopressor start, in person.    Vandana Jones MD  [facility] [place]    "

## 2020-11-16 NOTE — TELEPHONE ENCOUNTER
Called and left message for daughter Yesica about appt tomorrow at 10:00 per Dr Garcia, arrival time 9:45.

## 2020-11-16 NOTE — TELEPHONE ENCOUNTER
Patient's daughter Yesica calling back regarding appointment tomorrow. States patient is not able to come at this time due to no transportation. Yesica is unable to take her and her other daughter is working until 1 PM on Tuesday and Wednesday. She would like if she can come in on Thursday any time. Offered another provider, but only wants to see PCP. Advised to go to UC/ER if symptoms worsen before being seen. Daughter verbalized understanding.  Please advise, thank you.    Yesica's phone number is [phone] and it is ok to leave message if no answer.

## 2020-11-19 NOTE — NURSING NOTE
"Chief Complaint   Patient presents with     Hospital F/U       Initial BP (!) 160/60 (BP Location: Left arm, Patient Position: Sitting, Cuff Size: Adult Regular)   Pulse 65   Temp 96.2  F (35.7  C) (Tympanic)   Resp 17   Ht 1.6 m (5' 3\")   Wt 54.3 kg (119 lb 11 oz)   SpO2 98%   BMI 21.20 kg/m   Estimated body mass index is 21.2 kg/m  as calculated from the following:    Height as of this encounter: 1.6 m (5' 3\").    Weight as of this encounter: 54.3 kg (119 lb 11 oz).  Medication Reconciliation: complete  Jackie Mcadams LPN  "

## 2020-11-25 NOTE — TELEPHONE ENCOUNTER
"Patient's daughter called stating patient was recently treated for a UTI. Patient has started to experience burning with urination today again. PCP out of the office. Patient scheduled to see covering provider today. Nurse advised daughter to have patient seen in UC/ED if symptoms worsen. Daughter verbalized understanding.     Additional Information    Negative: Shock suspected (e.g., cold/pale/clammy skin, too weak to stand, low BP, rapid pulse)    Negative: Sounds like a life-threatening emergency to the triager    Negative: Unable to urinate (or only a few drops) and bladder feels very full    Negative: Vomiting    Negative: Patient sounds very sick or weak to the triager    Negative: SEVERE pain with urination    Negative: Fever > 100.5 F (38.1 C)    Negative: Side (flank) or lower back pain present    Negative: Taking antibiotic > 24 hours for UTI and fever persists    Negative: Taking antibiotic > 3 days for UTI and painful urination not improved    Negative: Unusual vaginal discharge    Negative: > 2 UTIs in last year    Negative: Patient is worried about sexually transmitted disease (STD)    Age > 50 years    Answer Assessment - Initial Assessment Questions  1. SEVERITY: \"How bad is the pain?\"  (e.g., Scale 1-10; mild, moderate, or severe)    - MILD (1-3): complains slightly about urination hurting    - MODERATE (4-7): interferes with normal activities      - SEVERE (8-10): excruciating, unwilling or unable to urinate because of the pain       moderate  2. FREQUENCY: \"How many times have you had painful urination today?\"       Once today  3. PATTERN: \"Is pain present every time you urinate or just sometimes?\"       yes  4. ONSET: \"When did the painful urination start?\"       today  5. FEVER: \"Do you have a fever?\" If so, ask: \"What is your temperature, how was it measured, and when did it start?\"      no  6. PAST UTI: \"Have you had a urine infection before?\" If so, ask: \"When was the last time?\" and \"What " "happened that time?\"       A couple weeks ago. Daughter unsure of what antibiotic.  7. CAUSE: \"What do you think is causing the painful urination?\"  (e.g., UTI, scratch, Herpes sore)      Possible uti  8. OTHER SYMPTOMS: \"Do you have any other symptoms?\" (e.g., flank pain, vaginal discharge, genital sores, urgency, blood in urine)      no  9. PREGNANCY: \"Is there any chance you are pregnant?\" \"When was your last menstrual period?\"      n/a    Protocols used: URINATION PAIN - FEMALE-A-[place]      "

## 2020-11-25 NOTE — NURSING NOTE
"Chief Complaint   Patient presents with     Dysuria       Initial /62 (BP Location: Left arm, Patient Position: Chair, Cuff Size: Adult Regular)   Pulse 60   Temp 96  F (35.6  C) (Tympanic)   Resp 20   Wt 56.7 kg (125 lb)   SpO2 99%   BMI 22.14 kg/m   Estimated body mass index is 22.14 kg/m  as calculated from the following:    Height as of 11/19/20: 1.6 m (5' 3\").    Weight as of this encounter: 56.7 kg (125 lb).  Medication Reconciliation: complete  Alejandra Kovacs LPN    "

## 2020-11-25 NOTE — Clinical Note
Repeating UA again today.  May need OT referral for cognitive assessment -just reminder for follow up scheduled next month.  Thx.

## 2020-11-25 NOTE — PROGRESS NOTES
Mikaela Barron is a 89 year old female who presents to clinic today for the following health issues:    HPI         Genitourinary - Female  Onset/Duration: today  Description:   Painful urination (Dysuria): YES           Frequency: no  Blood in urine (Hematuria): no  Delay in urine (Hesitency): no  Intensity: mild  Progression of Symptoms:  same  Accompanying Signs & Symptoms:  Fever/chills: no  Flank pain: no  Nausea and vomiting: no  Vaginal symptoms: none  Abdominal/Pelvic Pain: no  History:   History of frequent UTI s: was treated on 11/5/20 with Bactrim for culture positive E Coli; repeat UA 11/12/20 was negative for infection  History of kidney stones: no  Sexually Active: no  Possibility of pregnancy: No  Precipitating or alleviating factors: None  Therapies tried and outcome:  Bactrim on 11/5/20      Prior to 11/2020 - last antibiotic 3/2020.  Mentioned this morning to daughter.  Stomach felt full.  Lasix dose recently reduced.  Lives alone at [place].    Since last UTI - more confused at times.  Doesn't recall when people call or stop by.  Eating well.  Does puzzles, word finds.  Takes medications as directed.  Prior low sodium level - hospital.  Recheck 11/19/2020 - level stable.  BP Lopressor added at that time.  Follow up 12/17/20.    No fever, vomiting.  Normal bowel movements.  No swelling/edema.      Review of Systems   Constitutional, HEENT, cardiovascular, pulmonary, gi and gu systems are negative, except as otherwise noted.      Objective    /62 (BP Location: Left arm, Patient Position: Chair, Cuff Size: Adult Regular)   Pulse 60   Temp 96  F (35.6  C) (Tympanic)   Resp 20   Wt 56.7 kg (125 lb)   SpO2 99%   BMI 22.14 kg/m    Body mass index is 22.14 kg/m .  Physical Exam   GENERAL: healthy, alert and no distress  EYES: Eyes grossly normal to inspection, PERRL and conjunctivae and sclerae normal  NECK: no adenopathy, no asymmetry, masses, or scars and thyroid normal  to palpation  RESP: lungs clear to auscultation - no rales, rhonchi or wheezes  CV: regular rate and rhythm, normal S1 S2, no S3 or S4, no murmur, click or rub, no peripheral edema and peripheral pulses strong  ABDOMEN: soft, nontender, no hepatosplenomegaly, no masses and bowel sounds normal  MS: no gross musculoskeletal defects noted, no edema  PSYCH: mentation appears normal, affect normal/bright    Voided sample was inadequate to run UA/UC.        Assessment & Plan     Dysuria  Will repeat UA today - first attempt inadequate volume.  Will take kit home.  - UA reflex to Microscopic and Culture - [place]    Hyponatremia  Stable at last visit -chronic.    Confusion  Noted over past month or so - with UTI.  May benefit from cognitive assessment at OT.   Follow up 12/2020 scheduled already with PCP - suggest follow up then - will CC PCP.          Patient Instructions   Return urine sample when able.            Linn Conde MD  [facility] - [place]

## 2020-12-02 NOTE — TELEPHONE ENCOUNTER
Ativan  Last Written Prescription Date: 10/25/20  Last Fill Quantity: 30 # of Refills: 0  Last Office Visit: 11/25/20

## 2020-12-07 NOTE — TELEPHONE ENCOUNTER
Notified daughter Yesica of information below. Verbalized understanding and appointment made for today at 12:15.

## 2020-12-07 NOTE — TELEPHONE ENCOUNTER
"Patient's daughter, Yesica reports patient is \"getting up multiple times throughout the night to use the bathroom\". States that her lasix was decreased to 10 mg from 20 mg. States patient takes this in the morning and is unsure if this is what is causing this. States patient is \"very confused since UTI\" and that family is staying with her since she left the hospital. Denies swelling in ankles, legs, hands, or face. Denies any difficulty breathing or any other symptoms. Does not check weight daily. Patient has an appointment next week, but Yesica would like to know if there is anything she can do to help.  Please advise, thank you.    Yesica's phone number is [phone]    Reason for Disposition    Has to get out of bed to urinate > 2 times a night (i.e., nocturia)    Additional Information    Negative: Shock suspected (e.g., cold/pale/clammy skin, too weak to stand, low BP, rapid pulse)    Negative: Sounds like a life-threatening emergency to the triager    Negative: Followed a genital area injury    Negative: Followed a genital area injury (penis, scrotum)    Negative: Vaginal discharge    Negative: Pus (white, yellow) or bloody discharge from end of penis    Negative: [1] Taking antibiotic for urinary tract infection (UTI) AND [2] female    Negative: [1] Taking antibiotic for urinary tract infection (UTI) AND [2] male    Negative: [1] Discomfort (pain, burning or stinging) when passing urine AND [2] pregnant    Negative: [1] Discomfort (pain, burning or stinging) when passing urine AND [2] postpartum (from 0 to 6 weeks after delivery)    Negative: [1] Discomfort (pain, burning or stinging) when passing urine AND [2] female    Negative: [1] Discomfort (pain, burning or stinging) when passing urine AND [2] male    Negative: Pain or itching in the vulvar area    Negative: Pain in scrotum is main symptom    Negative: Blood in the urine is main symptom    Negative: Symptoms arising from use of a urinary catheter (Squires or " "Coude)    Negative: [1] Unable to urinate (or only a few drops) > 4 hours AND     [2] bladder feels very full (e.g., palpable bladder or strong urge to urinate)    Negative: [1] Decreased urination and [2] drinking very little AND [2] dehydration suspected (e.g., dark urine, no urine > 12 hours, very dry mouth, very lightheaded)    Negative: Patient sounds very sick or weak to the triager    Negative: Fever > 100.5 F (38.1 C)    Negative: Side (flank) or lower back pain present    Negative: [1] Can't control passage of urine (i.e., urinary incontinence) AND [2] new onset (< 2 weeks) or worsening    Negative: Urinating more frequently than usual (i.e., frequency)    Negative: Bad or foul-smelling urine    Negative: [1] Can't control passage of urine (i.e., urinary incontinence, wetting self) AND [2] present > 2 weeks    Negative: Urination is difficult to start (i.e., hesitancy) or straining    Negative: Dribbling (losing urine) just after finishing urination (i.e., post-void dribbling)    Answer Assessment - Initial Assessment Questions  1. SYMPTOM: \"What's the main symptom you're concerned about?\" (e.g., frequency, incontinence)      nocturia  2. ONSET: \"When did the  nocturia  start?\"      Unsure. Daughter stayed with her this weekend and stated she is \"getting up quite a few times throughout the night to use the bathroom\".  3. PAIN: \"Is there any pain?\" If so, ask: \"How bad is it?\" (Scale: 1-10; mild, moderate, severe)      no  4. CAUSE: \"What do you think is causing the symptoms?\"      unsure  5. OTHER SYMPTOMS: \"Do you have any other symptoms?\" (e.g., fever, flank pain, blood in urine, pain with urination)      no  6. PREGNANCY: \"Is there any chance you are pregnant?\" \"When was your last menstrual period?\"      No    Protocols used: URINARY SYMPTOMS-A-AH      "

## 2020-12-13 NOTE — TELEPHONE ENCOUNTER
Patient's daughter calls with questions regarding covid-19 exposure and testing. Patient's care giver tested positive for covid today, she was last with patient on Friday. Patient currently has no symptoms.    Unable to triage since patient is not with daughter.  Daughter will contact patient's primary clinic tomorrow regarding testing.    Margarita Tolliver RN  [facility]    Additional Information    Negative: [1] Caller is not with the adult (patient) AND [2] reporting urgent symptoms    Negative: Lab result questions    Negative: Medication questions    Negative: Caller can't be reached by phone    Negative: Caller has already spoken to PCP or another triager    Negative: RN needs further essential information from caller in order to complete triage    Negative: Requesting regular office appointment    Negative: [1] Caller requesting NON-URGENT health information AND [2] PCP's office is the best resource    [1] Caller is not with the adult (patient) AND [2] probable NON-URGENT symptoms    Negative: Health Information question, no triage required and triager able to answer question    Negative: General information question, no triage required and triager able to answer question    Negative: Question about upcoming scheduled test, no triage required and triager able to answer question    Protocols used: INFORMATION ONLY CALL-A-

## 2021-01-01 ENCOUNTER — TELEPHONE (OUTPATIENT)
Dept: CASE MANAGEMENT | Facility: HOSPITAL | Age: 86
End: 2021-01-01

## 2021-01-01 ENCOUNTER — HOSPITAL ENCOUNTER (EMERGENCY)
Facility: HOSPITAL | Age: 86
Discharge: HOME | End: 2021-01-19
Attending: PHYSICIAN ASSISTANT | Admitting: PHYSICIAN ASSISTANT
Payer: COMMERCIAL

## 2021-01-01 ENCOUNTER — APPOINTMENT (OUTPATIENT)
Dept: GENERAL RADIOLOGY | Facility: HOSPITAL | Age: 86
DRG: 291 | End: 2021-01-01
Attending: EMERGENCY MEDICINE
Payer: COMMERCIAL

## 2021-01-01 ENCOUNTER — HOSPITAL ENCOUNTER (INPATIENT)
Facility: HOSPITAL | Age: 86
LOS: 2 days | Discharge: HOME | DRG: 291 | End: 2021-02-22
Attending: EMERGENCY MEDICINE | Admitting: INTERNAL MEDICINE
Payer: COMMERCIAL

## 2021-01-01 ENCOUNTER — APPOINTMENT (OUTPATIENT)
Dept: GENERAL RADIOLOGY | Facility: HOSPITAL | Age: 86
DRG: 291 | End: 2021-01-01
Attending: NURSE PRACTITIONER
Payer: COMMERCIAL

## 2021-01-01 ENCOUNTER — CARE COORDINATION (OUTPATIENT)
Dept: CASE MANAGEMENT | Facility: HOSPITAL | Age: 86
End: 2021-01-01

## 2021-01-01 ENCOUNTER — LAB REQUISITION (OUTPATIENT)
Dept: LAB | Facility: HOSPITAL | Age: 86
End: 2021-01-01
Payer: COMMERCIAL

## 2021-01-01 ENCOUNTER — HOSPITAL ENCOUNTER (INPATIENT)
Dept: CARDIOLOGY | Facility: HOSPITAL | Age: 86
DRG: 291 | End: 2021-02-22
Attending: NURSE PRACTITIONER
Payer: COMMERCIAL

## 2021-01-01 ENCOUNTER — TELEPHONE (OUTPATIENT)
Dept: FAMILY MEDICINE | Facility: OTHER | Age: 86
End: 2021-01-01

## 2021-01-01 ENCOUNTER — APPOINTMENT (OUTPATIENT)
Dept: CT IMAGING | Facility: HOSPITAL | Age: 86
DRG: 291 | End: 2021-01-01
Attending: EMERGENCY MEDICINE
Payer: COMMERCIAL

## 2021-01-01 ENCOUNTER — APPOINTMENT (OUTPATIENT)
Dept: GENERAL RADIOLOGY | Facility: HOSPITAL | Age: 86
End: 2021-01-01
Attending: PHYSICIAN ASSISTANT
Payer: COMMERCIAL

## 2021-01-01 ENCOUNTER — MEDICAL CORRESPONDENCE (OUTPATIENT)
Dept: HEALTH INFORMATION MANAGEMENT | Facility: CLINIC | Age: 86
End: 2021-01-01

## 2021-01-01 VITALS
RESPIRATION RATE: 16 BRPM | SYSTOLIC BLOOD PRESSURE: 186 MMHG | HEART RATE: 60 BPM | OXYGEN SATURATION: 94 % | TEMPERATURE: 98.2 F | DIASTOLIC BLOOD PRESSURE: 82 MMHG

## 2021-01-01 VITALS
BODY MASS INDEX: 23.46 KG/M2 | HEIGHT: 60 IN | WEIGHT: 119.49 LBS | SYSTOLIC BLOOD PRESSURE: 158 MMHG | DIASTOLIC BLOOD PRESSURE: 68 MMHG | HEART RATE: 71 BPM | TEMPERATURE: 97.1 F | OXYGEN SATURATION: 93 % | RESPIRATION RATE: 18 BRPM

## 2021-01-01 DIAGNOSIS — K26.9 DUODENAL ULCER: ICD-10-CM

## 2021-01-01 DIAGNOSIS — I51.89 DIASTOLIC DYSFUNCTION: Primary | ICD-10-CM

## 2021-01-01 DIAGNOSIS — R09.02 HYPOXIA: ICD-10-CM

## 2021-01-01 DIAGNOSIS — J18.9 PNEUMONIA OF BOTH LOWER LOBES DUE TO INFECTIOUS ORGANISM: ICD-10-CM

## 2021-01-01 DIAGNOSIS — R06.02 SHORTNESS OF BREATH: ICD-10-CM

## 2021-01-01 DIAGNOSIS — I25.9 CHRONIC ISCHEMIC HEART DISEASE, UNSPECIFIED: ICD-10-CM

## 2021-01-01 DIAGNOSIS — J90 PLEURAL EFFUSION: ICD-10-CM

## 2021-01-01 DIAGNOSIS — I25.9 CHRONIC ISCHEMIC HEART DISEASE: ICD-10-CM

## 2021-01-01 LAB
ALBUMIN SERPL-MCNC: 3.1 G/DL (ref 3.4–5)
ALBUMIN SERPL-MCNC: 3.4 G/DL (ref 3.4–5)
ALBUMIN UR-MCNC: 100 MG/DL
ALP SERPL-CCNC: 132 U/L (ref 40–150)
ALP SERPL-CCNC: 158 U/L (ref 40–150)
ALT SERPL W P-5'-P-CCNC: 107 U/L (ref 0–50)
ALT SERPL W P-5'-P-CCNC: 115 U/L (ref 0–50)
ANION GAP SERPL CALCULATED.3IONS-SCNC: 5 MMOL/L (ref 3–14)
ANION GAP SERPL CALCULATED.3IONS-SCNC: 9 MMOL/L (ref 3–14)
APPEARANCE FLD: NORMAL
APPEARANCE UR: CLEAR
AST SERPL W P-5'-P-CCNC: 168 U/L (ref 0–45)
AST SERPL W P-5'-P-CCNC: 58 U/L (ref 0–45)
BACTERIA #/AREA URNS HPF: ABNORMAL /HPF
BACTERIA SPEC CULT: NO GROWTH
BASE DEFICIT BLDV-SCNC: 0.1 MMOL/L
BASOPHILS # BLD AUTO: 0 10E9/L (ref 0–0.2)
BASOPHILS NFR BLD AUTO: 0.3 %
BASOPHILS NFR BLD AUTO: 0.5 %
BASOPHILS NFR BLD AUTO: 0.5 %
BILIRUB SERPL-MCNC: 0.5 MG/DL (ref 0.2–1.3)
BILIRUB SERPL-MCNC: 0.6 MG/DL (ref 0.2–1.3)
BILIRUB UR QL STRIP: NEGATIVE
BUN SERPL-MCNC: 18 MG/DL (ref 7–30)
BUN SERPL-MCNC: 24 MG/DL (ref 7–30)
BUN SERPL-MCNC: 31 MG/DL (ref 7–30)
BUN SERPL-MCNC: 31 MG/DL (ref 7–30)
BUN SERPL-MCNC: 34 MG/DL (ref 7–30)
CALCIUM SERPL-MCNC: 8 MG/DL (ref 8.5–10.1)
CALCIUM SERPL-MCNC: 8.1 MG/DL (ref 8.5–10.1)
CALCIUM SERPL-MCNC: 8.6 MG/DL (ref 8.5–10.1)
CALCIUM SERPL-MCNC: 8.8 MG/DL (ref 8.5–10.1)
CALCIUM SERPL-MCNC: 9 MG/DL (ref 8.5–10.1)
CHLORIDE BLD-SCNC: 101 MMOL/L (ref 94–109)
CHLORIDE SERPL-SCNC: 102 MMOL/L (ref 94–109)
CHLORIDE SERPL-SCNC: 104 MMOL/L (ref 94–109)
CHLORIDE SERPL-SCNC: 106 MMOL/L (ref 94–109)
CHLORIDE SERPL-SCNC: 89 MMOL/L (ref 94–109)
CO2 SERPL-SCNC: 23 MMOL/L (ref 20–32)
CO2 SERPL-SCNC: 25 MMOL/L (ref 20–32)
CO2 SERPL-SCNC: 25 MMOL/L (ref 20–32)
CO2 SERPL-SCNC: 26 MMOL/L (ref 20–32)
CO2 SERPL-SCNC: 27 MMOL/L (ref 20–32)
COLOR FLD: YELLOW
COLOR UR AUTO: YELLOW
COPATH REPORT: NORMAL
CREAT SERPL-MCNC: 0.7 MG/DL (ref 0.52–1.04)
CREAT SERPL-MCNC: 0.79 MG/DL (ref 0.52–1.04)
CREAT SERPL-MCNC: 0.86 MG/DL (ref 0.52–1.04)
CREAT SERPL-MCNC: 1.01 MG/DL (ref 0.52–1.04)
CREAT SERPL-MCNC: 1.07 MG/DL (ref 0.52–1.04)
CRP SERPL-MCNC: 6.3 MG/L (ref 0–8)
DIFFERENTIAL METHOD BLD: ABNORMAL
EOSINOPHIL # BLD AUTO: 0 10E9/L (ref 0–0.7)
EOSINOPHIL # BLD AUTO: 0 10E9/L (ref 0–0.7)
EOSINOPHIL # BLD AUTO: 0.1 10E9/L (ref 0–0.7)
EOSINOPHIL NFR BLD AUTO: 0.5 %
EOSINOPHIL NFR BLD AUTO: 0.5 %
EOSINOPHIL NFR BLD AUTO: 2 %
ERYTHROCYTE [DISTWIDTH] IN BLOOD BY AUTOMATED COUNT: 14.5 % (ref 10–15)
ERYTHROCYTE [DISTWIDTH] IN BLOOD BY AUTOMATED COUNT: 15.7 % (ref 10–15)
ERYTHROCYTE [DISTWIDTH] IN BLOOD BY AUTOMATED COUNT: 15.7 % (ref 10–15)
ERYTHROCYTE [DISTWIDTH] IN BLOOD BY AUTOMATED COUNT: 15.9 % (ref 10–15)
FLUAV RNA RESP QL NAA+PROBE: NEGATIVE
FLUBV RNA RESP QL NAA+PROBE: NEGATIVE
GFR SERPL CREATININE-BSD FRML MDRD: 44 ML/MIN/1.73M2
GFR SERPL CREATININE-BSD FRML MDRD: 47 ML/MIN/{1.73_M2}
GFR SERPL CREATININE-BSD FRML MDRD: 57 ML/MIN/{1.73_M2}
GFR SERPL CREATININE-BSD FRML MDRD: 63 ML/MIN/{1.73_M2}
GFR SERPL CREATININE-BSD FRML MDRD: 74 ML/MIN/{1.73_M2}
GLUCOSE BLD-MCNC: 99 MG/DL (ref 70–99)
GLUCOSE FLD-MCNC: 122 MG/DL
GLUCOSE SERPL-MCNC: 101 MG/DL (ref 70–99)
GLUCOSE SERPL-MCNC: 116 MG/DL (ref 70–99)
GLUCOSE SERPL-MCNC: 81 MG/DL (ref 70–99)
GLUCOSE SERPL-MCNC: 84 MG/DL (ref 70–99)
GLUCOSE UR STRIP-MCNC: NEGATIVE MG/DL
GRAM STN SPEC: NORMAL
HCO3 BLDV-SCNC: 23 MMOL/L (ref 21–28)
HCT VFR BLD AUTO: 33.5 % (ref 35–47)
HCT VFR BLD AUTO: 34.6 % (ref 35–47)
HCT VFR BLD AUTO: 36.6 % (ref 35–47)
HCT VFR BLD AUTO: 39.1 % (ref 35–47)
HGB BLD-MCNC: 10.8 G/DL (ref 11.7–15.7)
HGB BLD-MCNC: 11.9 G/DL (ref 11.7–15.7)
HGB BLD-MCNC: 12 G/DL (ref 11.7–15.7)
HGB BLD-MCNC: 12.8 G/DL (ref 11.7–15.7)
HGB UR QL STRIP: ABNORMAL
IMM GRANULOCYTES # BLD: 0 10E9/L (ref 0–0.4)
IMM GRANULOCYTES NFR BLD: 0.2 %
IMM GRANULOCYTES NFR BLD: 0.3 %
IMM GRANULOCYTES NFR BLD: 0.5 %
KETONES UR STRIP-MCNC: NEGATIVE MG/DL
LABORATORY COMMENT REPORT: ABNORMAL
LACTATE BLD-SCNC: 1.7 MMOL/L (ref 0.7–2)
LDH FLD L TO P-CCNC: 69 U/L
LDH SERPL L TO P-CCNC: 285 U/L (ref 81–234)
LEUKOCYTE ESTERASE UR QL STRIP: NEGATIVE
LYMPHOCYTES # BLD AUTO: 1 10E9/L (ref 0.8–5.3)
LYMPHOCYTES # BLD AUTO: 1.7 10E9/L (ref 0.8–5.3)
LYMPHOCYTES # BLD AUTO: 1.9 10E9/L (ref 0.8–5.3)
LYMPHOCYTES NFR BLD AUTO: 13.7 %
LYMPHOCYTES NFR BLD AUTO: 29 %
LYMPHOCYTES NFR BLD AUTO: 32.3 %
MCH RBC QN AUTO: 25.7 PG (ref 26.5–33)
MCH RBC QN AUTO: 26 PG (ref 26.5–33)
MCH RBC QN AUTO: 26.2 PG (ref 26.5–33)
MCH RBC QN AUTO: 26.8 PG (ref 26.5–33)
MCHC RBC AUTO-ENTMCNC: 32.2 G/DL (ref 31.5–36.5)
MCHC RBC AUTO-ENTMCNC: 32.5 G/DL (ref 31.5–36.5)
MCHC RBC AUTO-ENTMCNC: 32.7 G/DL (ref 31.5–36.5)
MCHC RBC AUTO-ENTMCNC: 34.7 G/DL (ref 31.5–36.5)
MCV RBC AUTO: 77 FL (ref 78–100)
MCV RBC AUTO: 79 FL (ref 78–100)
MCV RBC AUTO: 80 FL (ref 78–100)
MCV RBC AUTO: 81 FL (ref 78–100)
MONOCYTES # BLD AUTO: 0.8 10E9/L (ref 0–1.3)
MONOCYTES # BLD AUTO: 0.8 10E9/L (ref 0–1.3)
MONOCYTES # BLD AUTO: 0.9 10E9/L (ref 0–1.3)
MONOCYTES NFR BLD AUTO: 10.8 %
MONOCYTES NFR BLD AUTO: 13 %
MONOCYTES NFR BLD AUTO: 15 %
MONOS+MACROS NFR FLD MANUAL: 83 %
MUCOUS THREADS #/AREA URNS LPF: PRESENT /LPF
NEUTROPHILS # BLD AUTO: 3 10E9/L (ref 1.6–8.3)
NEUTROPHILS # BLD AUTO: 3.4 10E9/L (ref 1.6–8.3)
NEUTROPHILS # BLD AUTO: 5.6 10E9/L (ref 1.6–8.3)
NEUTROPHILS NFR BLD AUTO: 50 %
NEUTROPHILS NFR BLD AUTO: 56.7 %
NEUTROPHILS NFR BLD AUTO: 74.2 %
NEUTS BAND NFR FLD MANUAL: 17 %
NITRATE UR QL: NEGATIVE
NRBC # BLD AUTO: 0 10*3/UL
NRBC BLD AUTO-RTO: 0 /100
NT-PROBNP SERPL-MCNC: ABNORMAL PG/ML (ref 0–1800)
O2/TOTAL GAS SETTING VFR VENT: ABNORMAL %
OXYHGB MFR BLDV: 86 %
PCO2 BLDV: 34 MM HG (ref 40–50)
PH BLDV: 7.45 PH (ref 7.32–7.43)
PH FLD: 7.5 PH
PH UR STRIP: 6 PH (ref 4.7–8)
PLATELET # BLD AUTO: 208 10E9/L (ref 150–450)
PLATELET # BLD AUTO: 246 10E9/L (ref 150–450)
PLATELET # BLD AUTO: 300 10E9/L (ref 150–450)
PLATELET # BLD AUTO: 343 10E9/L (ref 150–450)
PO2 BLDV: 55 MM HG (ref 25–47)
POTASSIUM BLD-SCNC: 3.8 MMOL/L (ref 3.4–5.3)
POTASSIUM SERPL-SCNC: 3.8 MMOL/L (ref 3.4–5.3)
POTASSIUM SERPL-SCNC: 3.8 MMOL/L (ref 3.4–5.3)
POTASSIUM SERPL-SCNC: 4.1 MMOL/L (ref 3.4–5.3)
POTASSIUM SERPL-SCNC: 4.9 MMOL/L (ref 3.4–5.3)
PROCALCITONIN SERPL-MCNC: 0.05 NG/ML
PROT FLD-MCNC: 2.1 G/DL
PROT SERPL-MCNC: 6.9 G/DL (ref 6.8–8.8)
PROT SERPL-MCNC: 7.1 G/DL (ref 6.8–8.8)
PROT SERPL-MCNC: 7.4 G/DL (ref 6.8–8.8)
RBC # BLD AUTO: 4.15 10E12/L (ref 3.8–5.2)
RBC # BLD AUTO: 4.48 10E12/L (ref 3.8–5.2)
RBC # BLD AUTO: 4.63 10E12/L (ref 3.8–5.2)
RBC # BLD AUTO: 4.88 10E12/L (ref 3.8–5.2)
RBC # FLD: 1922 /UL
RBC #/AREA URNS AUTO: 1 /HPF (ref 0–2)
RSV RNA SPEC QL NAA+PROBE: NEGATIVE
SARS-COV-2 RNA RESP QL NAA+PROBE: POSITIVE
SODIUM SERPL-SCNC: 124 MMOL/L (ref 133–144)
SODIUM SERPL-SCNC: 130 MMOL/L (ref 133–144)
SODIUM SERPL-SCNC: 133 MMOL/L (ref 133–144)
SODIUM SERPL-SCNC: 134 MMOL/L (ref 133–144)
SODIUM SERPL-SCNC: 136 MMOL/L (ref 133–144)
SOURCE: ABNORMAL
SP GR UR STRIP: 1.02 (ref 1–1.03)
SPECIMEN SOURCE FLD: NORMAL
SPECIMEN SOURCE: ABNORMAL
SPECIMEN SOURCE: NORMAL
SPECIMEN SOURCE: NORMAL
SQUAMOUS #/AREA URNS AUTO: 0 /HPF (ref 0–1)
TROPONIN I SERPL-MCNC: 0.03 UG/L (ref 0–0.04)
TROPONIN I SERPL-MCNC: 0.06 UG/L (ref 0–0.04)
TROPONIN I SERPL-MCNC: 13.5 UG/L (ref 0–0.04)
UROBILINOGEN UR STRIP-MCNC: NORMAL MG/DL (ref 0–2)
WBC # BLD AUTO: 4.5 10E9/L (ref 4–11)
WBC # BLD AUTO: 6 10E9/L (ref 4–11)
WBC # BLD AUTO: 6 10E9/L (ref 4–11)
WBC # BLD AUTO: 7.5 10E9/L (ref 4–11)
WBC # FLD AUTO: 434 /UL
WBC #/AREA URNS AUTO: 3 /HPF (ref 0–5)

## 2021-01-01 PROCEDURE — 83986 ASSAY PH BODY FLUID NOS: CPT | Performed by: EMERGENCY MEDICINE

## 2021-01-01 PROCEDURE — 120N000001 HC R&B MED SURG/OB

## 2021-01-01 PROCEDURE — 250N000011 HC RX IP 250 OP 636

## 2021-01-01 PROCEDURE — 83615 LACTATE (LD) (LDH) ENZYME: CPT | Performed by: NURSE PRACTITIONER

## 2021-01-01 PROCEDURE — 93010 ELECTROCARDIOGRAM REPORT: CPT | Performed by: INTERNAL MEDICINE

## 2021-01-01 PROCEDURE — 36415 COLL VENOUS BLD VENIPUNCTURE: CPT | Performed by: FAMILY MEDICINE

## 2021-01-01 PROCEDURE — 80048 BASIC METABOLIC PNL TOTAL CA: CPT | Performed by: NURSE PRACTITIONER

## 2021-01-01 PROCEDURE — 84145 PROCALCITONIN (PCT): CPT | Performed by: EMERGENCY MEDICINE

## 2021-01-01 PROCEDURE — 71046 X-RAY EXAM CHEST 2 VIEWS: CPT

## 2021-01-01 PROCEDURE — 88108 CYTOPATH CONCENTRATE TECH: CPT | Mod: TC | Performed by: EMERGENCY MEDICINE

## 2021-01-01 PROCEDURE — 84155 ASSAY OF PROTEIN SERUM: CPT | Performed by: NURSE PRACTITIONER

## 2021-01-01 PROCEDURE — 250N000011 HC RX IP 250 OP 636: Performed by: EMERGENCY MEDICINE

## 2021-01-01 PROCEDURE — 71045 X-RAY EXAM CHEST 1 VIEW: CPT

## 2021-01-01 PROCEDURE — C9803 HOPD COVID-19 SPEC COLLECT: HCPCS

## 2021-01-01 PROCEDURE — 84157 ASSAY OF PROTEIN OTHER: CPT | Performed by: EMERGENCY MEDICINE

## 2021-01-01 PROCEDURE — 87636 SARSCOV2 & INF A&B AMP PRB: CPT | Performed by: EMERGENCY MEDICINE

## 2021-01-01 PROCEDURE — 85025 COMPLETE CBC W/AUTO DIFF WBC: CPT | Performed by: NURSE PRACTITIONER

## 2021-01-01 PROCEDURE — 99239 HOSP IP/OBS DSCHRG MGMT >30: CPT | Performed by: NURSE PRACTITIONER

## 2021-01-01 PROCEDURE — 85025 COMPLETE CBC W/AUTO DIFF WBC: CPT | Performed by: PHYSICIAN ASSISTANT

## 2021-01-01 PROCEDURE — 250N000013 HC RX MED GY IP 250 OP 250 PS 637: Performed by: PHYSICIAN ASSISTANT

## 2021-01-01 PROCEDURE — 99291 CRITICAL CARE FIRST HOUR: CPT | Mod: 25 | Performed by: EMERGENCY MEDICINE

## 2021-01-01 PROCEDURE — 82945 GLUCOSE OTHER FLUID: CPT | Performed by: EMERGENCY MEDICINE

## 2021-01-01 PROCEDURE — 96375 TX/PRO/DX INJ NEW DRUG ADDON: CPT

## 2021-01-01 PROCEDURE — 85025 COMPLETE CBC W/AUTO DIFF WBC: CPT | Performed by: EMERGENCY MEDICINE

## 2021-01-01 PROCEDURE — 87070 CULTURE OTHR SPECIMN AEROBIC: CPT | Performed by: NURSE PRACTITIONER

## 2021-01-01 PROCEDURE — 999N000065 XR CHEST PORT 1 VW

## 2021-01-01 PROCEDURE — 36415 COLL VENOUS BLD VENIPUNCTURE: CPT | Performed by: NURSE PRACTITIONER

## 2021-01-01 PROCEDURE — 93306 TTE W/DOPPLER COMPLETE: CPT | Mod: 26 | Performed by: INTERNAL MEDICINE

## 2021-01-01 PROCEDURE — 71275 CT ANGIOGRAPHY CHEST: CPT

## 2021-01-01 PROCEDURE — 84484 ASSAY OF TROPONIN QUANT: CPT | Performed by: FAMILY MEDICINE

## 2021-01-01 PROCEDURE — 999N001014 HC STATISTIC CYTO WRIGHT STAIN TC: Performed by: EMERGENCY MEDICINE

## 2021-01-01 PROCEDURE — 80053 COMPREHEN METABOLIC PANEL: CPT | Performed by: FAMILY MEDICINE

## 2021-01-01 PROCEDURE — 94799 UNLISTED PULMONARY SVC/PX: CPT

## 2021-01-01 PROCEDURE — 250N000013 HC RX MED GY IP 250 OP 250 PS 637: Performed by: NURSE PRACTITIONER

## 2021-01-01 PROCEDURE — 99223 1ST HOSP IP/OBS HIGH 75: CPT | Mod: AI | Performed by: NURSE PRACTITIONER

## 2021-01-01 PROCEDURE — 96365 THER/PROPH/DIAG IV INF INIT: CPT

## 2021-01-01 PROCEDURE — 99285 EMERGENCY DEPT VISIT HI MDM: CPT | Mod: 25

## 2021-01-01 PROCEDURE — 36415 COLL VENOUS BLD VENIPUNCTURE: CPT | Performed by: PHYSICIAN ASSISTANT

## 2021-01-01 PROCEDURE — 93005 ELECTROCARDIOGRAM TRACING: CPT

## 2021-01-01 PROCEDURE — 84484 ASSAY OF TROPONIN QUANT: CPT | Performed by: EMERGENCY MEDICINE

## 2021-01-01 PROCEDURE — 84484 ASSAY OF TROPONIN QUANT: CPT | Performed by: PHYSICIAN ASSISTANT

## 2021-01-01 PROCEDURE — 88305 TISSUE EXAM BY PATHOLOGIST: CPT | Mod: TC | Performed by: EMERGENCY MEDICINE

## 2021-01-01 PROCEDURE — 86140 C-REACTIVE PROTEIN: CPT | Performed by: EMERGENCY MEDICINE

## 2021-01-01 PROCEDURE — 82805 BLOOD GASES W/O2 SATURATION: CPT | Performed by: EMERGENCY MEDICINE

## 2021-01-01 PROCEDURE — 85027 COMPLETE CBC AUTOMATED: CPT | Performed by: NURSE PRACTITIONER

## 2021-01-01 PROCEDURE — 80053 COMPREHEN METABOLIC PANEL: CPT | Performed by: PHYSICIAN ASSISTANT

## 2021-01-01 PROCEDURE — 83605 ASSAY OF LACTIC ACID: CPT | Performed by: EMERGENCY MEDICINE

## 2021-01-01 PROCEDURE — 250N000013 HC RX MED GY IP 250 OP 250 PS 637: Performed by: EMERGENCY MEDICINE

## 2021-01-01 PROCEDURE — 80048 BASIC METABOLIC PNL TOTAL CA: CPT | Performed by: EMERGENCY MEDICINE

## 2021-01-01 PROCEDURE — 999N000157 HC STATISTIC RCP TIME EA 10 MIN

## 2021-01-01 PROCEDURE — 32555 ASPIRATE PLEURA W/ IMAGING: CPT | Performed by: EMERGENCY MEDICINE

## 2021-01-01 PROCEDURE — 36415 COLL VENOUS BLD VENIPUNCTURE: CPT

## 2021-01-01 PROCEDURE — 999N001018 HC STATISTIC H-CELL BLOCK W/STAIN: Performed by: EMERGENCY MEDICINE

## 2021-01-01 PROCEDURE — 99232 SBSQ HOSP IP/OBS MODERATE 35: CPT | Performed by: NURSE PRACTITIONER

## 2021-01-01 PROCEDURE — 258N000003 HC RX IP 258 OP 636: Performed by: NURSE PRACTITIONER

## 2021-01-01 PROCEDURE — 0W993ZZ DRAINAGE OF RIGHT PLEURAL CAVITY, PERCUTANEOUS APPROACH: ICD-10-PCS | Performed by: EMERGENCY MEDICINE

## 2021-01-01 PROCEDURE — 83880 ASSAY OF NATRIURETIC PEPTIDE: CPT | Performed by: EMERGENCY MEDICINE

## 2021-01-01 PROCEDURE — 81001 URINALYSIS AUTO W/SCOPE: CPT | Performed by: PHYSICIAN ASSISTANT

## 2021-01-01 PROCEDURE — 99284 EMERGENCY DEPT VISIT MOD MDM: CPT | Performed by: PHYSICIAN ASSISTANT

## 2021-01-01 PROCEDURE — 93306 TTE W/DOPPLER COMPLETE: CPT

## 2021-01-01 PROCEDURE — 89051 BODY FLUID CELL COUNT: CPT | Performed by: EMERGENCY MEDICINE

## 2021-01-01 PROCEDURE — 83615 LACTATE (LD) (LDH) ENZYME: CPT | Performed by: EMERGENCY MEDICINE

## 2021-01-01 PROCEDURE — 87205 SMEAR GRAM STAIN: CPT | Performed by: NURSE PRACTITIONER

## 2021-01-01 RX ORDER — ISOSORBIDE MONONITRATE 30 MG/1
30 TABLET, EXTENDED RELEASE ORAL AT BEDTIME
Status: DISCONTINUED | OUTPATIENT
Start: 2021-01-01 | End: 2021-01-01

## 2021-01-01 RX ORDER — ISOSORBIDE MONONITRATE 30 MG/1
TABLET, EXTENDED RELEASE ORAL
Qty: 90 TABLET | Refills: 0 | Status: SHIPPED | OUTPATIENT
Start: 2021-01-01

## 2021-01-01 RX ORDER — METOPROLOL SUCCINATE 100 MG/1
100 TABLET, EXTENDED RELEASE ORAL DAILY
Status: DISCONTINUED | OUTPATIENT
Start: 2021-01-01 | End: 2021-01-01

## 2021-01-01 RX ORDER — LOSARTAN POTASSIUM 100 MG/1
100 TABLET ORAL DAILY
Status: DISCONTINUED | OUTPATIENT
Start: 2021-01-01 | End: 2021-01-01 | Stop reason: HOSPADM

## 2021-01-01 RX ORDER — ESCITALOPRAM OXALATE 10 MG/1
10 TABLET ORAL DAILY
COMMUNITY

## 2021-01-01 RX ORDER — IOPAMIDOL 755 MG/ML
100 INJECTION, SOLUTION INTRAVASCULAR ONCE
Status: COMPLETED | OUTPATIENT
Start: 2021-01-01 | End: 2021-01-01

## 2021-01-01 RX ORDER — POLYETHYLENE GLYCOL 3350 17 G/17G
17 POWDER, FOR SOLUTION ORAL DAILY PRN
Status: DISCONTINUED | OUTPATIENT
Start: 2021-01-01 | End: 2021-01-01 | Stop reason: HOSPADM

## 2021-01-01 RX ORDER — PANTOPRAZOLE SODIUM 40 MG/1
40 TABLET, DELAYED RELEASE ORAL DAILY
Status: DISCONTINUED | OUTPATIENT
Start: 2021-01-01 | End: 2021-01-01 | Stop reason: HOSPADM

## 2021-01-01 RX ORDER — FUROSEMIDE 20 MG
20 TABLET ORAL DAILY
Qty: 30 TABLET | Refills: 0 | Status: SHIPPED | OUTPATIENT
Start: 2021-01-01

## 2021-01-01 RX ORDER — SODIUM CHLORIDE 9 MG/ML
INJECTION, SOLUTION INTRAVENOUS CONTINUOUS
Status: CANCELLED | OUTPATIENT
Start: 2021-01-01

## 2021-01-01 RX ORDER — NITROGLYCERIN 20 MG/100ML
10-200 INJECTION INTRAVENOUS CONTINUOUS
Status: DISCONTINUED | OUTPATIENT
Start: 2021-01-01 | End: 2021-01-01

## 2021-01-01 RX ORDER — LIDOCAINE 40 MG/G
CREAM TOPICAL
Status: DISCONTINUED | OUTPATIENT
Start: 2021-01-01 | End: 2021-01-01 | Stop reason: HOSPADM

## 2021-01-01 RX ORDER — MORPHINE SULFATE 2 MG/ML
2 INJECTION, SOLUTION INTRAMUSCULAR; INTRAVENOUS ONCE
Status: COMPLETED | OUTPATIENT
Start: 2021-01-01 | End: 2021-01-01

## 2021-01-01 RX ORDER — SODIUM CHLORIDE 9 MG/ML
INJECTION, SOLUTION INTRAVENOUS CONTINUOUS
Status: DISCONTINUED | OUTPATIENT
Start: 2021-01-01 | End: 2021-01-01

## 2021-01-01 RX ORDER — NITROGLYCERIN 0.4 MG/1
TABLET SUBLINGUAL
Qty: 25 TABLET | Refills: 0 | Status: SHIPPED | OUTPATIENT
Start: 2021-01-01

## 2021-01-01 RX ORDER — FUROSEMIDE 10 MG/ML
20 INJECTION INTRAMUSCULAR; INTRAVENOUS ONCE
Status: COMPLETED | OUTPATIENT
Start: 2021-01-01 | End: 2021-01-01

## 2021-01-01 RX ORDER — ISOSORBIDE MONONITRATE 30 MG/1
30 TABLET, EXTENDED RELEASE ORAL ONCE
Status: DISCONTINUED | OUTPATIENT
Start: 2021-01-01 | End: 2021-01-01

## 2021-01-01 RX ORDER — CLONIDINE HYDROCHLORIDE 0.1 MG/1
0.1 TABLET ORAL ONCE
Status: COMPLETED | OUTPATIENT
Start: 2021-01-01 | End: 2021-01-01

## 2021-01-01 RX ORDER — LEVOFLOXACIN 500 MG/1
500 TABLET, FILM COATED ORAL DAILY
Qty: 7 TABLET | Refills: 0 | Status: SHIPPED | OUTPATIENT
Start: 2021-01-01 | End: 2021-01-01

## 2021-01-01 RX ORDER — DONEPEZIL HYDROCHLORIDE 5 MG/1
5 TABLET, FILM COATED ORAL AT BEDTIME
Status: DISCONTINUED | OUTPATIENT
Start: 2021-01-01 | End: 2021-01-01 | Stop reason: HOSPADM

## 2021-01-01 RX ORDER — CLONIDINE HYDROCHLORIDE 0.1 MG/1
0.2 TABLET ORAL 2 TIMES DAILY
Status: DISCONTINUED | OUTPATIENT
Start: 2021-01-01 | End: 2021-01-01

## 2021-01-01 RX ORDER — PANTOPRAZOLE SODIUM 40 MG/1
TABLET, DELAYED RELEASE ORAL
Qty: 90 TABLET | Refills: 0 | OUTPATIENT
Start: 2021-01-01

## 2021-01-01 RX ORDER — METOPROLOL SUCCINATE 50 MG/1
50 TABLET, EXTENDED RELEASE ORAL DAILY
COMMUNITY

## 2021-01-01 RX ORDER — FUROSEMIDE 20 MG
20 TABLET ORAL DAILY
Status: DISCONTINUED | OUTPATIENT
Start: 2021-01-01 | End: 2021-01-01 | Stop reason: HOSPADM

## 2021-01-01 RX ORDER — ESCITALOPRAM OXALATE 10 MG/1
10 TABLET ORAL DAILY
Status: DISCONTINUED | OUTPATIENT
Start: 2021-01-01 | End: 2021-01-01 | Stop reason: HOSPADM

## 2021-01-01 RX ORDER — METOPROLOL TARTRATE 50 MG
100 TABLET ORAL ONCE
Status: COMPLETED | OUTPATIENT
Start: 2021-01-01 | End: 2021-01-01

## 2021-01-01 RX ORDER — PANTOPRAZOLE SODIUM 40 MG/1
TABLET, DELAYED RELEASE ORAL
Qty: 90 TABLET | Refills: 1 | Status: SHIPPED | OUTPATIENT
Start: 2021-01-01

## 2021-01-01 RX ORDER — ONDANSETRON 2 MG/ML
INJECTION INTRAMUSCULAR; INTRAVENOUS
Status: COMPLETED
Start: 2021-01-01 | End: 2021-01-01

## 2021-01-01 RX ORDER — LEVOFLOXACIN 500 MG/1
500 TABLET, FILM COATED ORAL ONCE
Status: COMPLETED | OUTPATIENT
Start: 2021-01-01 | End: 2021-01-01

## 2021-01-01 RX ORDER — METOPROLOL SUCCINATE 100 MG/1
100 TABLET, EXTENDED RELEASE ORAL DAILY
COMMUNITY

## 2021-01-01 RX ORDER — CLONIDINE HYDROCHLORIDE 0.1 MG/1
0.1 TABLET ORAL 2 TIMES DAILY
Status: DISCONTINUED | OUTPATIENT
Start: 2021-01-01 | End: 2021-01-01

## 2021-01-01 RX ORDER — LOSARTAN POTASSIUM 100 MG/1
100 TABLET ORAL DAILY
COMMUNITY

## 2021-01-01 RX ORDER — LOSARTAN POTASSIUM 100 MG/1
100 TABLET ORAL ONCE
Status: COMPLETED | OUTPATIENT
Start: 2021-01-01 | End: 2021-01-01

## 2021-01-01 RX ORDER — HYDRALAZINE HYDROCHLORIDE 20 MG/ML
10 INJECTION INTRAMUSCULAR; INTRAVENOUS ONCE
Status: COMPLETED | OUTPATIENT
Start: 2021-01-01 | End: 2021-01-01

## 2021-01-01 RX ADMIN — MORPHINE SULFATE 2 MG: 2 INJECTION, SOLUTION INTRAMUSCULAR; INTRAVENOUS at 12:31

## 2021-01-01 RX ADMIN — ESCITALOPRAM OXALATE 10 MG: 10 TABLET ORAL at 17:47

## 2021-01-01 RX ADMIN — FUROSEMIDE 20 MG: 20 TABLET ORAL at 09:16

## 2021-01-01 RX ADMIN — DONEPEZIL HYDROCHLORIDE 5 MG: 5 TABLET, FILM COATED ORAL at 19:42

## 2021-01-01 RX ADMIN — CLONIDINE HYDROCHLORIDE 0.1 MG: 0.1 TABLET ORAL at 15:21

## 2021-01-01 RX ADMIN — CLONIDINE HYDROCHLORIDE 0.2 MG: 0.1 TABLET ORAL at 13:33

## 2021-01-01 RX ADMIN — SODIUM CHLORIDE: 9 INJECTION, SOLUTION INTRAVENOUS at 00:30

## 2021-01-01 RX ADMIN — ONDANSETRON 4 MG: 2 INJECTION INTRAMUSCULAR; INTRAVENOUS at 12:16

## 2021-01-01 RX ADMIN — NITROGLYCERIN 10 MCG/MIN: 20 INJECTION INTRAVENOUS at 11:47

## 2021-01-01 RX ADMIN — IOPAMIDOL 100 ML: 755 INJECTION, SOLUTION INTRAVENOUS at 11:25

## 2021-01-01 RX ADMIN — METOPROLOL SUCCINATE 100 MG: 100 TABLET, EXTENDED RELEASE ORAL at 09:00

## 2021-01-01 RX ADMIN — ESCITALOPRAM OXALATE 10 MG: 10 TABLET ORAL at 09:00

## 2021-01-01 RX ADMIN — PANTOPRAZOLE SODIUM 40 MG: 40 TABLET, DELAYED RELEASE ORAL at 09:15

## 2021-01-01 RX ADMIN — HYDRALAZINE HYDROCHLORIDE 10 MG: 20 INJECTION INTRAMUSCULAR; INTRAVENOUS at 13:22

## 2021-01-01 RX ADMIN — ESCITALOPRAM OXALATE 10 MG: 10 TABLET ORAL at 09:16

## 2021-01-01 RX ADMIN — NITROGLYCERIN 15 MCG/MIN: 20 INJECTION INTRAVENOUS at 12:36

## 2021-01-01 RX ADMIN — PANTOPRAZOLE SODIUM 40 MG: 40 TABLET, DELAYED RELEASE ORAL at 17:47

## 2021-01-01 RX ADMIN — LEVOFLOXACIN 500 MG: 500 TABLET, FILM COATED ORAL at 16:59

## 2021-01-01 RX ADMIN — PANTOPRAZOLE SODIUM 40 MG: 40 TABLET, DELAYED RELEASE ORAL at 09:00

## 2021-01-01 RX ADMIN — FUROSEMIDE 20 MG: 10 INJECTION, SOLUTION INTRAMUSCULAR; INTRAVENOUS at 13:26

## 2021-01-01 RX ADMIN — METOPROLOL SUCCINATE 150 MG: 50 TABLET, EXTENDED RELEASE ORAL at 06:55

## 2021-01-01 RX ADMIN — LOSARTAN POTASSIUM 100 MG: 100 TABLET, FILM COATED ORAL at 10:31

## 2021-01-01 RX ADMIN — SODIUM CHLORIDE: 9 INJECTION, SOLUTION INTRAVENOUS at 16:55

## 2021-01-01 RX ADMIN — METOPROLOL TARTRATE 100 MG: 50 TABLET, FILM COATED ORAL at 10:10

## 2021-01-01 RX ADMIN — LOSARTAN POTASSIUM 100 MG: 100 TABLET, FILM COATED ORAL at 06:55

## 2021-01-01 RX ADMIN — SODIUM CHLORIDE 500 ML: 9 INJECTION, SOLUTION INTRAVENOUS at 16:11

## 2021-01-01 RX ADMIN — LOSARTAN POTASSIUM 100 MG: 100 TABLET, FILM COATED ORAL at 09:00

## 2021-01-01 RX ADMIN — DONEPEZIL HYDROCHLORIDE 5 MG: 5 TABLET, FILM COATED ORAL at 20:49

## 2021-01-01 ASSESSMENT — MIFFLIN-ST. JEOR
SCORE: 857.5
SCORE: 858.5
SCORE: 858.5

## 2021-01-01 ASSESSMENT — ACTIVITIES OF DAILY LIVING (ADL)
ADLS_ACUITY_SCORE: 17
ADLS_ACUITY_SCORE: 12
ADLS_ACUITY_SCORE: 17
ADLS_ACUITY_SCORE: 21
ADLS_ACUITY_SCORE: 16
ADLS_ACUITY_SCORE: 16
ADLS_ACUITY_SCORE: 23
ADLS_ACUITY_SCORE: 16
ADLS_ACUITY_SCORE: 17
ADLS_ACUITY_SCORE: 16
ADLS_ACUITY_SCORE: 17
ADLS_ACUITY_SCORE: 16

## 2021-01-19 NOTE — ED PROVIDER NOTES
"  History     Chief Complaint   Patient presents with     Hypertension     concerned about hypertension, bp in triage 184/81. denies pain.      Abdominal Pain     notes abd pain earlier, denies pain at present. c/o \"I have a hernia but they won't touch it\".      HPI  Lauren Barron is a 89 year old female who presents here with 3-day history of fatigue weakness sleeping more than usual.  She is less active.  She had some abdominal pain earlier upper abdomen she has a hernia but it is soft and reducible.  No abdominal pain no no chest pain no little concerned about her elevated blood pressure she has had recently increased blood pressure medications they doubled her metoprolol and they doubled her losartan recently as well.  She denies any headache she denies any cough chest pain shortness of breath denies abdominal pain currently no nausea vomiting diarrhea no diaphoresis no rashes no injuries or falls.    Allergies:  Allergies   Allergen Reactions     [place] Juice Other (See Comments), Nausea and GI Disturbance     Vertigo     Bactrim [Sulfamethoxazole W/Trimethoprim]      Hyponatremia     Food      Oranges.     Naprosyn [Naproxen]      Incontinence       Niacin Swelling       Problem List:    Patient Active Problem List    Diagnosis Date Noted     Metabolic encephalopathy 11/12/2020     Priority: Medium     Chronic anemia 11/12/2020     Priority: Medium     Acute CHF (congestive heart failure) (H) 02/19/2020     Priority: Medium     Acute hyponatremia 10/14/2019     Priority: Medium     Hyponatremia 10/14/2019     Priority: Medium     Duodenal ulcer 10/03/2019     Priority: Medium     Acute GI bleeding 09/25/2019     Priority: Medium     Other emphysema (H) 10/29/2018     Priority: Medium     CKD (chronic kidney disease) stage 3, GFR 30-59 ml/min (H) 11/20/2017     Priority: Medium     Pulmonary nodules 06/08/2017     Priority: Medium     One, left lung, repeat chest CT 6 months, 12/2017       Benign essential " hypertension 12/05/2016     Priority: Medium     Splenic artery aneurysm (H) 10/06/2016     Priority: Medium     Pulmonary fibrosis (H) 10/06/2016     Priority: Medium     Hiatal hernia 09/08/2016     Priority: Medium     Sick sinus syndrome (H) 06/14/2016     Priority: Medium     Pacemaker placed 4/2016       Presence of cardiac pacemaker 03/28/2016     Priority: Medium     Overview:   [place] [address] [id number]  SN#[id number]  3/28/2016  Atr lead [place] 4469  SN#[id number]  3/28/2016  Vent lead [place] 4470  SN#[id number]  3/28/2016  Dr Bradley  Bradycardia       Coronary arteriosclerosis in native artery 03/15/2016     Priority: Medium     Osteoarthritis 09/21/2015     Priority: Medium     Moderate aortic insufficiency 06/02/2015     Priority: Medium     moderate, aortic sclerosis without stenosis  echo 5/6/2015       Moderate mitral insufficiency 06/02/2015     Priority: Medium     moderate, echo 5/6/2015       Bradycardia 05/05/2015     Priority: Medium     Due to Metoprolol and amlodipine       Epistaxis, recurrent 05/01/2013     Priority: Medium     Mammogram declined 12/20/2012     Priority: Medium     Epistaxis 10/20/2012     Priority: Medium     Gastrointestinal hemorrhage 10/16/2012     Priority: Medium     Thrombocytopenia (H) 10/16/2012     Priority: Medium     Acute posthemorrhagic anemia 10/15/2012     Priority: Medium     Cystocele 03/22/2012     Priority: Medium     Advance Care Planning 03/16/2012     Priority: Medium     Advance Care Planning 8/31/2015: Receipt of ACP document:  Received: Health Care Directive which was witnessed or notarized on 8/20/15.  Document previously scanned on 8/28/15.  Validation form completed and scanned.  Code Status reflects choices in most recent ACP document.  Confirmed/documented designated decision maker(s).  Added by Kerrie Joaquin, RN, BSN, MA, Advance Care Planning Liaison.  Advance Care Planning 8/5/2015: Receipt of ACP document:  Received: POLST which was signed and dated by  provider on 7-21-15.  Document previously scanned on 7-22-15.  Order reviewed and found to be valid.  Code Status reflects choices in most recent ACP document.  Confirmed/documented designated decision maker(s).  Added by Jessica Gregorio RN Advance Care Planning Liaison with [facility]  Advance Care Planning 8/5/2015: Receipt of ACP document:  Received: invalid HCD document dated 2-22-15.  Document not previously scanned. Validation form completed indicating invalid document. Validation form sent to be scanned as notation of invalid document received.  Code Status reflects choices in most recent ACP document.  Confirmed/documented designated decision maker(s).  Added by Jessica Gregorio RN Advance Care Planning Liaison with [facility]       Diastolic dysfunction 03/12/2012     Priority: Medium     Atherosclerosis of aorta (H) 03/12/2012     Priority: Medium     ECHO 2/2012       GI bleed 07/25/2011     Priority: Medium     EGD-H Pylori, treated, Colonoscopy small Polyp - transfused 3 units of blood.       Allergic rhinitis 07/25/2011     Priority: Medium     Problem list name updated by automated process. Provider to review       Meniere's disease 07/25/2011     Priority: Medium     Problem list name updated by automated process. 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 Provider to review       Essential hypertension 12/06/1999     Priority: Medium     Problem list name updated by automated process. Provider to review          Past Medical History:    Past Medical History:   Diagnosis Date      Allergic rhinitis, cause unspecified 2011     Aneurysm of splenic artery (H) 2014     Aortic insufficiency 2015     ASCVD (arteriosclerotic cardiovascular disease) 2009     Atherosclerosis of aorta (H) 2012     Cystocele 2012     Diastolic dysfunction 2012     GI bleed 2011     Hyperlipidemia 2003     Hypertension 1999     Mammogram declined 2012     Meniere's disease, unspecified 2011     Mitral regurgitation 2015     Osteoarthritis 2000     Sick sinus syndrome (H) 2016     Squamous cell skin cancer, ala nasi 2006     Transient global amnesia 2004       Past Surgical History:    Past Surgical History:   Procedure Laterality Date     APPENDECTOMY       ARTHROSCOPY KNEE      RIGHT - Osteoarthritis     C TOTAL KNEE ARTHROPLASTY      LEFT - Osteoarthritis - [facility]     C TOTAL KNEE ARTHROPLASTY      RIGHT - Osteoarthritis - [facility]     CATARACT EXTRACTION and LENS IMPLANTATION      BILATERAL     COLONOSCOPY       Colonoscopy with Polypectomy      GI BLEED - DR. DEL CASTILLO     ESOPHAGOSCOPY, GASTROSCOPY, DUODENOSCOPY (EGD), COMBINED N/A 2019    Procedure: UPPER ENDOSCOPY WITH BIOPSY;  Surgeon: Jorge Freeman MD;  Location: [facility] OR            HC REMOVAL OF OVARIAN CYST(S)       HYSTERECTOMY, YANG      Metorrhagia     IMPLANT PACEMAKER  2016     Left Subclavian Line, Triple Lumen      Gastric Ulcer, Dieulfoy lesion, hemostatic clips placed - Massive GI Bleed - Transferred to [facility]     RELEASE CARPAL TUNNEL      RIGHT - Carpal Tunnel Syndrome     REPAIR BLADDER       SIGMOIDOSCOPY FLEXIBLE N/A 2019    Procedure: FLEXIBLE SIGMOIDOSCOPY;  Surgeon: Jorge Freeman MD;  Location: [facility] OR       Family History:    Family History   Problem Relation Age of Onset     Cancer Brother         Pancreatic     Cancer Brother         Prostate     Cancer Sister          Breast     Hypertension Brother      Hypertension Father      Hypertension Sister      Hypertension Mother      Aneurysm Daughter 68        cerebral, sudden death     Osteoporosis Other      Thyroid Disease No family hx of      Diabetes No family hx of        Social History:  Marital Status:   [5]  Social History     Tobacco Use     Smoking status: Never Smoker     Smokeless tobacco: Never Used   Substance Use Topics     Alcohol use: No     Alcohol/week: 0.0 standard drinks     Drug use: No        Medications:         levofloxacin (LEVAQUIN) 500 MG tablet       acetaminophen 500 MG PO tablet       furosemide (LASIX) 20 MG tablet       isosorbide mononitrate (IMDUR) 30 MG 24 hr tablet       LORazepam (ATIVAN) 0.5 MG tablet       metoprolol tartrate (LOPRESSOR) 25 MG tablet       Multiple Vitamins-Minerals (MULTIVITAMIN ADULT PO)       nitroGLYcerin (NITROSTAT) 0.4 MG sublingual tablet       pantoprazole (PROTONIX) 40 MG EC tablet          Review of Systems  I did do a complete 10 point review of systems. Pertinent positives and negatives listed per HPI. All other systems have been reviewed and are negative.      Physical Exam   BP: (!) 184/81  Pulse: 62  Temp: 96.4  F (35.8  C)  Resp: 16  SpO2: 95 %      Physical Exam  89-year-old female no acute distress at this time.  Nontoxic appearance.  She is pleasant and answers appropriately.  Head and face normocephalic atraumatic.  Conjunctive sclera clear.  Neck supple.  Lungs she does have some crackles bilateral bases.  Cardiovascular heart rate 62 peripheral pulses present.  Abdomen soft nontender easily reducible hernia.  Neurologic she is awake she answers appropriately she was recently diagnosed with dementia.  ED Course              Results for orders placed or performed during the hospital encounter of 01/19/21 (from the past 24 hour(s))   UA reflex to Microscopic and Culture    Specimen: Midstream Urine   Result Value Ref Range    Color Urine Yellow      Appearance Urine Clear     Glucose Urine Negative NEG^Negative mg/dL    Bilirubin Urine Negative NEG^Negative    Ketones Urine Negative NEG^Negative mg/dL    Specific Gravity Urine 1.017 1.003 - 1.035    Blood Urine Trace (A) NEG^Negative    pH Urine 6.0 4.7 - 8.0 pH    Protein Albumin Urine 100 (A) NEG^Negative mg/dL    Urobilinogen mg/dL Normal 0.0 - 2.0 mg/dL    Nitrite Urine Negative NEG^Negative    Leukocyte Esterase Urine Negative NEG^Negative    Source Midstream Urine     RBC Urine 1 0 - 2 /HPF    WBC Urine 3 0 - 5 /HPF    Bacteria Urine Few (A) NEG^Negative /HPF    Squamous Epithelial /HPF Urine 0 0 - 1 /HPF    Mucous Urine Present (A) NEG^Negative /LPF   CBC with platelets differential   Result Value Ref Range    WBC 7.5 4.0 - 11.0 10e9/L    RBC Count 4.48 3.8 - 5.2 10e12/L    Hemoglobin 12.0 11.7 - 15.7 g/dL    Hematocrit 34.6 (L) 35.0 - 47.0 %    MCV 77 (L) 78 - 100 fl    MCH 26.8 26.5 - 33.0 pg    MCHC 34.7 31.5 - 36.5 g/dL    RDW 14.5 10.0 - 15.0 %    Platelet Count 343 150 - 450 10e9/L    Diff Method Automated Method     % Neutrophils 74.2 %    % Lymphocytes 13.7 %    % Monocytes 10.8 %    % Eosinophils 0.5 %    % Basophils 0.3 %    % Immature Granulocytes 0.5 %    Nucleated RBCs 0 0 /100    Absolute Neutrophil 5.6 1.6 - 8.3 10e9/L    Absolute Lymphocytes 1.0 0.8 - 5.3 10e9/L    Absolute Monocytes 0.8 0.0 - 1.3 10e9/L    Absolute Eosinophils 0.0 0.0 - 0.7 10e9/L    Absolute Basophils 0.0 0.0 - 0.2 10e9/L    Abs Immature Granulocytes 0.0 0 - 0.4 10e9/L    Absolute Nucleated RBC 0.0    Comprehensive metabolic panel   Result Value Ref Range    Sodium 124 (L) 133 - 144 mmol/L    Potassium 3.8 3.4 - 5.3 mmol/L    Chloride 89 (L) 94 - 109 mmol/L    Carbon Dioxide 26 20 - 32 mmol/L    Anion Gap 9 3 - 14 mmol/L    Glucose 101 (H) 70 - 99 mg/dL    Urea Nitrogen 18 7 - 30 mg/dL    Creatinine 0.70 0.52 - 1.04 mg/dL    GFR Estimate 74 >60 mL/min/[1.73_m2]    GFR Estimate If Black 85 >60 mL/min/[1.73_m2]    Calcium  8.6 8.5 - 10.1 mg/dL    Bilirubin Total 0.6 0.2 - 1.3 mg/dL    Albumin 3.4 3.4 - 5.0 g/dL    Protein Total 7.1 6.8 - 8.8 g/dL    Alkaline Phosphatase 132 40 - 150 U/L     (H) 0 - 50 U/L    AST 58 (H) 0 - 45 U/L   Troponin I   Result Value Ref Range    Troponin I ES 0.060 (H) 0.000 - 0.045 ug/L   XR Chest Port 1 View    Narrative    Procedure:XR CHEST PORT 1 VW    Clinical history:Female, 89 years, fatigue    Technique: Single view was obtained.    Comparison: 11/12/2020    Findings: The cardiac silhouette is enlarged and partially obscured by  left-sided effusion and areas of consolidation in the left lung base.  The pulmonary vasculature is normal.    The lungs demonstrate areas of consolidation in the left and right  lung base. There is slight blunting of the right costophrenic angle.  Bony structures are unremarkable.      Impression    Impression:   Bilateral basilar pneumonia.    Small a moderate size left and small right-sided pleural effusion.    ERNESTINE MOYER MD       Medications   levofloxacin (LEVAQUIN) tablet 500 mg (has no administration in time range)   cloNIDine (CATAPRES) tablet 0.1 mg (0.1 mg Oral Given 1/19/21 1521)   At this point time I am not very concerned by her blood pressure she was given 0.1 of clonidine.  We did do a medical work-up looking at urine there is no urinary tract infection.  X-ray does show bibasilar bilateral basilar pneumonia.  She did have recent Covid infection I believe is in the mid to late November.  She is not hypoxic.  Her 2 daughters stay with her alternating nights.  At this point time she would like to go home.  Her daughter is comfortable taking her home.  We did give her a dose of Levaquin 500 mg p.o. here.  She is discharged home with 9 more days of Levaquin return here for concerns of be discussed.  I would not be focused on the blood pressure until the patient is feeling better when this resolves that they can work on the blood pressure.    Assessments  & Plan (with Medical Decision Making)     I have reviewed the nursing notes.    I have reviewed the findings, diagnosis, plan and need for follow up with the patient.      New Prescriptions    LEVOFLOXACIN (LEVAQUIN) 500 MG TABLET    Take 1 tablet (500 mg) by mouth daily for 9 days       Final diagnoses:   Pneumonia of both lower lobes due to infectious organism       1/19/2021   [facility]

## 2021-01-19 NOTE — ED NOTES
Patient and daughter accounts differ. Daughter states patient has had problems with emesis and poor appetite. BP has been all over the place. Reports that a home nurse reports that patient has been more lethargic. Reports that patient has a new onset of dementia.

## 2021-02-20 PROBLEM — J90 PLEURAL EFFUSION: Status: ACTIVE | Noted: 2021-01-01

## 2021-02-20 PROBLEM — I16.0 HYPERTENSIVE URGENCY: Status: ACTIVE | Noted: 2021-01-01

## 2021-02-20 PROBLEM — I50.33 ACUTE ON CHRONIC DIASTOLIC CONGESTIVE HEART FAILURE (H): Status: ACTIVE | Noted: 2021-01-01

## 2021-02-20 PROBLEM — R09.02 HYPOXIA: Status: ACTIVE | Noted: 2021-01-01

## 2021-02-20 PROBLEM — E87.1 HYPONATREMIA: Status: ACTIVE | Noted: 2019-10-14

## 2021-02-20 PROBLEM — D64.9 CHRONIC ANEMIA: Status: ACTIVE | Noted: 2020-01-01

## 2021-02-20 PROBLEM — N18.30 CKD (CHRONIC KIDNEY DISEASE) STAGE 3, GFR 30-59 ML/MIN (H): Status: ACTIVE | Noted: 2017-11-20

## 2021-02-20 NOTE — ED NOTES
Pt presents with daughter stating mother had increased SOB last night, dry heaves earlier. Pt was treated for RLL pneumonia in January with a couple of courses of antibiotics (oral). Pt was not hospitalized. Pt has also had visual disturbance in right eye associated with hypertension ( saw Dr. Darling from [facility]). No change in her vision from her baseline today. Daughter states family members have been staying with pt 24 hours a day since October. Pt does not like to be alone and has early onset dementia.

## 2021-02-20 NOTE — ED NOTES
DATE:  2/20/2021   TIME OF RECEIPT FROM LAB:  1048  LAB TEST: COVID  LAB VALUE:  Positive  RESULTS GIVEN WITH READ-BACK TO (PROVIDER):  Dr. Morrison  TIME LAB VALUE REPORTED TO PROVIDER:   1045

## 2021-02-20 NOTE — ED PROVIDER NOTES
History     Chief Complaint   Patient presents with     Shortness of Breath     states that she was dx with pneumonia and completed levaquin ~10 days ago. states she was initially feeling better but not anymore. increased weakness also.      YVAN Barron is a 89 year old female who presents with shortness of breath.  She has a history of diastolic congestive heart failure, recently diagnosed and treated for pneumonia, chronic electrolyte problems including hyponatremia.  She presents with shortness of breath which has been ongoing from some time but worsened over the last couple of days.  Her daughter accompanies her and noticed that she seemed more labored with breathing and was having a cough productive of mostly clear but occasionally greenish sputum.  She has not had any fevers at home.  She denies any chest pain.  She denies any new leg swelling or pain.  Denies any abdominal discomfort.     Allergies:  Allergies   Allergen Reactions     [place] Juice Other (See Comments), Nausea and GI Disturbance     Vertigo     Bactrim [Sulfamethoxazole W/Trimethoprim]      Hyponatremia     Food      Oranges.     Naprosyn [Naproxen]      Incontinence       Niacin Swelling       Problem List:    Patient Active Problem List    Diagnosis Date Noted     Pleural effusion 02/20/2021     Priority: Medium     Hypoxia 02/20/2021     Priority: Medium     Metabolic encephalopathy 11/12/2020     Priority: Medium     Chronic anemia 11/12/2020     Priority: Medium     Acute CHF (congestive heart failure) (H) 02/19/2020     Priority: Medium     Acute hyponatremia 10/14/2019     Priority: Medium     Hyponatremia 10/14/2019     Priority: Medium     Duodenal ulcer 10/03/2019     Priority: Medium     Acute GI bleeding 09/25/2019     Priority: Medium     Other emphysema (H) 10/29/2018     Priority: Medium     CKD (chronic kidney disease) stage 3, GFR 30-59 ml/min (H) 11/20/2017     Priority: Medium     Pulmonary nodules 06/08/2017      Priority: Medium     One, left lung, repeat chest CT 6 months, 12/2017       Benign essential hypertension 12/05/2016     Priority: Medium     Splenic artery aneurysm (H) 10/06/2016     Priority: Medium     Pulmonary fibrosis (H) 10/06/2016     Priority: Medium     Hiatal hernia 09/08/2016     Priority: Medium     Sick sinus syndrome (H) 06/14/2016     Priority: Medium     Pacemaker placed 4/2016       Presence of cardiac pacemaker 03/28/2016     Priority: Medium     Overview:   Saxe Accolade L301  SN#[id number]  3/28/2016  Atr lead Saxe 4469  SN#[id number]  3/28/2016  Vent lead Saxe 4470  SN#[id number]  3/28/2016  Dr Bradley  Bradycardia       Coronary arteriosclerosis in native artery 03/15/2016     Priority: Medium     Osteoarthritis 09/21/2015     Priority: Medium     Moderate aortic insufficiency 06/02/2015     Priority: Medium     moderate, aortic sclerosis without stenosis  echo 5/6/2015       Moderate mitral insufficiency 06/02/2015     Priority: Medium     moderate, echo 5/6/2015       Bradycardia 05/05/2015     Priority: Medium     Due to Metoprolol and amlodipine       Epistaxis, recurrent 05/01/2013     Priority: Medium     Mammogram declined 12/20/2012     Priority: Medium     Epistaxis 10/20/2012     Priority: Medium     Gastrointestinal hemorrhage 10/16/2012     Priority: Medium     Thrombocytopenia (H) 10/16/2012     Priority: Medium     Acute posthemorrhagic anemia 10/15/2012     Priority: Medium     Cystocele 03/22/2012     Priority: Medium     Advance Care Planning 03/16/2012     Priority: Medium     Advance Care Planning 8/31/2015: Receipt of ACP document:  Received: Health Care Directive which was witnessed or notarized on 8/20/15.  Document previously scanned on 8/28/15.  Validation form completed and scanned.  Code Status reflects choices in most recent ACP document.  Confirmed/documented designated decision maker(s).  Added by Kerrie Joaquin, RN, BSN, MA, Advance Care Planning  Liaison.  Advance Care Planning 8/5/2015: Receipt of ACP document:  Received: POLST which was signed and dated by provider on 7-21-15.  Document previously scanned on 7-22-15.  Order reviewed and found to be valid.  Code Status reflects choices in most recent ACP document.  Confirmed/documented designated decision maker(s).  Added by Jessica Gregorio RN Advance Care Planning Liaison with [facility]  Advance Care Planning 8/5/2015: Receipt of ACP document:  Received: invalid HCD document dated 2-22-15.  Document not previously scanned. Validation form completed indicating invalid document. Validation form sent to be scanned as notation of invalid document received.  Code Status reflects choices in most recent ACP document.  Confirmed/documented designated decision maker(s).  Added by Jessica Gregorio RN Advance Care Planning Liaison with [facility]       Diastolic dysfunction 03/12/2012     Priority: Medium     Atherosclerosis of aorta (H) 03/12/2012     Priority: Medium     ECHO 2/2012       GI bleed 07/25/2011     Priority: Medium     EGD-H Pylori, treated, Colonoscopy small Polyp - transfused 3 units of blood.       Allergic rhinitis 07/25/2011     Priority: Medium     Problem list name updated by automated process. Provider to review       Meniere's disease 07/25/2011     Priority: Medium     Problem list name updated by automated process. 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 Provider to review       Essential hypertension 12/06/1999     Priority: Medium     Problem list name updated by  automated process. Provider to review          Past Medical History:    Past Medical History:   Diagnosis Date     Allergic rhinitis, cause unspecified 2011     Aneurysm of splenic artery (H) 2014     Aortic insufficiency 2015     ASCVD (arteriosclerotic cardiovascular disease) 2009     Atherosclerosis of aorta (H) 2012     Cystocele 2012     Diastolic dysfunction 2012     GI bleed 2011     Hyperlipidemia 2003     Hypertension 1999     Mammogram declined 2012     Meniere's disease, unspecified 2011     Mitral regurgitation 2015     Osteoarthritis 2000     Sick sinus syndrome (H) 2016     Squamous cell skin cancer, ala nasi 2006     Transient global amnesia 2004       Past Surgical History:    Past Surgical History:   Procedure Laterality Date     APPENDECTOMY       ARTHROSCOPY KNEE      RIGHT - Osteoarthritis     C TOTAL KNEE ARTHROPLASTY      LEFT - Osteoarthritis - [facility]     C TOTAL KNEE ARTHROPLASTY      RIGHT - Osteoarthritis - [facility]     CATARACT EXTRACTION and LENS IMPLANTATION      BILATERAL     COLONOSCOPY       Colonoscopy with Polypectomy      GI BLEED - DR. DEL CASTILLO     ESOPHAGOSCOPY, GASTROSCOPY, DUODENOSCOPY (EGD), COMBINED N/A 2019    Procedure: UPPER ENDOSCOPY WITH BIOPSY;  Surgeon: Jorge Freeman MD;  Location: [facility]             REMOVAL OF OVARIAN CYST(S)       HYSTERECTOMY, YANG      Metorrhagia     IMPLANT PACEMAKER  2016     Left Subclavian Line, Triple Lumen      Gastric Ulcer, Dieulfoy lesion, hemostatic clips placed - Massive GI Bleed - Transferred to [facility]     RELEASE CARPAL TUNNEL      RIGHT - Carpal Tunnel Syndrome     REPAIR BLADDER       SIGMOIDOSCOPY FLEXIBLE N/A 2019    Procedure: FLEXIBLE SIGMOIDOSCOPY;  Surgeon: Jorge Freeman MD;  Location: [facility] OR       Family History:    Family History   Problem Relation Age  of Onset     Cancer Brother         Pancreatic     Cancer Brother         Prostate     Cancer Sister         Breast     Hypertension Brother      Hypertension Father      Hypertension Sister      Hypertension Mother      Aneurysm Daughter 68        cerebral, sudden death     Osteoporosis Other      Thyroid Disease No family hx of      Diabetes No family hx of        Social History:  Marital Status:   [5]  Social History     Tobacco Use     Smoking status: Never Smoker     Smokeless tobacco: Never Used   Substance Use Topics     Alcohol use: No     Alcohol/week: 0.0 standard drinks     Drug use: No        Medications:    DONEPEZIL HCL PO  escitalopram (LEXAPRO) 10 MG tablet  isosorbide mononitrate (IMDUR) 30 MG 24 hr tablet  losartan (COZAAR) 100 MG tablet  metoprolol succinate ER (TOPROL-XL) 100 MG 24 hr tablet  metoprolol succinate ER (TOPROL-XL) 50 MG 24 hr tablet  pantoprazole (PROTONIX) 40 MG EC tablet  acetaminophen 500 MG PO tablet  Multiple Vitamins-Minerals (MULTIVITAMIN ADULT PO)  nitroGLYcerin (NITROSTAT) 0.4 MG sublingual tablet      Review of Systems   All systems reviewed and negative except as noted in HPI.    Physical Exam   BP: (!) 216/111  Pulse: 68  Temp: 97  F (36.1  C)  Resp: (!) 30  SpO2: 98 %  Constitutional: Frail elderly female patient, laying in bed, mild respiratory distress  HENT: Normocephalic, Atraumatic, Bilateral external ears normal. Neck Supple.  Eyes: EOMI, Conjunctiva normal.  Respiratory: Mild respiratory distress.  Mildly tachypneic.  Lungs are coarse at bilateral bases with some crackles.  Good air movement overall.  Cardiovascular: Normal heart rate, Regular rhythm. Trace peripheral edema.  Abdomen: Soft, nontender  Musculoskeletal: Good range of motion in all major joints. No major deformities noted.  Integument: Warm, Dry.  Neurologic: Alert & awake, Normal motor function, Normal sensory function, No focal deficits noted.   Psychiatric: Cooperative. Mood and affect  normal.      ED Course          EKG Interpretation:      Interpreted by Man Okeefe MD  Time reviewed: 958  Symptoms at time of EKG: Dyspnea  Rhythm: normal sinus   Rate: Normal  Axis: Normal  Ectopy: none  Conduction: normal  ST Segments/ T Waves: No ST-T wave changes  Q Waves: v2 and v3  Comparison to prior: Unchanged from 1/19/20    Clinical Impression: Sinus rhythm with chronic anterior infarct pattern unchanged from previous      Critical Care time:  was 60 minutes for this patient with respiratory failure, hypertensive emergency, and requiring an immediate stabilizing procedure, this time was excluding procedure time.      Results for orders placed or performed during the hospital encounter of 02/20/21 (from the past 24 hour(s))   CBC with platelets differential   Result Value Ref Range    WBC 6.0 4.0 - 11.0 10e9/L    RBC Count 4.88 3.8 - 5.2 10e12/L    Hemoglobin 12.8 11.7 - 15.7 g/dL    Hematocrit 39.1 35.0 - 47.0 %    MCV 80 78 - 100 fl    MCH 26.2 (L) 26.5 - 33.0 pg    MCHC 32.7 31.5 - 36.5 g/dL    RDW 15.7 (H) 10.0 - 15.0 %    Platelet Count 300 150 - 450 10e9/L    Diff Method Automated Method     % Neutrophils 56.7 %    % Lymphocytes 29.0 %    % Monocytes 13.0 %    % Eosinophils 0.5 %    % Basophils 0.5 %    % Immature Granulocytes 0.3 %    Nucleated RBCs 0 0 /100    Absolute Neutrophil 3.4 1.6 - 8.3 10e9/L    Absolute Lymphocytes 1.7 0.8 - 5.3 10e9/L    Absolute Monocytes 0.8 0.0 - 1.3 10e9/L    Absolute Eosinophils 0.0 0.0 - 0.7 10e9/L    Absolute Basophils 0.0 0.0 - 0.2 10e9/L    Abs Immature Granulocytes 0.0 0 - 0.4 10e9/L    Absolute Nucleated RBC 0.0    Basic metabolic panel   Result Value Ref Range    Sodium 130 (L) 133 - 144 mmol/L    Potassium 4.9 3.4 - 5.3 mmol/L    Chloride 102 94 - 109 mmol/L    Carbon Dioxide 23 20 - 32 mmol/L    Anion Gap 5 3 - 14 mmol/L    Glucose 116 (H) 70 - 99 mg/dL    Urea Nitrogen 31 (H) 7 - 30 mg/dL    Creatinine 0.79 0.52 - 1.04 mg/dL    GFR Estimate 63 >60  mL/min/[1.73_m2]    GFR Estimate If Black 73 >60 mL/min/[1.73_m2]    Calcium 9.0 8.5 - 10.1 mg/dL   Troponin I   Result Value Ref Range    Troponin I ES 0.034 0.000 - 0.045 ug/L   Lactic acid whole blood   Result Value Ref Range    Lactic Acid 1.7 0.7 - 2.0 mmol/L   Blood gas venous and oxyhgb   Result Value Ref Range    Ph Venous 7.45 (H) 7.32 - 7.43 pH    PCO2 Venous 34 (L) 40 - 50 mm Hg    PO2 Venous 55 (H) 25 - 47 mm Hg    Bicarbonate Venous 23 21 - 28 mmol/L    FIO2 RA     Oxyhemoglobin Venous 86 %    Base Deficit Venous 0.1 mmol/L   NT pro BNP   Result Value Ref Range    N-Terminal Pro BNP Inpatient 18,034 (H) 0 - 1,800 pg/mL   Symptomatic Influenza A/B & SARS-CoV2 (COVID-19) Virus PCR Multiplex    Specimen: Nasopharyngeal   Result Value Ref Range    Flu A/B & SARS-COV-2 PCR Source Nasopharyngeal     SARS-CoV-2 PCR Result POSITIVE (AA)     Influenza A PCR Negative NEG^Negative    Influenza B PCR Negative NEG^Negative    Respiratory Syncytial Virus PCR Negative NEG^Negative    Flu A/B & SARS-CoV-2 PCR Comment       Testing was performed using the Xpert Xpress SARS-CoV2/Flu/RSV Assay on the [employer]   GeneXpert Instrument. Additional information about the Emergency Use Authorization (EUA)   assay can be found via the Lab Guide.     CRP inflammation   Result Value Ref Range    CRP Inflammation 6.3 0.0 - 8.0 mg/L   Procalcitonin   Result Value Ref Range    Procalcitonin 0.05 ng/ml       Medications   furosemide (LASIX) injection 20 mg (has no administration in time range)   hydrALAZINE (APRESOLINE) injection 10 mg (has no administration in time range)   cloNIDine (CATAPRES) tablet 0.2 mg (has no administration in time range)   losartan (COZAAR) tablet 100 mg (100 mg Oral Given 2/20/21 1031)   metoprolol tartrate (LOPRESSOR) tablet 100 mg (100 mg Oral Given 2/20/21 1010)   sodium chloride (PF) 0.9% PF flush 100 mL (100 mLs Intravenous Given 2/20/21 1125)   iopamidol (ISOVUE-370) solution 100 mL (100 mLs Intravenous  Given 2/20/21 1125)   ondansetron (ZOFRAN) 2 MG/ML injection (4 mg  Given 2/20/21 1216)   morphine (PF) injection 2 mg (2 mg Intravenous Given 2/20/21 1231)     [facility]    Thoracentesis    Date/Time: 2/20/2021 1:21 PM  Performed by: Man Okeefe MD  Authorized by: Man Okeefe MD     ANESTHESIA (see MAR for exact dosages):     Anesthesia method:  Local infiltration    Local anesthetic:  Lidocaine 1% w/o epi    PROCEDURE DETAILS      Patient position:  Sitting    Location:  R midscapular line    Intercostal space:  8th    Puncture method:  Over-the-needle catheter    Ultrasound guidance: yes      Indwelling catheter placed: no      Needle gauge:  16    Catheter size:  8 Fr    Number of attempts:  1    Drainage characteristics:  Clear    POST PROCEDURE DETAILS     Chest x-ray performed: yes      Chest x-ray findings:  Pleural effusion improved    Patient tolerance of procedure:  Patient tolerated the procedure well with no immediate complications    PROCEDURE   Patient Tolerance:  Patient tolerated the procedure well with no immediate complications              Assessments & Plan (with Medical Decision Making)   Patient is a 89-year-old with a history of diastolic congestive heart failure, recent admission for pneumonia, who presented with shortness of breath.  CT scan today showed a large right pleural effusion and also a hilar mass concerning for potential malignant pleural effusion.  She was also very hypertensive on arrival and out of concern for possible flash pulmonary edema given her elevated BNP, she was briefly placed on a nitroglycerin drip which was subsequently discontinued after her CT completed.  She was positive for Covid but not having symptoms back in December, she remains positive on PCR test today so we will continue her isolation although she did not have any groundglass opacities or evidence of pneumonia.  She underwent a urgent thoracentesis with 1300 cc of clear esteban  fluid withdrawn which was sent for fluid analysis.  Seems most likely related to either a malignant effusion or a congestive heart failure exacerbation, does not appear consistent with infection.  I do not think any antibiotics would be indicated right now since she is afebrile, does not have consolidation, has normal white blood cell count.  She was more comfortable after the procedure and should be stable for admission to the floor.  Discussed case with admitting hospitalist.    I have reviewed the nursing notes.    I have reviewed the findings, diagnosis, plan and need for follow up with the patient.      Final diagnoses:   Pleural effusion   Hypoxia       2/20/2021   [facility]     Man Okeefe MD  02/20/21 4917

## 2021-02-20 NOTE — H&P
"[facility]    History and Physical  Hospitalist       Date of Admission:  2/20/2021  Date of Service (when I saw the patient): 02/20/21    Assessment & Plan       Pleural effusion: She has small bilateral pleural effusions on clinic cxr 2/16 but right was listed as mils and left \"tiny\", on chest CT today she has moderate right and small left. She recently had pneumonia RLL that she was treated for 10 days on Levaquin. There continues to be residual right basal opacity. Thoracentesis was performed in ED removing 1400ml straw fluid-this is sent for analysis, She reports immediate improvement in dyspnea. She is not hypoxic or hypercapnic. Etiology is possible parapneumonic effusion versus CHF exacerbation or combination of the two. Gram stain and culture along with cytology ordered as well.        Diastolic congestive heart failure (H): Possible acute component with rapid development of bilateral pleural effusions and suggestion of pulmonary edema on imaging. She received 20mg IV lasix in the ED but has only had minimal urine out thus far.        Hypertensive urgency: She had not taken any of her morning medications prior to coming in to the ED and pressures were as high as 210/110. She has a history of poorly controlled hypertension but recently had improved with changes in her medications. Will add clonidine as she did not have much response to IV hydralazine in the ED.       Essential hypertension: As above      CKD (chronic kidney disease) stage 3, GFR 30-59 ml/min (H):      Hyponatremia: She has long standing history of intermittent hypnoatremia. There was concern that her lasix may be contributing so that was stopped about 2 months ago. Most recent sodium levels in clinic 128 and 130. Today 130. Will wait to see how much lasix diureses her. Fluid restrict as her family feels she is a nervous drinker.      Covid-19: Recovered. Tested positive 11/2020 after being exposed to several family members that " were positive.       DVT Prophylaxis: Pneumatic Compression Devices  Code Status: DNR / DNI    Disposition: Expected discharge in 1-3 days once respiratory status stable.    Heidi Langford, CNP    Primary Care Physician   Selena Christensen    Chief Complaint   Elevated blood pressure at home, shortness of breath    History is obtained from the patient  And her daughter, medical records    History of Present Illness   Lauren Barron is a 89 year old female who presents with progressive shortness of breath over the course of the last 3 weeks. She was treated with a course of Levaquin for 10 days after presenting to the ED with the same complaints on 2/19 with CXR showing possible bibasilar pneumonia. At that time she did not have any fever or leukocytosis but did have a cough. She reports initially she did feel some improvement but then her breathing worsened again. She is currently having an intermittent dry cough over the last 3 weeks. She denies any recent abdominal pain or vomiting. No fevers at home. No changes in urination or stools. She has also been taking her blood pressures at home and recently has noted a trend of worsening.     Past Medical History    I have reviewed this patient's medical history and updated it with pertinent information if needed.   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     Aortic insufficiency 6/2/2015    moderate, aortic sclerosis without stenosis echo 5/6/2015      ASCVD (arteriosclerotic cardiovascular disease) 08/25/2009    Bare metal stent obtuse margnial     Atherosclerosis of aorta (H) 03/12/2012    ECHO 2/2012     Cystocele 03/22/2012     Diastolic dysfunction 03/12/2012     GI bleed 07/25/2011    EGD-H Pylori, treated, Colonoscopy small Polyp - transfused 3 units of  blood.     Hyperlipidemia 2003     Hypertension 1999     Mammogram declined 2012     Meniere's disease, unspecified 2011     Mitral regurgitation 2015    moderate, echo 2015      Osteoarthritis 2000     Sick sinus syndrome (H) 2016    dual chamber pacemaker placement     Squamous cell skin cancer, chiquita coelloi 2006    MOHS Surgery     Transient global amnesia 2004       Past Surgical History   I have reviewed this patient's surgical history and updated it with pertinent information if needed.  Past Surgical History:   Procedure Laterality Date     APPENDECTOMY       ARTHROSCOPY KNEE      RIGHT - Osteoarthritis     C TOTAL KNEE ARTHROPLASTY      LEFT - Osteoarthritis - Ram[facility]rez Ruvalc[facility]     C TOTAL KNEE ARTHROPLASTY      RIGHT - Osteoarthritis - Ramírez Ruvalcaba     CATARACT EXTRACTION and LENS IMPLANTATION      BILATERAL     COLONOSCOPY       Colonoscopy with Polypectomy      GI BLEED - DR. DEL CASTILLO     ESOPHAGOSCOPY, GASTROSCOPY, DUODENOSCOPY (EGD), COMBINED N/A 2019    Procedure: UPPER ENDOSCOPY WITH BIOPSY;  Surgeon: Jorge Freeman MD;  Location: [facility]             REMOVAL OF OVARIAN CYST(S)       HYSTERECTOMY, YANG      Metorrhagia     IMPLANT PACEMAKER  2016     Left Subclavian Line, Triple Lumen      Gastric Ulcer, Dieulfoy lesion, hemostatic clips placed - Massive GI Bleed - Transferred to [facility]     RELEASE CARPAL TUNNEL      RIGHT - Carpal Tunnel Syndrome     REPAIR BLADDER       SIGMOIDOSCOPY FLEXIBLE N/A 2019    Procedure: FLEXIBLE SIGMOIDOSCOPY;  Surgeon: Jorge Freeman MD;  Location: [facility] OR       Prior to Admission Medications   Prior to Admission Medications   Prescriptions Last Dose Informant Patient Reported? Taking?   Cranberry 400 MG CAPS   Yes Yes   Sig: Take 400 mg by mouth 2 times daily   DONEPEZIL HCL PO 2021 at Unknown time  Yes Yes   Sig: Take 5 mg by mouth At Bedtime    Multiple  Vitamins-Minerals (MULTIVITAMIN ADULT PO)  Self Yes No   Sig: Take 1 tablet by mouth daily (Patient uses [facility] Multivitamin, Dr. Lucy lakhani)   acetaminophen 500 MG PO tablet  Self Yes No   Sig: Take 1,000 mg by mouth as needed   escitalopram (LEXAPRO) 10 MG tablet 2/19/2021 at Unknown time  Yes Yes   Sig: Take 10 mg by mouth daily   isosorbide mononitrate (IMDUR) 30 MG 24 hr tablet 2/19/2021 at Unknown time Self No Yes   Sig: TAKE ONE TABLET BY MOUTH AT BEDTIME.   losartan (COZAAR) 100 MG tablet 2/19/2021 at Unknown time  Yes Yes   Sig: Take 100 mg by mouth daily   metoprolol succinate ER (TOPROL-XL) 100 MG 24 hr tablet 2/19/2021 at Unknown time  Yes Yes   Sig: Take 100 mg by mouth daily   metoprolol succinate ER (TOPROL-XL) 50 MG 24 hr tablet 2/19/2021 at Unknown time  Yes Yes   Sig: Take 50 mg by mouth daily   nitroGLYcerin (NITROSTAT) 0.4 MG sublingual tablet  Self No No   Sig: Place 1 tablet (0.4mg) by sublingual route at the first sign of attack; may repeat ever 5 min until relief; if pain persists after 3 tablets in 15 minutes prompt medical attention is recommended. AS NEEDED   pantoprazole (PROTONIX) 40 MG EC tablet 2/19/2021 at Unknown time Self No Yes   Sig: TAKE 1 TABLET BY MOUTH ONCE DAILY.      Facility-Administered Medications: None     Allergies   Allergies   Allergen Reactions     [place] Juice Other (See Comments), Nausea and GI Disturbance     Vertigo     Bactrim [Sulfamethoxazole W/Trimethoprim]      Hyponatremia     Food      Oranges.     Naprosyn [Naproxen]      Incontinence       Niacin Swelling       Social History   I have reviewed this patient's social history and updated it with pertinent information if needed. Lauren Torreiths  reports that she has never smoked. She has never used smokeless tobacco. She reports that she does not drink alcohol or use drugs.    Family History   I have reviewed this patient's family history and updated it with pertinent information if needed.    Family History   Problem Relation Age of Onset     Cancer Brother         Pancreatic     Cancer Brother         Prostate     Cancer Sister         Breast     Hypertension Brother      Hypertension Father      Hypertension Sister      Hypertension Mother      Aneurysm Daughter 68        cerebral, sudden death     Osteoporosis Other      Thyroid Disease No family hx of      Diabetes No family hx of        Review of Systems   CONSTITUTIONAL:  negative for  fevers, chills and sweats  HEENT:  negative for  nasal congestion, sore throat and hoarseness  RESPIRATORY:  negative for  Sputum production, positive for dry cough,dyspnea  CARDIOVASCULAR:  negative for  chest pain, palpitations, exertional chest pressure/discomfort, edema  GASTROINTESTINAL:  negative for nausea, vomiting, change in bowel habits and abdominal pain  MUSCULOSKELETAL:  negative for  myalgias and arthralgias  NEUROLOGICAL:  negative for headaches, dizziness, dysphagia, numbness, syncope and near syncope  BEHAVIOR/PSYCH:  positive for poor appetite, decreased energy level and fatigue    Physical Exam   Temp: 97  F (36.1  C) Temp src: Tympanic BP: 171/71 Pulse: 60   Resp: 23 SpO2: 98 % O2 Device: None (Room air)    Vital Signs with Ranges  Temp:  [97  F (36.1  C)] 97  F (36.1  C)  Pulse:  [60-68] 60  Resp:  [12-38] 23  BP: (152-223)/() 171/71  SpO2:  [82 %-99 %] 98 %  0 lbs 0 oz    Constitutional: Awake,alert, no acute distress though ill appearing  HEENT: Normocephalic, mucous membranes are pink and moist, no cervical lymphadenopathy  Respiratory: right lung crackles in the deep bases but good air movement throughout, wheezes, left lung mid to base diminished   Cardiovascular: HRR, no murmurs, rubs,thrills. No peripheral edema.    GI: Soft,nontender,bowel sounds positive.   Skin: Pale, no unusual rashes or open areas.  Musculoskeletal: Moves herself on cart easily, no joint abnormalities present.   Neurologic: Forgetful, able to answer some  questions, follows commands easily.      Data   Data reviewed today:  I personally reviewed .  Recent Labs   Lab 02/20/21  0955   WBC 6.0   HGB 12.8   MCV 80      *   POTASSIUM 4.9   CHLORIDE 102   CO2 23   BUN 31*   CR 0.79   ANIONGAP 5   PEARL 9.0   *   TROPI 0.034       Recent Results (from the past 24 hour(s))   XR Chest Port 1 View    Narrative    XR CHEST PORT 1 VW    HISTORY: 89 yearsFemale S/p R thoracentesis    TECHNIQUE: A single view of the chest was performed    COMPARISON: 1/19/2021 an CT of 2/20/2021 earlier, before thoracentesis    FINDINGS: There is no evidence of right-sided pneumothorax. There is a  minimal residual volume of pleural fluid in the right hemithorax.  There is right medial basilar airspace opacity.    There is a small left pleural effusion with left basilar airspace  opacity.    Pulmonary vascularity is mildly to moderately prominent.        Impression    IMPRESSION: No evidence of pneumothorax after right thoracentesis.  Near complete resolution of right pleural fluid.    Persistent small left pleural effusion and bilateral basilar airspace  opacity.    Suspect mild to moderate pulmonary edema.    JARVIS CROWE MD

## 2021-02-20 NOTE — ED NOTES
Checked with CT regarding pt's ordered test. Pt is next. Will begin NTG drip upon return per Dr. Okeefe.

## 2021-02-20 NOTE — PLAN OF CARE
[facility] Inpatient Admission Note:    Patient admitted to 3108/3108-1 at approximately 1430 via cart accompanied by daughter from emergency room . Report received from Katy RN in SBAR format at 1355 via telephone. Patient ambulated to bed via self.. Patient is alert and oriented X 3, denies pain; rates at 0 on 0-10 scale.  Patient oriented to room, unit, hourly rounding, and plan of care. Explained admission packet and patient handbook with patient bill of rights brochure. Will continue to monitor and document as needed.     Inpatient Nursing criteria listed below was met:    Health care directives status obtained and documented: Yes    Care Everywhere authorization obtained No    MRSA swab completed for patient 65 years and older: N/A    Patient identifies a surrogate decision maker: Yes If yes, who:Yesica Contact Information:see face sheet     If initial lactic acid >2.0, repeat lactic acid drawn within one hour of arrival to unit: NA. If no, state reason: n/a    Vaccination assessment and education completed: Yes   Vaccinations received prior to admission: Pneumovax yes  Influenza(seasonal)  YES   Vaccination(s) ordered: not given today because up to date    Clergy visit ordered if patient requests: N/A    Skin issues/needs documented: N/A    Isolation Patient: no Education given, correct sign in place and documentation row added to PCS:  No    Fall Prevention Yes: Care plan updated, education given and documented, sticker and magnet in place: Yes    Care Plan initiated: Yes    Education Documented (including assessment): Yes    Patient has discharge needs : No If yes, please explain:n/a      Face to face report given with opportunity to observe patient.    Report given to Keith Arceo RN   2/20/2021  3:32 PM

## 2021-02-20 NOTE — ED NOTES
Thoracentesis completed.Pt tolerated procedure well. Left lower half of lung, slight coarse. Right lung now has good air exchange and =. Pt states she is breathing better. RR went from low 30's to 16 to 20 unlabored.

## 2021-02-21 NOTE — PROGRESS NOTES
"[facility]    Hospitalist Progress Note    Date of Service (when I saw the patient): 02/21/2021    Assessment & Plan     Pleural effusion: She has small bilateral pleural effusions on clinic cxr 2/16 but right was listed as mils and left \"tiny\", on chest CT today she has moderate right and small left. She recently had pneumonia RLL that she was treated for 10 days on Levaquin. There continues to be residual right basal opacity. Thoracentesis was performed in ED removing 1400ml straw fluid-this is sent for analysis, She reports immediate improvement in dyspnea. She is not hypoxic or hypercapnic. Etiology is possible parapneumonic effusion versus CHF exacerbation or combination of the two. Gram stain and culture along with cytology ordered as well.    -2/21: Denies any dyspnea, up in chair. Fluid analysis shows it is transudate. ECHO and repeat CXr tomorrow.        Diastolic congestive heart failure (H): Possible acute component with rapid development of bilateral pleural effusions and suggestion of pulmonary edema on imaging. She received 20mg IV lasix in the ED but has only had minimal urine out thus far.  -2/21: required 500cc bolus for poor urine output and hypotension that resolved after fluid given. Will not give any further fluid today but also will not give and diuresis and see what her output and respiratory status does today.         Hypertensive urgency: She had not taken any of her morning medications prior to coming in to the ED and pressures were as high as 210/110. She has a history of poorly controlled hypertension but recently had improved with changes in her medications. Will add clonidine as she did not have much response to IV hydralazine in the ED.  -2/21: Pressures ran 130-140' systolic overnight. I think her hypertension was in large part due to dyspnea and anxiety. Will give her regular dose of cozaar, but give decreased does of metoprolol to avoid possible hypotension.         " Essential hypertension: As above       CKD (chronic kidney disease) stage 3, GFR 30-59 ml/min (H):Slightly higher creatinine today but BUN stable, recheck in Am hold further diuresis       Hyponatremia: She has long standing history of intermittent hypnoatremia. There was concern that her lasix may be contributing so that was stopped about 2 months ago. Most recent sodium levels in clinic 128 and 130. Today 130. Will wait to see how much lasix diureses her. Fluid restrict as her family feels she is a nervous drinker.   -2/21: Sodium up to 136 this morning due to saline given as above, continue fluid restriction, no further IVF    DVT Prophylaxis: Pneumatic Compression Devices  Code Status: No CPR- Do NOT Intubate    Disposition: Expected discharge in 1-2 days once respiratory status stable, ECHO complete.    Heidi Langford,  CNP    Interval History   Lauren looks and feels great today. She is awake,alert, sitting up in the chair. She denies any residual dyspnea, no pain. She is eager for discharge home.     -Data reviewed today: I reviewed all new labs and imaging results over the last 24 hours. I personally reviewed no images or EKG's today.    Physical Exam   Temp: 98.2  F (36.8  C) Temp src: Tympanic BP: 142/59 Pulse: 63   Resp: 16 SpO2: 95 % O2 Device: None (Room air) Oxygen Delivery: 2 LPM  Vitals:    02/20/21 1755 02/21/21 0600   Weight: 54.1 kg (119 lb 4.3 oz) 54.2 kg (119 lb 7.8 oz)     Vital Signs with Ranges  Temp:  [97  F (36.1  C)-98.2  F (36.8  C)] 98.2  F (36.8  C)  Pulse:  [60-68] 63  Resp:  [12-38] 16  BP: ()/() 142/59  SpO2:  [82 %-99 %] 95 %  I/O last 3 completed shifts:  In: 1069 [P.O.:180; I.V.:889]  Out: 510 [Urine:510]    Peripheral IV 02/20/21 Left Upper forearm (Active)   Site Assessment WDL 02/21/21 0035   Line Status Infusing 02/21/21 0035   Phlebitis Scale 0-->no symptoms 02/21/21 0035   Infiltration Scale 0 02/21/21 0035   Number of days: 1     Line/device assessment(s)  completed for medical necessity    Constitutional: Awake,alert, no acute distress.  Respiratory: Clear without crackles or wheezes but lift mid to base significantly diminished though unchanged from yesterday.  Cardiovascular: HRR, no murmurs, rubs,thrills. No peripheral edema.   GI: Soft,nontender,bowel sounds positive.   Skin/Integumen: No unusual bruising, rash or open areas.        Medications       donepezil  5 mg Oral At Bedtime     escitalopram  10 mg Oral Daily     losartan  100 mg Oral Daily     metoprolol succinate ER  100 mg Oral Daily     pantoprazole  40 mg Oral Daily       Data   Recent Labs   Lab 02/21/21  0506 02/20/21  1402 02/20/21  0955   WBC 4.5  --  6.0   HGB 10.8*  --  12.8   MCV 81  --  80     --  300     --  130*   POTASSIUM 3.8  --  4.9   CHLORIDE 106  --  102   CO2 25  --  23   BUN 31*  --  31*   CR 1.01  --  0.79   ANIONGAP 5  --  5   PEARL 8.0*  --  9.0   GLC 84  --  116*   PROTTOTAL  --  7.4  --    TROPI  --   --  0.034       Recent Results (from the past 24 hour(s))   CTA Chest with Contrast    Narrative    CTA CHEST WITH CONTRAST    HISTORY: 89 years Female shortness of breath, history of congestive  heart failure and recent history of pneumonia    TECHNIQUE: Axial CT imaging of the chest was performed with  intervenous contrast during arterial phase of enhancement. Multiplanar  reconstructions and 3-D MIP reconstructions were obtained on a 3-D  workstation.    COMPARISON: 9/25/2019    FINDINGS:  There is no evidence of pulmonary embolism.  There is a moderate to large right pleural effusion. There is a small  left pleural effusion.    There is linear atelectasis in the right lung. There is mild  atelectasis in the left lung. There is prominence of the pulmonary  interstitial septa suggesting edema.         No concerning osseous lesions are identified      Impression    IMPRESSION: No evidence of pulmonary embolism.    Moderate to large size right pleural effusion, small  left pleural  effusion and findings suggestive of pulmonary edema.    Airspace opacity and volume loss in the right lung. Suspect  atelectasis. Pneumonia or mass cannot be entirely excluded.    JARVIS CROWE MD   XR Chest Port 1 View    Narrative    XR CHEST PORT 1 VW    HISTORY: 89 yearsFemale S/p R thoracentesis    TECHNIQUE: A single view of the chest was performed    COMPARISON: 1/19/2021 an CT of 2/20/2021 earlier, before thoracentesis    FINDINGS: There is no evidence of right-sided pneumothorax. There is a  minimal residual volume of pleural fluid in the right hemithorax.  There is right medial basilar airspace opacity.    There is a small left pleural effusion with left basilar airspace  opacity.    Pulmonary vascularity is mildly to moderately prominent.        Impression    IMPRESSION: No evidence of pneumothorax after right thoracentesis.  Near complete resolution of right pleural fluid.    Persistent small left pleural effusion and bilateral basilar airspace  opacity.    Suspect mild to moderate pulmonary edema.    JARVIS CROWE MD

## 2021-02-21 NOTE — PLAN OF CARE
Reason for hospital stay:  Rt Pleural Effusion, Unstable BP    Most recent vitals: /53 (BP Location: Right arm)   Pulse 63   Temp 97.1  F (36.2  C) (Tympanic)   Resp 14   Ht 1.524 m (5')   Wt 54.1 kg (119 lb 4.3 oz)   SpO2 93%   BMI 23.29 kg/m      Pain Management:  Denied any pain this evening    LOC:  A+O x4 but lethargic this shift. Awakens easily to voice. Calls appropriately and makes needs known.     Cardiac:  Blood pressures low at start of shift - see flow sheets. Patient denied lightheadedness or dizziness but extremities were pale and cool. Updated Heidi Langford N.P. and came into room to assess patient. Bolus administered per orders. BP improved and was 104/53 later in evening. Tele reading SR 60s with prolonged QT per ICU nurse.    Respiratory:  Lungs clear - maintaining sats 90-93% on RA.     GI:  WDL    :  Incontinent of urine at times and also uses bedside commode.     Skin Issues:      IVF:  500mL NS bolus infused and now NS infusing at 50mL/hr.    Activity: Assist 2 to bedside commode - unsteady and wobbly with transfers.     Safety:  Alarms on      Face to face report given with opportunity to observe patient.    Report given to Racquel Cunningham RN   2/20/2021  11:39 PM

## 2021-02-21 NOTE — UTILIZATION REVIEW
Admission Status; Secondary Review Determination     Under the authority of the Utilization Management Commitee, the utilization review process indicated a secondary review on the above patient. The review outcome is based on review of the medical records, discussions with staff, and applying clinical experience noted on the date of the review.     (x) Inpatient Status Appropriate - This patient's medical care is consistent with medical management for inpatient care and reasonable inpatient medical practice.     RATIONALE FOR DETERMINATION:     89 year old female who presents with progressive shortness of breath over the course of the last 3 weeks.  She was recently diagnosed with PNA and completed a course of Levaquin.  Her work up is notable for pleural effusion and pulmonary edema on chest imaging.  BNP was 18,000.  COVID 19 testing is positive.  Culture results from thoracentesis is pending.  The patient has received IV diuresis and underwent thoracentesis.  Symptoms have improved.  She is being monitored in the hospital for at least another evening with plans for TTE and repeat CXR tomorrow.      At the time of admission with the information available to the attending physician more than 2 nights Hospital complex care was anticipated, based on patient risk of adverse outcome if treated as outpatient and complex care required. Inpatient admission is appropriate based on the Medicare guidelines.    The information on this document is developed by the utilization review team in order for the business office to ensure compliance. This only denotes the appropriateness of proper admission status and does not reflect the quality of care rendered.   The definitions of Inpatient Status and Observation Status used in making the determination above are those provided in the CMS Coverage Manual, Chapter 1 and Chapter 6, section 70.4.     Sincerely,     Truman Clark MD  Utilization Review   Physician Advisor   [facility]

## 2021-02-21 NOTE — PHARMACY-MEDICATION REGIMEN REVIEW
Pharmacy Antimicrobial Stewardship Assessment     Indication: COVID positive [2/20/21]     Pertinent Labs:  WBC   Date Value Ref Range Status   02/21/2021 4.5 4.0 - 11.0 10e9/L Final   02/20/2021 6.0 4.0 - 11.0 10e9/L Final   Final     Procalcitonin   Date Value Ref Range Status   02/20/2021 0.05 ng/ml Final     Comment:     0.05-0.24 ng/ml Low risk of systemic bacterial infection. Local bacterial   infection possible.  Recommendation: Assess other clinical features of   infection. Discourage antibiotics unless strong clinical suspicion for serious   infection.       CRP Inflammation   Date Value Ref Range Status   02/20/2021 6.3 0.0 - 8.0 mg/L Final         Culture Results:   (2/20/21) COVID = positive  (2/20/21) Influenza/RSV = negative  (2/21/21) Pleural fluid    1/19/21: Levaquin 500 mg daily x10 days     Recommendations/Interventions:  1. Patient's COVID positive result from 2/20/21 not discussed in H&P; diagnosis of pleural effusion. Per chart review, patient did possibly have COVID in November, so positive result may not be new diagnosis    2. Start Decadron if warranted for COVID treatment    3. Patient would be eligible for remdesevir (GFR =47) if treatment warranted    4. Add Heparin 5000 units TID for VTE prophylaxis per COVID Anticoagulation & recheck D-dimer    Trae Nieves, [facility]  February 21, 2021

## 2021-02-21 NOTE — PLAN OF CARE
Pt A&Ox3. She denies pain. Afebrile. HR 61. /60 and 142/59. RR 16 and sating greater than 92% on room air. Lung sounds are diminished on the LLL otherwise clear.  IVF continued throughout night. Pt transferred with assist of two to commode and chair. Some blanchable redness to butt. Alarms on, call light within reach and pt calls appropriately.     Face to face report given with opportunity to observe patient.    Report given to HEAVEN Mathews RN   2/21/2021  7:37 AM

## 2021-02-21 NOTE — PLAN OF CARE
Alert and oriented x3, confused to situation.  Remains on room air.  Denies dyspnea.  Left lung base diminished.  Up in chair most of shift, refuses to rest in bed when asked.  Up to commode with SBA.  Continent fof urine with occasional dribbling.  No s/s of hypotension.  Maintaining fluid restriction of 2 liters.  Daughter Yesica reminded not to fill water glass without notifying staff.  Sacrum red and blanchable, pressure relief cushion obtained.  Bandaid intact to rt side of back from thoracentesis, no additional drainage.      Face to face report given with opportunity to observe patient.    Report given to Keith Arceo RN   2/21/2021  3:27 PM

## 2021-02-22 NOTE — DISCHARGE SUMMARY
"[facility]    Discharge Summary  Hospitalist    Date of Admission:  2/20/2021  Date of Discharge:  2/22/2021 12:04 PM  Discharging Provider: Heidi Langford CNP  Date of Service (when I saw the patient): 02/22/21    Discharge Diagnoses   Principal Problem:    Pleural effusion  Active Problems:    Acute on chronic diastolic congestive heart failure (H)    Hypertensive urgency    Essential hypertension    Pulmonary fibrosis (H)    CKD (chronic kidney disease) stage 3, GFR 30-59 ml/min (H)    Hyponatremia    History of Present Illness      From admission: Lauren Barron is a 89 year old female who presents with progressive shortness of breath over the course of the last 3 weeks. She was treated with a course of Levaquin for 10 days after presenting to the ED with the same complaints on 2/19 with CXR showing possible bibasilar pneumonia. At that time she did not have any fever or leukocytosis but did have a cough. She reports initially she did feel some improvement but then her breathing worsened again. She is currently having an intermittent dry cough over the last 3 weeks. She denies any recent abdominal pain or vomiting. No fevers at home. No changes in urination or stools. She has also been taking her blood pressures at home and recently has noted a trend of worsening.     Hospital Course   Pleural effusion: She has small bilateral pleural effusions on clinic cxr 2/16 but right was listed as mils and left \"tiny\", on chest CT today she has moderate right and small left. She recently had pneumonia RLL that she was treated for 10 days on Levaquin. There continues to be residual right basal opacity. Thoracentesis was performed in ED removing 1400ml straw fluid-this is sent for analysis, She reports immediate improvement in dyspnea. She is not hypoxic or hypercapnic. Etiology is possible parapneumonic effusion versus CHF exacerbation or combination of the two. Gram stain and culture along with " cytology ordered as well.    -2/21: Denies any dyspnea, up in chair. Fluid analysis shows it is transudate. ECHO and repeat CXR tomorrow.   -2/22: ECHO shows systolic CHF with EF 40-45% which is new for her. Her right lung that had larger effusion is stable, left small effusion has improved a bit as well. She denies any dyspnea. She will be discharged on low dose lasix of 20mg daily and follow-up with her PCP.        Acute on chronic diastolic and systolic congestive heart failure (H): Possible acute component with rapid development of bilateral pleural effusions and suggestion of pulmonary edema on imaging. She received 20mg IV lasix in the ED but has only had minimal urine out thus far.  -2/21: Required 500cc bolus for poor urine output and hypotension that resolved after fluid given. Will not give any further fluid today but also will not give any diuresis and see what her output and respiratory status does today.   -2/21:  Her respiratory status is much improved from admission, she feels her breathing is better than it has been for a long time. She will continue with oral Lasix with recheck of BMP and symptoms with her PCP in 5-7 days.        Hypertensive urgency: She had not taken any of her morning medications prior to coming in to the ED and pressures were as high as 210/110. She has a history of poorly controlled hypertension but recently had improved with changes in her medications. Will add clonidine as she did not have much response to IV hydralazine in the ED.  -2/21: Pressures ran 130-140' systolic overnight. I think her hypertension was in large part due to dyspnea and anxiety. Will give her regular dose of cozaar, but give decreased does of metoprolol to avoid possible hypotension.    -2/22: Pressures running a bit high again this morning so she was given her regular home doses of cozaar and metoprolol and pressures came down nicely. No changed made on discharge.        Essential hypertension: As  above       CKD (chronic kidney disease) stage 3, GFR 30-59 ml/min (H):Slightly higher creatinine today but BUN stable, recheck in Am hold further diuresis  -2/22: Back to her baseline this morning, she did not tolerate aggressive IV diuresis though so will try low and slow with oral as her respiratory status I stable.       Hyponatremia: She has long standing history of intermittent hypnoatremia. There was concern that her lasix may be contributing so that was stopped about 2 months ago. Most recent sodium levels in clinic 128 and 130. Today 130. Will wait to see how much lasix diureses her. Fluid restrict as her family feels she is a nervous drinker.   -2/21: Sodium up to 136 this morning due to saline given as above, continue fluid restriction, no further IVF      Heidi Langford CNP      Pending Results     Unresulted Labs Ordered in the Past 30 Days of this Admission     Date and Time Order Name Status Description    2/20/2021 1344 Fluid Culture Aerobic Bacterial Preliminary     2/20/2021 1301 Cytology Non Gyn In process           Code Status   DNR / DNI       Primary Care Physician   Vandana Jones    Discharge Disposition   Discharged to home  Condition at discharge: Stable    Consultations This Hospital Stay   None    Time Spent on this Encounter   I, Heidi Langford NP, personally saw the patient today and spent greater than 30 minutes discharging this patient.    Discharge Orders      Reason for your hospital stay    Shortness of breath-right pleural effusion (fluid collection on lung)     Follow-up and recommended labs and tests     Follow up with primary care provider, Dr. Selena Christensen, within 7 days for hospital follow- up.  The following labs/tests are recommended: BMP.     Activity    Your activity upon discharge: activity as tolerated     When to contact your care team    Call your primary doctor if you have any of the following: fever, increasing shortness of breath, chest pain.     No  CPR- Do NOT Intubate     Diet    Follow this diet upon discharge: Orders Placed This Encounter      Fluid restriction 2000 ML FLUID      Combination Diet Regular Diet Adult; 2 gm NA Diet     Discharge Medications   Current Discharge Medication List      START taking these medications    Details   furosemide (LASIX) 20 MG tablet Take 1 tablet (20 mg) by mouth daily  Qty: 30 tablet, Refills: 0    Associated Diagnoses: Diastolic dysfunction         CONTINUE these medications which have NOT CHANGED    Details   acetaminophen 500 MG PO tablet Take 1,000 mg by mouth every 6 hours as needed       Cranberry 400 MG CAPS Take 400 mg by mouth 2 times daily      DONEPEZIL HCL PO Take 5 mg by mouth At Bedtime       escitalopram (LEXAPRO) 10 MG tablet Take 10 mg by mouth daily      isosorbide mononitrate (IMDUR) 30 MG 24 hr tablet TAKE ONE TABLET BY MOUTH AT BEDTIME.  Qty: 90 tablet, Refills: 1    Associated Diagnoses: Chronic ischemic heart disease      losartan (COZAAR) 100 MG tablet Take 100 mg by mouth daily      !! metoprolol succinate ER (TOPROL-XL) 100 MG 24 hr tablet Take 100 mg by mouth daily      !! metoprolol succinate ER (TOPROL-XL) 50 MG 24 hr tablet Take 50 mg by mouth daily      pantoprazole (PROTONIX) 40 MG EC tablet TAKE 1 TABLET BY MOUTH ONCE DAILY.  Qty: 90 tablet, Refills: 1    Associated Diagnoses: Duodenal ulcer      Multiple Vitamins-Minerals (MULTIVITAMIN ADULT PO) Take 1 tablet by mouth daily (Patient uses [facility] Multivitamin, Dr. Lucy lakhani)      nitroGLYcerin (NITROSTAT) 0.4 MG sublingual tablet Place 1 tablet (0.4mg) by sublingual route at the first sign of attack; may repeat ever 5 min until relief; if pain persists after 3 tablets in 15 minutes prompt medical attention is recommended. AS NEEDED  Qty: 25 tablet, Refills: 3    Associated Diagnoses: Chronic ischemic heart disease, unspecified       !! - Potential duplicate medications found. Please discuss with provider.        Allergies    Allergies   Allergen Reactions     [place] Juice Other (See Comments), Nausea and GI Disturbance     Vertigo     Bactrim [Sulfamethoxazole W/Trimethoprim]      Hyponatremia     Food      Oranges.     Naprosyn [Naproxen]      Incontinence       Niacin Swelling     Data   Most Recent 3 CBC's:  Recent Labs   Lab Test 02/22/21  0515 02/21/21  0506 02/20/21  0955   WBC 6.0 4.5 6.0   HGB 11.9 10.8* 12.8   MCV 79 81 80    208 300      Most Recent 3 BMP's:  Recent Labs   Lab Test 02/22/21  0515 02/21/21  0506 02/20/21  0955    136 130*   POTASSIUM 4.1 3.8 4.9   CHLORIDE 104 106 102   CO2 25 25 23   BUN 24 31* 31*   CR 0.86 1.01 0.79   ANIONGAP 5 5 5   PEARL 8.8 8.0* 9.0   GLC 81 84 116*     Most Recent 2 LFT's:  Recent Labs   Lab Test 01/19/21  1542 12/07/20  1210   AST 58* 33   * 73*   ALKPHOS 132 103   BILITOTAL 0.6 0.3     Most Recent INR's and Anticoagulation Dosing History:  Anticoagulation Dose History     Recent Dosing and Labs Latest Ref Rng & Units 10/23/2009 10/24/2009 10/25/2009 10/26/2009    INR 0.86 - 1.14 1.09 1.15(H) 1.37(H) 1.36(H)        Most Recent 3 Troponin's:  Recent Labs   Lab Test 02/20/21  0955 01/19/21  1542 03/15/20  0059   TROPI 0.034 0.060* 0.089*     Most Recent Cholesterol Panel:  Recent Labs   Lab Test 12/05/16  1000   CHOL 227*   *   HDL 54   TRIG 271*     Most Recent 6 Bacteria Isolates From Any Culture (See EPIC Reports for Culture Details):  Recent Labs   Lab Test 02/20/21  1302 12/07/20  1215 11/05/20  1130 02/19/20  1500 10/14/19  1720 10/14/19  1418   CULT No growth after 3 days >100,000 colonies/mL  Escherichia coli  * >100,000 colonies/mL  Escherichia coli  * No MRSA isolated No MRSA isolated <10,000 colonies/mL  mixed urogenital neetu  No further identification or sensitivity done       Most Recent TSH, T4 and A1c Labs:  Recent Labs   Lab Test 10/29/18  1140   TSH 1.84     Results for orders placed or performed during the hospital encounter of 02/20/21   CTA  Chest with Contrast    Narrative    CTA CHEST WITH CONTRAST    HISTORY: 89 years Female shortness of breath, history of congestive  heart failure and recent history of pneumonia    TECHNIQUE: Axial CT imaging of the chest was performed with  intervenous contrast during arterial phase of enhancement. Multiplanar  reconstructions and 3-D MIP reconstructions were obtained on a 3-D  workstation.    COMPARISON: 9/25/2019    FINDINGS:  There is no evidence of pulmonary embolism.  There is a moderate to large right pleural effusion. There is a small  left pleural effusion.    There is linear atelectasis in the right lung. There is mild  atelectasis in the left lung. There is prominence of the pulmonary  interstitial septa suggesting edema.         No concerning osseous lesions are identified      Impression    IMPRESSION: No evidence of pulmonary embolism.    Moderate to large size right pleural effusion, small left pleural  effusion and findings suggestive of pulmonary edema.    Airspace opacity and volume loss in the right lung. Suspect  atelectasis. Pneumonia or mass cannot be entirely excluded.    JARVIS CROWE MD   XR Chest Port 1 View    Narrative    XR CHEST PORT 1 VW    HISTORY: 89 yearsFemale S/p R thoracentesis    TECHNIQUE: A single view of the chest was performed    COMPARISON: 1/19/2021 an CT of 2/20/2021 earlier, before thoracentesis    FINDINGS: There is no evidence of right-sided pneumothorax. There is a  minimal residual volume of pleural fluid in the right hemithorax.  There is right medial basilar airspace opacity.    There is a small left pleural effusion with left basilar airspace  opacity.    Pulmonary vascularity is mildly to moderately prominent.        Impression    IMPRESSION: No evidence of pneumothorax after right thoracentesis.  Near complete resolution of right pleural fluid.    Persistent small left pleural effusion and bilateral basilar airspace  opacity.    Suspect mild to moderate  pulmonary edema.    JARVIS CROWE MD   XR Chest 2 Views    Narrative    PROCEDURE:  XR CHEST 2 VW    HISTORY:  recheck pleural effusions.     COMPARISON:  February 20, 2021    FINDINGS:   Heart is enlarged widespread interstitial thickening is noted without  change there is some mild blunting of the right costophrenic angle.  There is a small left-sided pleural effusion. There is some  compressive atelectasis seen at the lung bases.      Impression    IMPRESSION:  Small bilateral pleural effusions larger on the left than  the right.    Cardiomegaly with widespread interstitial thickening stable from  previous examination      GALLITO HENRY MD

## 2021-02-22 NOTE — PLAN OF CARE
Patient discharged at 11:53 AM via wheel chair accompanied by daughter and staff. Prescriptions sent to patients preferred pharmacy. All belongings sent with patient.     Discharge instructions reviewed with Patient and daughter. Listed belongings gathered and returned to patient. Yes    Patient discharged to Home.       Core Measures and Vaccines  Core Measures applicable during stay: No. If yes, state diagnosis:  CHF  Pneumonia and Influenza given prior to discharge, if indicated: No    Surgical Patient   Surgical Procedures during stay: No      MISC  Follow up appointment made:  Yes  Home and hospital aquired medications returned to patient: No  Patient reports pain was well managed at discharge: Yes

## 2021-02-22 NOTE — PLAN OF CARE
Reason for hospital stay:  Rt. Pleural Effusion, Hyponatremia    Most recent vitals: /76   Pulse 76   Temp 97.3  F (36.3  C) (Tympanic)   Resp 16   Ht 1.524 m (5')   Wt 54.2 kg (119 lb 7.8 oz)   SpO2 97%   BMI 23.34 kg/m      Pain Management:  Denied any pain this shift.    LOC:  Alert but disoriented to situation at start of shift but grew more confused as evening progressed. Became upset and didn't know where she was and wanted to go home. Easily redirected.     Cardiac:  WDL - Tele reading SR 70s per ICU nurse.     Respiratory:  WDL - Maintaining sats 97% on RA.     GI:  Continues on 2L fluid restriction.     :  Voiding adequate urine in commode.     Skin Issues:  Sacrum blanchable red - frequently repositions self in chair and is fidgety. Air cushion in place in recliner. Right thoracentesis site covered with band-aid - remains CDI.     IVF:  SL    ABX:  None    Ambulation: Standby assist to bedside commode and ambulated in room with standby assist.     Safety:  Alarms on      Face to face report given with opportunity to observe patient.    Report given to Racquel Cunningham RN   2/21/2021  11:19 PM

## 2021-02-22 NOTE — PLAN OF CARE
Last vitals: /68   Pulse 71   Temp 97.1  F (36.2  C) (Tympanic)   Resp 18   Ht 1.524 m (5')   Wt 54.2 kg (119 lb 7.8 oz)   SpO2 93%   BMI 23.34 kg/m        Patient is A&O x 4, pleasant. Able to make needs known. VSS, afebrile. Denies any pain. HRR. Denies SOB, no RENEE. Maintaining o2 saturation at 93% on RA. LS clear, diminished in LLL. BS active x 4. Rounded in appearance. Had BM today. Up frequently to use restroom. Educated on 2000 ml fluid restriction, lasix, and diet prior to discharge.   Call light in reach. ID band in place. Stand-by assist to restroom.

## 2021-02-22 NOTE — PLAN OF CARE
Pt alert but disoriented to place, time and situation. She denies any pain. Up frequently to the commode, voiding small amounts. Afebrile. HR 60s. /72. RR 16 and sating greater than 92% on room air. Bottom has some blanchable redness, pt repositions self frequently in bed. Xray done this AM. Alarms on, call light within reach. BP elevated this AM, BP meds given early.     Face to face report given with opportunity to observe patient.    Report given to HEAVEN Dickinson RN   2/22/2021  7:33 AM

## 2021-02-22 NOTE — PROGRESS NOTES
Assessment completed with Lauren and daughter Yesica     LOC: alert, oriented to self and situation     Dx: Pleural effusion   Chronic Disease Management: HTN, Hyperlipidemia, Meniere's disease     Lives with: Lives at the [facility] independently, her kids have been taking turns staying with her so she has 24/7 coverage since 10/2020  Living at:  [facility]   Transportation: YES Her kids take turns providing transportation     Primary PCP: Vandana Jones  Insurance:  [facility] Medicare   Medicare  letter reviewed with Lauren and Yesica      Support System:  Excellent, all of her kids are very involved with her care.  Homecare/PCA: No  /County Services:   No  [place]: NO     Health Care Directive: Yes on file  Guardian: No   POA: No    [facility]acy: [facility]   Meds management: Kids help with medications     Adequate Resources for needs (housing, utilities, food/med): YES  Household chores: Kids complete household duties   Work/community/social activity: NO     ADLs: MOD IND  Ambulation: FWW  Falls: Denies    Mental health: Denies  Substance abuse: Denies  Exposure to violence/abuse: Denies    Able to Return to Prior Living Arrangements: YES      JOSEFA: Low    Plan: Discharge back to home at the [facility] with her kids. Patient financial contacted to start a LTC application and patient referred to clinic care coordination to assist family with transition to SNF at some point.

## 2021-02-22 NOTE — DISCHARGE INSTRUCTIONS
Appointments: You have a Hospital Follow-up appointment scheduled for Tuesday March 2nd at 12:45pm with Dr. Bonny Mcnair at [facility] if you need to reschedule please call [phone]

## 2021-02-24 NOTE — PROGRESS NOTES
Care Transitions focused note:      Spoke to patient's daughter.  Sister is currently staying at patient's house and they are changing off.  Has Home care with Skilled RN 1x/week and PT 1x/week.  They are feeling she needs a higher level of care than independent living.  Discussed different options.  Encouraged them to fill out a LT application form to get the process started.  Daughter volunteered that patient is now being seen at [facility] by Dr. Arias.  Since this is the case, this SW got into the mail the application and telephone number for [facility] LT consultation/[place] Choices Assessment.     The rest this SW is referring over to [facility] as they are seeing the patient now - Bettina Patricio, care coordinator

## 2021-02-25 NOTE — TELEPHONE ENCOUNTER
.Attempted to make follow up phone call with patient. Unable to reach patient at this time. First attempt. Will attempt again.

## 2021-03-03 NOTE — TELEPHONE ENCOUNTER
isosorbide      Last Written Prescription Date:  8/18/20  Last Fill Quantity: 90,   # refills: 1  Last Office Visit: 1/25/21  Future Office visit:       Routing refill request to provider for review/approval because:

## 2021-04-30 NOTE — TELEPHONE ENCOUNTER
NITROSTAT      Last Written Prescription Date:  5-  Last Fill Quantity: 25,   # refills: 3  Last Office Visit: 1-  Future Office visit:       Routing refill request to provider for review/approval because:

## 2021-08-20 NOTE — TELEPHONE ENCOUNTER
Nicol from  Hospice called, call back number [phone]    Referral from [facility] for hospice, requesting second provider to admit to hospice. Only requesting VO ok to admit to hospice.     MD needs to give order ASAP. Covering provider to advise.

## 2022-05-01 NOTE — ED NOTES
Pt in CT. Dr. Okeefe discussing plan of care.   normal... Intoxicated,, awake, alert, oriented to person, place, time/situation and in no apparent distress.

## 2022-09-18 NOTE — TELEPHONE ENCOUNTER
lorazepam      Last Written Prescription Date:  9/24/20  Last Fill Quantity: 30,   # refills: 0  Last Office Visit: 7/21/20  Future Office visit:       Routing refill request to provider for review/approval because:  Drug not on the G, P or [facility] refill protocol or controlled substance    
(1) Other Diagnosis

## 2022-12-24 NOTE — ED TRIAGE NOTES
Dark stool x2 last night.  Pt reports some upper abdominal pain but not sure if its new pains or from her known hernia.     Pt advised and dismissed in no acute distress. Pt verbalized understanding of d/c instructions and follow up.

## 2023-02-05 NOTE — PROGRESS NOTES
PEDRO JEREZ  Patient visit during  rounds. No spiritual care requests at this time.     Unable to answer due to medical condition/unresponsive/etc... Action 2: Continue Detail Level: Zone

## 2023-05-08 NOTE — LETTER
October 30, 2018      Lauren Barron  [address]  [place] [address]        Dear ,    We are writing to inform you of your test results.    Labs show that sodium is low at 127. I checked other labs on blood and urine already given today to further evaluate this including thyroid testing. All else is normal. This is due to the body resetting the normal for sodium. Would recommend rechecking CT scan of the chest as sometimes a lung nodule can cause this. CT scan was last done 6/2017 and 6 month follow up was recommended.   Urine shows a possible bladder infection, culture was set up, will call with results and treat if this shows an infection.   Kidney function has improved, liver tests are normal blood sugar is normal, blood counts are normal    Resulted Orders   CBC with platelets and differential   Result Value Ref Range    WBC 6.9 4.0 - 11.0 10e9/L    RBC Count 3.97 3.8 - 5.2 10e12/L    Hemoglobin 11.9 11.7 - 15.7 g/dL    Hematocrit 34.0 (L) 35.0 - 47.0 %    MCV 86 78 - 100 fl    MCH 30.0 26.5 - 33.0 pg    MCHC 35.0 31.5 - 36.5 g/dL    RDW 13.2 10.0 - 15.0 %    Platelet Count 286 150 - 450 10e9/L    Diff Method Automated Method     % Neutrophils 62.5 %    % Lymphocytes 24.7 %    % Monocytes 10.6 %    % Eosinophils 1.5 %    % Basophils 0.6 %    % Immature Granulocytes 0.1 %    Nucleated RBCs 0 0 /100    Absolute Neutrophil 4.3 1.6 - 8.3 10e9/L    Absolute Lymphocytes 1.7 0.8 - 5.3 10e9/L    Absolute Monocytes 0.7 0.0 - 1.3 10e9/L    Absolute Eosinophils 0.1 0.0 - 0.7 10e9/L    Absolute Basophils 0.0 0.0 - 0.2 10e9/L    Abs Immature Granulocytes 0.0 0 - 0.4 10e9/L    Absolute Nucleated RBC 0.0    Comprehensive metabolic panel   Result Value Ref Range    Sodium 127 (L) 133 - 144 mmol/L    Potassium 4.3 3.4 - 5.3 mmol/L    Chloride 94 94 - 109 mmol/L    Carbon Dioxide 28 20 - 32 mmol/L    Anion Gap 5 3 - 14 mmol/L    Glucose 95 70 - 99 mg/dL      Comment:      Fasting specimen    Urea Nitrogen  34 (H) 7 - 30 mg/dL    Creatinine 1.08 (H) 0.52 - 1.04 mg/dL    GFR Estimate 47 (L) >60 mL/min/1.7m2      Comment:      Non  GFR Calc    GFR Estimate If Black 57 (L) >60 mL/min/1.7m2      Comment:       GFR Calc    Calcium 8.9 8.5 - 10.1 mg/dL    Bilirubin Total 0.4 0.2 - 1.3 mg/dL    Albumin 3.7 3.4 - 5.0 g/dL    Protein Total 7.2 6.8 - 8.8 g/dL    Alkaline Phosphatase 54 40 - 150 U/L    ALT 35 0 - 50 U/L    AST 26 0 - 45 U/L   UA reflex to Microscopic and Culture   Result Value Ref Range    Color Urine Yellow     Appearance Urine Clear     Glucose Urine Negative NEG^Negative mg/dL    Bilirubin Urine Negative NEG^Negative    Ketones Urine Negative NEG^Negative mg/dL    Specific Gravity Urine 1.012 1.003 - 1.035    Blood Urine Negative NEG^Negative    pH Urine 5.0 4.7 - 8.0 pH    Protein Albumin Urine Negative NEG^Negative mg/dL    Urobilinogen mg/dL Normal 0.0 - 2.0 mg/dL    Nitrite Urine Negative NEG^Negative    Leukocyte Esterase Urine Large (A) NEG^Negative    Source Midstream Urine     RBC Urine 1 0 - 2 /HPF    WBC Urine 40 (H) 0 - 5 /HPF    Bacteria Urine Many (A) NEG^Negative /HPF    Squamous Epithelial /HPF Urine <1 0 - 1 /HPF    Mucous Urine Present (A) NEG^Negative /LPF   Osmolarity ( Serum)   Result Value Ref Range    Osmolality 277 (L) 280 - 295 mmol/kg   Osmolality urine   Result Value Ref Range    Urine Osmolality 457 100 - 1200 mmol/kg   Sodium random urine   Result Value Ref Range    Sodium Urine mmol/L 31 mmol/L   TSH with free T4 reflex   Result Value Ref Range    TSH 1.84 0.40 - 4.00 mU/L       If you have any questions or concerns, please call the clinic at the number listed above.       Sincerely,        Vandana Jones MD                 [Time Spent: ___ minutes] : I have spent [unfilled] minutes of time on the encounter. [>50% of the face to face encounter time was spent on counseling and/or coordination of care for ___] : Greater than 50% of the face to face encounter time was spent on counseling and/or coordination of care for [unfilled]

## (undated) DEVICE — LIGHT HANDLE COVER

## (undated) DEVICE — SYRINGE-30CC SLIP TIP

## (undated) DEVICE — FORCEP-COLON BIOPSY STD W/NEEDLE 160CM

## (undated) DEVICE — TUBING-SUCTION 20FT

## (undated) DEVICE — CONNECTOR-ERBEFLO 2 PORT

## (undated) DEVICE — IRRIGATION-H2O 1000ML

## (undated) DEVICE — CANISTER-SUCTION 2000CC

## (undated) DEVICE — LUBRICANT JELLY 2OZ. TUBE

## (undated) DEVICE — MOUTHPIECE W/GUARD FOR ENDOSCOPY

## (undated) RX ORDER — LIDOCAINE HYDROCHLORIDE 20 MG/ML
INJECTION, SOLUTION EPIDURAL; INFILTRATION; INTRACAUDAL; PERINEURAL
Status: DISPENSED
Start: 2019-09-26

## (undated) RX ORDER — PROPOFOL 10 MG/ML
INJECTION, EMULSION INTRAVENOUS
Status: DISPENSED
Start: 2019-09-26